# Patient Record
Sex: FEMALE | Race: WHITE | NOT HISPANIC OR LATINO | Employment: OTHER | URBAN - METROPOLITAN AREA
[De-identification: names, ages, dates, MRNs, and addresses within clinical notes are randomized per-mention and may not be internally consistent; named-entity substitution may affect disease eponyms.]

---

## 2018-03-13 ENCOUNTER — OFFICE VISIT CONVERTED (OUTPATIENT)
Dept: FAMILY MEDICINE CLINIC | Facility: CLINIC | Age: 63
End: 2018-03-13
Attending: FAMILY MEDICINE

## 2018-03-19 ENCOUNTER — OFFICE VISIT CONVERTED (OUTPATIENT)
Dept: SURGERY | Facility: CLINIC | Age: 63
End: 2018-03-19
Attending: SURGERY

## 2018-03-27 ENCOUNTER — OFFICE VISIT CONVERTED (OUTPATIENT)
Dept: FAMILY MEDICINE CLINIC | Facility: CLINIC | Age: 63
End: 2018-03-27
Attending: FAMILY MEDICINE

## 2018-09-13 ENCOUNTER — OFFICE VISIT CONVERTED (OUTPATIENT)
Dept: FAMILY MEDICINE CLINIC | Facility: CLINIC | Age: 63
End: 2018-09-13
Attending: FAMILY MEDICINE

## 2018-09-27 ENCOUNTER — OFFICE VISIT CONVERTED (OUTPATIENT)
Dept: FAMILY MEDICINE CLINIC | Facility: CLINIC | Age: 63
End: 2018-09-27
Attending: FAMILY MEDICINE

## 2019-01-31 ENCOUNTER — OFFICE VISIT CONVERTED (OUTPATIENT)
Dept: FAMILY MEDICINE CLINIC | Facility: CLINIC | Age: 64
End: 2019-01-31
Attending: FAMILY MEDICINE

## 2019-01-31 ENCOUNTER — HOSPITAL ENCOUNTER (OUTPATIENT)
Dept: FAMILY MEDICINE CLINIC | Facility: CLINIC | Age: 64
Discharge: HOME | End: 2019-01-31
Attending: FAMILY MEDICINE

## 2019-01-31 LAB
ALBUMIN SERPL-MCNC: 4.5 G/DL (ref 3.5–5)
ALBUMIN/GLOB SERPL: 1.5 {RATIO} (ref 1.4–2.6)
ALP SERPL-CCNC: 91 U/L (ref 43–160)
ALT SERPL-CCNC: 26 U/L (ref 10–40)
ANION GAP SERPL CALC-SCNC: 17 MMOL/L (ref 8–19)
AST SERPL-CCNC: 27 U/L (ref 15–50)
BASOPHILS # BLD AUTO: 0.09 10*3/UL (ref 0–0.2)
BASOPHILS NFR BLD AUTO: 1.02 % (ref 0–3)
BILIRUB SERPL-MCNC: 0.22 MG/DL (ref 0.2–1.3)
BUN SERPL-MCNC: 16 MG/DL (ref 5–25)
BUN/CREAT SERPL: 17 {RATIO} (ref 6–20)
CALCIUM SERPL-MCNC: 9.3 MG/DL (ref 8.7–10.4)
CHLORIDE SERPL-SCNC: 103 MMOL/L (ref 99–111)
CHOLEST SERPL-MCNC: 221 MG/DL (ref 107–200)
CHOLEST/HDLC SERPL: 4.9 {RATIO} (ref 3–6)
CONV CO2: 25 MMOL/L (ref 22–32)
CONV TOTAL PROTEIN: 7.6 G/DL (ref 6.3–8.2)
CREAT UR-MCNC: 0.94 MG/DL (ref 0.5–0.9)
EOSINOPHIL # BLD AUTO: 0.34 10*3/UL (ref 0–0.7)
EOSINOPHIL # BLD AUTO: 3.97 % (ref 0–7)
ERYTHROCYTE [DISTWIDTH] IN BLOOD BY AUTOMATED COUNT: 13.5 % (ref 11.5–14.5)
GFR SERPLBLD BASED ON 1.73 SQ M-ARVRAT: >60 ML/MIN/{1.73_M2}
GLOBULIN UR ELPH-MCNC: 3.1 G/DL (ref 2–3.5)
GLUCOSE SERPL-MCNC: 67 MG/DL (ref 65–99)
HBA1C MFR BLD: 15.5 G/DL (ref 12–16)
HCT VFR BLD AUTO: 45.5 % (ref 37–47)
HDLC SERPL-MCNC: 45 MG/DL (ref 40–60)
LDLC SERPL CALC-MCNC: 116 MG/DL (ref 70–100)
LYMPHOCYTES # BLD AUTO: 2.84 10*3/UL (ref 1–5)
MCH RBC QN AUTO: 30.8 PG (ref 27–31)
MCHC RBC AUTO-ENTMCNC: 34.1 G/DL (ref 33–37)
MCV RBC AUTO: 90.3 FL (ref 81–99)
MONOCYTES # BLD AUTO: 0.73 10*3/UL (ref 0.2–1.2)
MONOCYTES NFR BLD AUTO: 8.48 % (ref 3–10)
NEUTROPHILS # BLD AUTO: 4.62 10*3/UL (ref 2–8)
NEUTROPHILS NFR BLD AUTO: 53.6 % (ref 30–85)
NRBC BLD AUTO-RTO: 0 % (ref 0–0.01)
OSMOLALITY SERPL CALC.SUM OF ELEC: 289 MOSM/KG (ref 273–304)
PLATELET # BLD AUTO: 179 10*3/UL (ref 130–400)
PMV BLD AUTO: 11.2 FL (ref 7.4–10.4)
POTASSIUM SERPL-SCNC: 4.8 MMOL/L (ref 3.5–5.3)
RBC # BLD AUTO: 5.04 10*6/UL (ref 4.2–5.4)
SODIUM SERPL-SCNC: 140 MMOL/L (ref 135–147)
T4 FREE SERPL-MCNC: 1.4 NG/DL (ref 0.9–1.8)
TRIGL SERPL-MCNC: 300 MG/DL (ref 40–150)
TSH SERPL-ACNC: 1.68 M[IU]/L (ref 0.27–4.2)
VARIANT LYMPHS NFR BLD MANUAL: 32.9 % (ref 20–45)
VLDLC SERPL-MCNC: 60 MG/DL (ref 5–37)
WBC # BLD AUTO: 8.62 10*3/UL (ref 4.8–10.8)

## 2019-02-06 ENCOUNTER — HOSPITAL ENCOUNTER (OUTPATIENT)
Dept: OTHER | Facility: HOSPITAL | Age: 64
Discharge: HOME | End: 2019-02-06
Attending: FAMILY MEDICINE

## 2019-08-20 ENCOUNTER — HOSPITAL ENCOUNTER (OUTPATIENT)
Dept: OTHER | Facility: HOSPITAL | Age: 64
Discharge: HOME | End: 2019-08-20
Attending: FAMILY MEDICINE

## 2019-08-20 LAB
CREAT BLD-MCNC: 0.9 MG/DL (ref 0.6–1.4)
GFR SERPLBLD BASED ON 1.73 SQ M-ARVRAT: >60 ML/MIN/{1.73_M2}

## 2019-10-15 ENCOUNTER — OFFICE VISIT CONVERTED (OUTPATIENT)
Dept: FAMILY MEDICINE CLINIC | Facility: CLINIC | Age: 64
End: 2019-10-15
Attending: FAMILY MEDICINE

## 2019-10-15 ENCOUNTER — HOSPITAL ENCOUNTER (OUTPATIENT)
Dept: FAMILY MEDICINE CLINIC | Facility: CLINIC | Age: 64
Discharge: HOME | End: 2019-10-15
Attending: FAMILY MEDICINE

## 2019-10-15 LAB
ALBUMIN SERPL-MCNC: 4.6 G/DL (ref 3.5–5)
ALBUMIN/GLOB SERPL: 1.4 {RATIO} (ref 1.4–2.6)
ALP SERPL-CCNC: 88 U/L (ref 43–160)
ALT SERPL-CCNC: 29 U/L (ref 10–40)
ANION GAP SERPL CALC-SCNC: 17 MMOL/L (ref 8–19)
AST SERPL-CCNC: 31 U/L (ref 15–50)
BASOPHILS # BLD AUTO: 0.09 10*3/UL (ref 0–0.2)
BASOPHILS NFR BLD AUTO: 0.9 % (ref 0–3)
BILIRUB SERPL-MCNC: 0.3 MG/DL (ref 0.2–1.3)
BUN SERPL-MCNC: 15 MG/DL (ref 5–25)
BUN/CREAT SERPL: 15 {RATIO} (ref 6–20)
CALCIUM SERPL-MCNC: 9.6 MG/DL (ref 8.7–10.4)
CHLORIDE SERPL-SCNC: 103 MMOL/L (ref 99–111)
CHOLEST SERPL-MCNC: 233 MG/DL (ref 107–200)
CHOLEST/HDLC SERPL: 5.1 {RATIO} (ref 3–6)
CONV ABS IMM GRAN: 0.04 10*3/UL (ref 0–0.2)
CONV CO2: 24 MMOL/L (ref 22–32)
CONV IMMATURE GRAN: 0.4 % (ref 0–1.8)
CONV TOTAL PROTEIN: 7.9 G/DL (ref 6.3–8.2)
CREAT UR-MCNC: 0.98 MG/DL (ref 0.5–0.9)
DEPRECATED RDW RBC AUTO: 49.4 FL (ref 36.4–46.3)
EOSINOPHIL # BLD AUTO: 0.28 10*3/UL (ref 0–0.7)
EOSINOPHIL # BLD AUTO: 2.9 % (ref 0–7)
ERYTHROCYTE [DISTWIDTH] IN BLOOD BY AUTOMATED COUNT: 14.4 % (ref 11.7–14.4)
GFR SERPLBLD BASED ON 1.73 SQ M-ARVRAT: >60 ML/MIN/{1.73_M2}
GLOBULIN UR ELPH-MCNC: 3.3 G/DL (ref 2–3.5)
GLUCOSE SERPL-MCNC: 79 MG/DL (ref 65–99)
HCT VFR BLD AUTO: 50.8 % (ref 37–47)
HDLC SERPL-MCNC: 46 MG/DL (ref 40–60)
HGB BLD-MCNC: 16.2 G/DL (ref 12–16)
LDLC SERPL CALC-MCNC: 147 MG/DL (ref 70–100)
LYMPHOCYTES # BLD AUTO: 3.29 10*3/UL (ref 1–5)
LYMPHOCYTES NFR BLD AUTO: 34.6 % (ref 20–45)
MCH RBC QN AUTO: 29.8 PG (ref 27–31)
MCHC RBC AUTO-ENTMCNC: 31.9 G/DL (ref 33–37)
MCV RBC AUTO: 93.4 FL (ref 81–99)
MONOCYTES # BLD AUTO: 0.9 10*3/UL (ref 0.2–1.2)
MONOCYTES NFR BLD AUTO: 9.5 % (ref 3–10)
NEUTROPHILS # BLD AUTO: 4.9 10*3/UL (ref 2–8)
NEUTROPHILS NFR BLD AUTO: 51.7 % (ref 30–85)
NRBC CBCN: 0 % (ref 0–0.7)
OSMOLALITY SERPL CALC.SUM OF ELEC: 288 MOSM/KG (ref 273–304)
PLATELET # BLD AUTO: 182 10*3/UL (ref 130–400)
PMV BLD AUTO: 13.7 FL (ref 9.4–12.3)
POTASSIUM SERPL-SCNC: 5.1 MMOL/L (ref 3.5–5.3)
RBC # BLD AUTO: 5.44 10*6/UL (ref 4.2–5.4)
SODIUM SERPL-SCNC: 139 MMOL/L (ref 135–147)
T4 FREE SERPL-MCNC: 1.2 NG/DL (ref 0.9–1.8)
TRIGL SERPL-MCNC: 202 MG/DL (ref 40–150)
TSH SERPL-ACNC: 2.92 M[IU]/L (ref 0.27–4.2)
VLDLC SERPL-MCNC: 40 MG/DL (ref 5–37)
WBC # BLD AUTO: 9.5 10*3/UL (ref 4.8–10.8)

## 2019-11-01 ENCOUNTER — HOSPITAL ENCOUNTER (OUTPATIENT)
Dept: GASTROENTEROLOGY | Facility: HOSPITAL | Age: 64
Setting detail: HOSPITAL OUTPATIENT SURGERY
Discharge: HOME | End: 2019-11-01
Attending: INTERNAL MEDICINE

## 2019-11-07 ENCOUNTER — CONVERSION ENCOUNTER (OUTPATIENT)
Dept: FAMILY MEDICINE CLINIC | Facility: CLINIC | Age: 64
End: 2019-11-07

## 2019-11-07 ENCOUNTER — OFFICE VISIT CONVERTED (OUTPATIENT)
Dept: FAMILY MEDICINE CLINIC | Facility: CLINIC | Age: 64
End: 2019-11-07
Attending: FAMILY MEDICINE

## 2019-11-07 ENCOUNTER — HOSPITAL ENCOUNTER (OUTPATIENT)
Dept: FAMILY MEDICINE CLINIC | Facility: CLINIC | Age: 64
Discharge: HOME | End: 2019-11-07
Attending: FAMILY MEDICINE

## 2019-11-07 LAB
BASOPHILS # BLD AUTO: 0.1 10*3/UL (ref 0–0.2)
BASOPHILS NFR BLD AUTO: 1.1 % (ref 0–3)
CONV ABS IMM GRAN: 0.04 10*3/UL (ref 0–0.2)
CONV IMMATURE GRAN: 0.4 % (ref 0–1.8)
DEPRECATED RDW RBC AUTO: 48.3 FL (ref 36.4–46.3)
EOSINOPHIL # BLD AUTO: 0.23 10*3/UL (ref 0–0.7)
EOSINOPHIL # BLD AUTO: 2.5 % (ref 0–7)
ERYTHROCYTE [DISTWIDTH] IN BLOOD BY AUTOMATED COUNT: 14.4 % (ref 11.7–14.4)
HCT VFR BLD AUTO: 48.1 % (ref 37–47)
HGB BLD-MCNC: 16.1 G/DL (ref 12–16)
LYMPHOCYTES # BLD AUTO: 2.88 10*3/UL (ref 1–5)
LYMPHOCYTES NFR BLD AUTO: 31.3 % (ref 20–45)
MCH RBC QN AUTO: 30.5 PG (ref 27–31)
MCHC RBC AUTO-ENTMCNC: 33.5 G/DL (ref 33–37)
MCV RBC AUTO: 91.1 FL (ref 81–99)
MONOCYTES # BLD AUTO: 0.85 10*3/UL (ref 0.2–1.2)
MONOCYTES NFR BLD AUTO: 9.2 % (ref 3–10)
NEUTROPHILS # BLD AUTO: 5.11 10*3/UL (ref 2–8)
NEUTROPHILS NFR BLD AUTO: 55.5 % (ref 30–85)
NRBC CBCN: 0 % (ref 0–0.7)
PLATELET # BLD AUTO: 239 10*3/UL (ref 130–400)
PMV BLD AUTO: 12.9 FL (ref 9.4–12.3)
RBC # BLD AUTO: 5.28 10*6/UL (ref 4.2–5.4)
WBC # BLD AUTO: 9.21 10*3/UL (ref 4.8–10.8)

## 2020-04-20 ENCOUNTER — TELEPHONE CONVERTED (OUTPATIENT)
Dept: FAMILY MEDICINE CLINIC | Facility: CLINIC | Age: 65
End: 2020-04-20
Attending: FAMILY MEDICINE

## 2020-04-21 ENCOUNTER — HOSPITAL ENCOUNTER (OUTPATIENT)
Dept: FAMILY MEDICINE CLINIC | Facility: CLINIC | Age: 65
Discharge: HOME | End: 2020-04-21
Attending: FAMILY MEDICINE

## 2020-04-21 LAB
ALBUMIN SERPL-MCNC: 4.7 G/DL (ref 3.5–5)
ALBUMIN/GLOB SERPL: 1.6 {RATIO} (ref 1.4–2.6)
ALP SERPL-CCNC: 80 U/L (ref 43–160)
ALT SERPL-CCNC: 26 U/L (ref 10–40)
ANION GAP SERPL CALC-SCNC: 25 MMOL/L (ref 8–19)
AST SERPL-CCNC: 29 U/L (ref 15–50)
BASOPHILS # BLD AUTO: 0.1 10*3/UL (ref 0–0.2)
BASOPHILS NFR BLD AUTO: 1.1 % (ref 0–3)
BILIRUB SERPL-MCNC: 0.25 MG/DL (ref 0.2–1.3)
BUN SERPL-MCNC: 13 MG/DL (ref 5–25)
BUN/CREAT SERPL: 13 {RATIO} (ref 6–20)
CALCIUM SERPL-MCNC: 9.6 MG/DL (ref 8.7–10.4)
CHLORIDE SERPL-SCNC: 103 MMOL/L (ref 99–111)
CHOLEST SERPL-MCNC: 231 MG/DL (ref 107–200)
CHOLEST/HDLC SERPL: 4 {RATIO} (ref 3–6)
CONV ABS IMM GRAN: 0.03 10*3/UL (ref 0–0.2)
CONV CO2: 20 MMOL/L (ref 22–32)
CONV IMMATURE GRAN: 0.3 % (ref 0–1.8)
CONV TOTAL PROTEIN: 7.7 G/DL (ref 6.3–8.2)
CREAT UR-MCNC: 0.99 MG/DL (ref 0.5–0.9)
DEPRECATED RDW RBC AUTO: 49.1 FL (ref 36.4–46.3)
EOSINOPHIL # BLD AUTO: 0.26 10*3/UL (ref 0–0.7)
EOSINOPHIL # BLD AUTO: 2.9 % (ref 0–7)
ERYTHROCYTE [DISTWIDTH] IN BLOOD BY AUTOMATED COUNT: 14.6 % (ref 11.7–14.4)
GFR SERPLBLD BASED ON 1.73 SQ M-ARVRAT: >60 ML/MIN/{1.73_M2}
GLOBULIN UR ELPH-MCNC: 3 G/DL (ref 2–3.5)
GLUCOSE SERPL-MCNC: 86 MG/DL (ref 65–99)
HCT VFR BLD AUTO: 50.5 % (ref 37–47)
HDLC SERPL-MCNC: 58 MG/DL (ref 40–60)
HGB BLD-MCNC: 16.2 G/DL (ref 12–16)
LDLC SERPL CALC-MCNC: 139 MG/DL (ref 70–100)
LYMPHOCYTES # BLD AUTO: 3.29 10*3/UL (ref 1–5)
LYMPHOCYTES NFR BLD AUTO: 36.8 % (ref 20–45)
MCH RBC QN AUTO: 29.2 PG (ref 27–31)
MCHC RBC AUTO-ENTMCNC: 32.1 G/DL (ref 33–37)
MCV RBC AUTO: 91 FL (ref 81–99)
MONOCYTES # BLD AUTO: 0.74 10*3/UL (ref 0.2–1.2)
MONOCYTES NFR BLD AUTO: 8.3 % (ref 3–10)
NEUTROPHILS # BLD AUTO: 4.52 10*3/UL (ref 2–8)
NEUTROPHILS NFR BLD AUTO: 50.6 % (ref 30–85)
NRBC CBCN: 0 % (ref 0–0.7)
OSMOLALITY SERPL CALC.SUM OF ELEC: 297 MOSM/KG (ref 273–304)
PLATELET # BLD AUTO: 209 10*3/UL (ref 130–400)
PMV BLD AUTO: 13.2 FL (ref 9.4–12.3)
POTASSIUM SERPL-SCNC: 4.4 MMOL/L (ref 3.5–5.3)
RBC # BLD AUTO: 5.55 10*6/UL (ref 4.2–5.4)
SODIUM SERPL-SCNC: 144 MMOL/L (ref 135–147)
T4 FREE SERPL-MCNC: 1.5 NG/DL (ref 0.9–1.8)
TRIGL SERPL-MCNC: 170 MG/DL (ref 40–150)
TSH SERPL-ACNC: 2.29 M[IU]/L (ref 0.27–4.2)
VLDLC SERPL-MCNC: 34 MG/DL (ref 5–37)
WBC # BLD AUTO: 8.94 10*3/UL (ref 4.8–10.8)

## 2020-07-08 ENCOUNTER — OFFICE VISIT CONVERTED (OUTPATIENT)
Dept: FAMILY MEDICINE CLINIC | Facility: CLINIC | Age: 65
End: 2020-07-08
Attending: FAMILY MEDICINE

## 2020-07-08 ENCOUNTER — HOSPITAL ENCOUNTER (OUTPATIENT)
Dept: FAMILY MEDICINE CLINIC | Facility: CLINIC | Age: 65
Discharge: HOME | End: 2020-07-08
Attending: FAMILY MEDICINE

## 2020-07-08 ENCOUNTER — CONVERSION ENCOUNTER (OUTPATIENT)
Dept: FAMILY MEDICINE CLINIC | Facility: CLINIC | Age: 65
End: 2020-07-08

## 2020-07-08 LAB
ALBUMIN SERPL-MCNC: 4.6 G/DL (ref 3.5–5)
ALBUMIN/GLOB SERPL: 1.6 {RATIO} (ref 1.4–2.6)
ALP SERPL-CCNC: 82 U/L (ref 43–160)
ALT SERPL-CCNC: 21 U/L (ref 10–40)
ANION GAP SERPL CALC-SCNC: 15 MMOL/L (ref 8–19)
AST SERPL-CCNC: 24 U/L (ref 15–50)
BASOPHILS # BLD AUTO: 0.08 10*3/UL (ref 0–0.2)
BASOPHILS NFR BLD AUTO: 0.9 % (ref 0–3)
BILIRUB SERPL-MCNC: 0.21 MG/DL (ref 0.2–1.3)
BUN SERPL-MCNC: 14 MG/DL (ref 5–25)
BUN/CREAT SERPL: 12 {RATIO} (ref 6–20)
CALCIUM SERPL-MCNC: 9.9 MG/DL (ref 8.7–10.4)
CHLORIDE SERPL-SCNC: 106 MMOL/L (ref 99–111)
CK SERPL-CCNC: 420 U/L (ref 35–230)
CONV ABS IMM GRAN: 0.03 10*3/UL (ref 0–0.2)
CONV CO2: 28 MMOL/L (ref 22–32)
CONV IMMATURE GRAN: 0.3 % (ref 0–1.8)
CONV TOTAL PROTEIN: 7.4 G/DL (ref 6.3–8.2)
CREAT UR-MCNC: 1.21 MG/DL (ref 0.5–0.9)
DEPRECATED RDW RBC AUTO: 48.1 FL (ref 36.4–46.3)
EOSINOPHIL # BLD AUTO: 0.2 10*3/UL (ref 0–0.7)
EOSINOPHIL # BLD AUTO: 2.2 % (ref 0–7)
ERYTHROCYTE [DISTWIDTH] IN BLOOD BY AUTOMATED COUNT: 14.2 % (ref 11.7–14.4)
GFR SERPLBLD BASED ON 1.73 SQ M-ARVRAT: 47 ML/MIN/{1.73_M2}
GLOBULIN UR ELPH-MCNC: 2.8 G/DL (ref 2–3.5)
GLUCOSE SERPL-MCNC: 87 MG/DL (ref 65–99)
HCT VFR BLD AUTO: 47.3 % (ref 37–47)
HGB BLD-MCNC: 15.2 G/DL (ref 12–16)
LYMPHOCYTES # BLD AUTO: 2.76 10*3/UL (ref 1–5)
LYMPHOCYTES NFR BLD AUTO: 30.9 % (ref 20–45)
MAGNESIUM SERPL-MCNC: 2.15 MG/DL (ref 1.6–2.3)
MCH RBC QN AUTO: 29.4 PG (ref 27–31)
MCHC RBC AUTO-ENTMCNC: 32.1 G/DL (ref 33–37)
MCV RBC AUTO: 91.5 FL (ref 81–99)
MONOCYTES # BLD AUTO: 0.78 10*3/UL (ref 0.2–1.2)
MONOCYTES NFR BLD AUTO: 8.7 % (ref 3–10)
NEUTROPHILS # BLD AUTO: 5.08 10*3/UL (ref 2–8)
NEUTROPHILS NFR BLD AUTO: 57 % (ref 30–85)
NRBC CBCN: 0 % (ref 0–0.7)
OSMOLALITY SERPL CALC.SUM OF ELEC: 298 MOSM/KG (ref 273–304)
PLATELET # BLD AUTO: 183 10*3/UL (ref 130–400)
PMV BLD AUTO: 13.1 FL (ref 9.4–12.3)
POTASSIUM SERPL-SCNC: 4.5 MMOL/L (ref 3.5–5.3)
RBC # BLD AUTO: 5.17 10*6/UL (ref 4.2–5.4)
SODIUM SERPL-SCNC: 144 MMOL/L (ref 135–147)
WBC # BLD AUTO: 8.93 10*3/UL (ref 4.8–10.8)

## 2020-07-21 ENCOUNTER — HOSPITAL ENCOUNTER (OUTPATIENT)
Dept: FAMILY MEDICINE CLINIC | Facility: CLINIC | Age: 65
Discharge: HOME | End: 2020-07-21
Attending: FAMILY MEDICINE

## 2020-07-21 ENCOUNTER — OFFICE VISIT CONVERTED (OUTPATIENT)
Dept: FAMILY MEDICINE CLINIC | Facility: CLINIC | Age: 65
End: 2020-07-21
Attending: FAMILY MEDICINE

## 2020-07-21 LAB
ALBUMIN SERPL-MCNC: 4.4 G/DL (ref 3.5–5)
ALBUMIN/GLOB SERPL: 1.3 {RATIO} (ref 1.4–2.6)
ALP SERPL-CCNC: 73 U/L (ref 43–160)
ALT SERPL-CCNC: 18 U/L (ref 10–40)
ANION GAP SERPL CALC-SCNC: 21 MMOL/L (ref 8–19)
AST SERPL-CCNC: 18 U/L (ref 15–50)
BILIRUB SERPL-MCNC: 0.57 MG/DL (ref 0.2–1.3)
BUN SERPL-MCNC: 12 MG/DL (ref 5–25)
BUN/CREAT SERPL: 12 {RATIO} (ref 6–20)
CALCIUM SERPL-MCNC: 9.3 MG/DL (ref 8.7–10.4)
CHLORIDE SERPL-SCNC: 98 MMOL/L (ref 99–111)
CK SERPL-CCNC: 136 U/L (ref 35–230)
CONV CO2: 24 MMOL/L (ref 22–32)
CONV TOTAL PROTEIN: 7.7 G/DL (ref 6.3–8.2)
CREAT UR-MCNC: 0.98 MG/DL (ref 0.5–0.9)
GFR SERPLBLD BASED ON 1.73 SQ M-ARVRAT: >60 ML/MIN/{1.73_M2}
GLOBULIN UR ELPH-MCNC: 3.3 G/DL (ref 2–3.5)
GLUCOSE SERPL-MCNC: 91 MG/DL (ref 65–99)
OSMOLALITY SERPL CALC.SUM OF ELEC: 287 MOSM/KG (ref 273–304)
POTASSIUM SERPL-SCNC: 4.2 MMOL/L (ref 3.5–5.3)
SODIUM SERPL-SCNC: 139 MMOL/L (ref 135–147)

## 2020-12-15 ENCOUNTER — OFFICE VISIT CONVERTED (OUTPATIENT)
Dept: FAMILY MEDICINE CLINIC | Facility: CLINIC | Age: 65
End: 2020-12-15
Attending: FAMILY MEDICINE

## 2020-12-15 ENCOUNTER — HOSPITAL ENCOUNTER (OUTPATIENT)
Dept: FAMILY MEDICINE CLINIC | Facility: CLINIC | Age: 65
Discharge: HOME | End: 2020-12-15
Attending: FAMILY MEDICINE

## 2021-02-05 ENCOUNTER — HOSPITAL ENCOUNTER (OUTPATIENT)
Dept: FAMILY MEDICINE CLINIC | Facility: CLINIC | Age: 66
Discharge: HOME | End: 2021-02-05

## 2021-02-05 ENCOUNTER — HOSPITAL ENCOUNTER (OUTPATIENT)
Dept: OTHER | Facility: HOSPITAL | Age: 66
Discharge: HOME | End: 2021-02-05

## 2021-02-05 LAB
BASOPHILS # BLD AUTO: 0.08 10*3/UL (ref 0–0.2)
BASOPHILS NFR BLD AUTO: 0.9 % (ref 0–3)
CONV ABS IMM GRAN: 0.04 10*3/UL (ref 0–0.2)
CONV IMMATURE GRAN: 0.4 % (ref 0–1.8)
DEPRECATED RDW RBC AUTO: 46.7 FL (ref 36.4–46.3)
EOSINOPHIL # BLD AUTO: 0.23 10*3/UL (ref 0–0.7)
EOSINOPHIL # BLD AUTO: 2.5 % (ref 0–7)
ERYTHROCYTE [DISTWIDTH] IN BLOOD BY AUTOMATED COUNT: 14 % (ref 11.7–14.4)
HCT VFR BLD AUTO: 51.8 % (ref 37–47)
HGB BLD-MCNC: 16.7 G/DL (ref 12–16)
LYMPHOCYTES # BLD AUTO: 2.83 10*3/UL (ref 1–5)
LYMPHOCYTES NFR BLD AUTO: 30.3 % (ref 20–45)
MCH RBC QN AUTO: 29.2 PG (ref 27–31)
MCHC RBC AUTO-ENTMCNC: 32.2 G/DL (ref 33–37)
MCV RBC AUTO: 90.6 FL (ref 81–99)
MONOCYTES # BLD AUTO: 0.67 10*3/UL (ref 0.2–1.2)
MONOCYTES NFR BLD AUTO: 7.2 % (ref 3–10)
NEUTROPHILS # BLD AUTO: 5.5 10*3/UL (ref 2–8)
NEUTROPHILS NFR BLD AUTO: 58.7 % (ref 30–85)
NRBC CBCN: 0 % (ref 0–0.7)
PLATELET # BLD AUTO: 199 10*3/UL (ref 130–400)
PMV BLD AUTO: 13.8 FL (ref 9.4–12.3)
RBC # BLD AUTO: 5.72 10*6/UL (ref 4.2–5.4)
WBC # BLD AUTO: 9.35 10*3/UL (ref 4.8–10.8)

## 2021-02-06 LAB — SARS-COV-2 RNA SPEC QL NAA+PROBE: NOT DETECTED

## 2021-03-12 ENCOUNTER — HOSPITAL ENCOUNTER (OUTPATIENT)
Dept: FAMILY MEDICINE CLINIC | Facility: CLINIC | Age: 66
Discharge: HOME | End: 2021-03-12
Attending: FAMILY MEDICINE

## 2021-03-13 LAB — SARS-COV-2 RNA SPEC QL NAA+PROBE: NOT DETECTED

## 2021-03-25 ENCOUNTER — HOSPITAL ENCOUNTER (OUTPATIENT)
Dept: FAMILY MEDICINE CLINIC | Facility: CLINIC | Age: 66
Discharge: HOME | End: 2021-03-25
Attending: FAMILY MEDICINE

## 2021-03-25 ENCOUNTER — OFFICE VISIT CONVERTED (OUTPATIENT)
Dept: FAMILY MEDICINE CLINIC | Facility: CLINIC | Age: 66
End: 2021-03-25
Attending: FAMILY MEDICINE

## 2021-03-25 LAB
ALBUMIN SERPL-MCNC: 4.7 G/DL (ref 3.5–5)
ALBUMIN/GLOB SERPL: 1.6 {RATIO} (ref 1.4–2.6)
ALP SERPL-CCNC: 86 U/L (ref 43–160)
ALT SERPL-CCNC: 16 U/L (ref 10–40)
ANION GAP SERPL CALC-SCNC: 16 MMOL/L (ref 8–19)
AST SERPL-CCNC: 19 U/L (ref 15–50)
BASOPHILS # BLD AUTO: 0.1 10*3/UL (ref 0–0.2)
BASOPHILS NFR BLD AUTO: 0.9 % (ref 0–3)
BILIRUB SERPL-MCNC: 0.33 MG/DL (ref 0.2–1.3)
BUN SERPL-MCNC: 15 MG/DL (ref 5–25)
BUN/CREAT SERPL: 15 {RATIO} (ref 6–20)
CALCIUM SERPL-MCNC: 9.7 MG/DL (ref 8.7–10.4)
CHLORIDE SERPL-SCNC: 102 MMOL/L (ref 99–111)
CHOLEST SERPL-MCNC: 226 MG/DL (ref 107–200)
CHOLEST/HDLC SERPL: 3.3 {RATIO} (ref 3–6)
CONV ABS IMM GRAN: 0.07 10*3/UL (ref 0–0.2)
CONV CO2: 29 MMOL/L (ref 22–32)
CONV IMMATURE GRAN: 0.6 % (ref 0–1.8)
CONV TOTAL PROTEIN: 7.7 G/DL (ref 6.3–8.2)
CREAT UR-MCNC: 1 MG/DL (ref 0.5–0.9)
DEPRECATED RDW RBC AUTO: 47.4 FL (ref 36.4–46.3)
EOSINOPHIL # BLD AUTO: 0.18 10*3/UL (ref 0–0.7)
EOSINOPHIL # BLD AUTO: 1.7 % (ref 0–7)
ERYTHROCYTE [DISTWIDTH] IN BLOOD BY AUTOMATED COUNT: 14.5 % (ref 11.7–14.4)
GFR SERPLBLD BASED ON 1.73 SQ M-ARVRAT: 59 ML/MIN/{1.73_M2}
GLOBULIN UR ELPH-MCNC: 3 G/DL (ref 2–3.5)
GLUCOSE SERPL-MCNC: 89 MG/DL (ref 65–99)
HCT VFR BLD AUTO: 49 % (ref 37–47)
HDLC SERPL-MCNC: 68 MG/DL (ref 40–60)
HGB BLD-MCNC: 15.8 G/DL (ref 12–16)
LDLC SERPL CALC-MCNC: 130 MG/DL (ref 70–100)
LYMPHOCYTES # BLD AUTO: 2.88 10*3/UL (ref 1–5)
LYMPHOCYTES NFR BLD AUTO: 26.5 % (ref 20–45)
MCH RBC QN AUTO: 29 PG (ref 27–31)
MCHC RBC AUTO-ENTMCNC: 32.2 G/DL (ref 33–37)
MCV RBC AUTO: 90.1 FL (ref 81–99)
MONOCYTES # BLD AUTO: 0.81 10*3/UL (ref 0.2–1.2)
MONOCYTES NFR BLD AUTO: 7.4 % (ref 3–10)
NEUTROPHILS # BLD AUTO: 6.84 10*3/UL (ref 2–8)
NEUTROPHILS NFR BLD AUTO: 62.9 % (ref 30–85)
NRBC CBCN: 0 % (ref 0–0.7)
OSMOLALITY SERPL CALC.SUM OF ELEC: 294 MOSM/KG (ref 273–304)
PLATELET # BLD AUTO: 189 10*3/UL (ref 130–400)
PMV BLD AUTO: 14.3 FL (ref 9.4–12.3)
POTASSIUM SERPL-SCNC: 4.8 MMOL/L (ref 3.5–5.3)
RBC # BLD AUTO: 5.44 10*6/UL (ref 4.2–5.4)
SODIUM SERPL-SCNC: 142 MMOL/L (ref 135–147)
T4 FREE SERPL-MCNC: 1.7 NG/DL (ref 0.9–1.8)
TRIGL SERPL-MCNC: 140 MG/DL (ref 40–150)
TSH SERPL-ACNC: 0.52 M[IU]/L (ref 0.27–4.2)
VLDLC SERPL-MCNC: 28 MG/DL (ref 5–37)
WBC # BLD AUTO: 10.88 10*3/UL (ref 4.8–10.8)

## 2021-04-26 ENCOUNTER — OFFICE VISIT CONVERTED (OUTPATIENT)
Dept: FAMILY MEDICINE CLINIC | Facility: CLINIC | Age: 66
End: 2021-04-26
Attending: FAMILY MEDICINE

## 2021-04-26 ENCOUNTER — HOSPITAL ENCOUNTER (OUTPATIENT)
Dept: FAMILY MEDICINE CLINIC | Facility: CLINIC | Age: 66
Discharge: HOME | End: 2021-04-26
Attending: FAMILY MEDICINE

## 2021-04-26 LAB
ANION GAP SERPL CALC-SCNC: 15 MMOL/L (ref 8–19)
BASOPHILS # BLD AUTO: 0.1 10*3/UL (ref 0–0.2)
BASOPHILS NFR BLD AUTO: 1.2 % (ref 0–3)
BUN SERPL-MCNC: 10 MG/DL (ref 5–25)
BUN/CREAT SERPL: 10 {RATIO} (ref 6–20)
CALCIUM SERPL-MCNC: 9 MG/DL (ref 8.7–10.4)
CHLORIDE SERPL-SCNC: 102 MMOL/L (ref 99–111)
CONV ABS IMM GRAN: 0.02 10*3/UL (ref 0–0.2)
CONV CO2: 28 MMOL/L (ref 22–32)
CONV IMMATURE GRAN: 0.2 % (ref 0–1.8)
CREAT UR-MCNC: 1 MG/DL (ref 0.5–0.9)
DEPRECATED RDW RBC AUTO: 47.7 FL (ref 36.4–46.3)
EOSINOPHIL # BLD AUTO: 0.16 10*3/UL (ref 0–0.7)
EOSINOPHIL # BLD AUTO: 1.9 % (ref 0–7)
ERYTHROCYTE [DISTWIDTH] IN BLOOD BY AUTOMATED COUNT: 14.4 % (ref 11.7–14.4)
GFR SERPLBLD BASED ON 1.73 SQ M-ARVRAT: 59 ML/MIN/{1.73_M2}
GLUCOSE SERPL-MCNC: 85 MG/DL (ref 65–99)
HCT VFR BLD AUTO: 46.9 % (ref 37–47)
HGB BLD-MCNC: 15.3 G/DL (ref 12–16)
LYMPHOCYTES # BLD AUTO: 2.42 10*3/UL (ref 1–5)
LYMPHOCYTES NFR BLD AUTO: 28.8 % (ref 20–45)
MCH RBC QN AUTO: 29.5 PG (ref 27–31)
MCHC RBC AUTO-ENTMCNC: 32.6 G/DL (ref 33–37)
MCV RBC AUTO: 90.4 FL (ref 81–99)
MONOCYTES # BLD AUTO: 0.91 10*3/UL (ref 0.2–1.2)
MONOCYTES NFR BLD AUTO: 10.8 % (ref 3–10)
NEUTROPHILS # BLD AUTO: 4.78 10*3/UL (ref 2–8)
NEUTROPHILS NFR BLD AUTO: 57.1 % (ref 30–85)
NRBC CBCN: 0 % (ref 0–0.7)
OSMOLALITY SERPL CALC.SUM OF ELEC: 288 MOSM/KG (ref 273–304)
PLATELET # BLD AUTO: 150 10*3/UL (ref 130–400)
PMV BLD AUTO: 14.5 FL (ref 9.4–12.3)
POTASSIUM SERPL-SCNC: 4.7 MMOL/L (ref 3.5–5.3)
RBC # BLD AUTO: 5.19 10*6/UL (ref 4.2–5.4)
SODIUM SERPL-SCNC: 140 MMOL/L (ref 135–147)
WBC # BLD AUTO: 8.39 10*3/UL (ref 4.8–10.8)

## 2021-05-09 VITALS
HEART RATE: 71 BPM | OXYGEN SATURATION: 97 % | SYSTOLIC BLOOD PRESSURE: 122 MMHG | DIASTOLIC BLOOD PRESSURE: 80 MMHG | WEIGHT: 147 LBS | TEMPERATURE: 97.1 F | SYSTOLIC BLOOD PRESSURE: 162 MMHG | DIASTOLIC BLOOD PRESSURE: 60 MMHG

## 2021-05-09 VITALS
BODY MASS INDEX: 22.07 KG/M2 | TEMPERATURE: 97.2 F | WEIGHT: 129.25 LBS | HEART RATE: 113 BPM | HEIGHT: 64 IN | SYSTOLIC BLOOD PRESSURE: 108 MMHG | OXYGEN SATURATION: 96 % | DIASTOLIC BLOOD PRESSURE: 68 MMHG

## 2021-05-09 VITALS
OXYGEN SATURATION: 97 % | SYSTOLIC BLOOD PRESSURE: 128 MMHG | HEART RATE: 96 BPM | WEIGHT: 133 LBS | DIASTOLIC BLOOD PRESSURE: 78 MMHG | BODY MASS INDEX: 23.57 KG/M2 | HEIGHT: 63 IN | TEMPERATURE: 98 F

## 2021-05-09 VITALS
HEART RATE: 54 BPM | SYSTOLIC BLOOD PRESSURE: 110 MMHG | OXYGEN SATURATION: 100 % | DIASTOLIC BLOOD PRESSURE: 82 MMHG | BODY MASS INDEX: 24.6 KG/M2 | TEMPERATURE: 97.2 F | HEIGHT: 64 IN | WEIGHT: 144.12 LBS

## 2021-05-09 VITALS
OXYGEN SATURATION: 98 % | SYSTOLIC BLOOD PRESSURE: 138 MMHG | HEIGHT: 64 IN | WEIGHT: 134.37 LBS | DIASTOLIC BLOOD PRESSURE: 78 MMHG | HEART RATE: 68 BPM | BODY MASS INDEX: 22.94 KG/M2 | TEMPERATURE: 97.4 F

## 2021-05-09 VITALS
TEMPERATURE: 97.6 F | OXYGEN SATURATION: 97 % | HEART RATE: 68 BPM | SYSTOLIC BLOOD PRESSURE: 120 MMHG | DIASTOLIC BLOOD PRESSURE: 78 MMHG | WEIGHT: 137 LBS

## 2021-05-09 VITALS
TEMPERATURE: 98 F | HEART RATE: 71 BPM | OXYGEN SATURATION: 96 % | SYSTOLIC BLOOD PRESSURE: 110 MMHG | BODY MASS INDEX: 25.16 KG/M2 | HEIGHT: 63 IN | WEIGHT: 142 LBS | DIASTOLIC BLOOD PRESSURE: 70 MMHG

## 2021-05-09 VITALS
SYSTOLIC BLOOD PRESSURE: 106 MMHG | OXYGEN SATURATION: 96 % | BODY MASS INDEX: 22.28 KG/M2 | TEMPERATURE: 97.4 F | HEIGHT: 64 IN | HEART RATE: 78 BPM | DIASTOLIC BLOOD PRESSURE: 60 MMHG | WEIGHT: 130.5 LBS

## 2021-05-09 VITALS
WEIGHT: 140 LBS | TEMPERATURE: 97 F | DIASTOLIC BLOOD PRESSURE: 78 MMHG | OXYGEN SATURATION: 99 % | HEIGHT: 64 IN | SYSTOLIC BLOOD PRESSURE: 120 MMHG | BODY MASS INDEX: 23.9 KG/M2 | HEART RATE: 81 BPM

## 2021-05-09 VITALS
DIASTOLIC BLOOD PRESSURE: 72 MMHG | WEIGHT: 135.19 LBS | TEMPERATURE: 97.5 F | SYSTOLIC BLOOD PRESSURE: 124 MMHG | BODY MASS INDEX: 23.95 KG/M2 | OXYGEN SATURATION: 99 % | HEIGHT: 63 IN | HEART RATE: 70 BPM

## 2021-05-09 VITALS
HEART RATE: 95 BPM | SYSTOLIC BLOOD PRESSURE: 112 MMHG | OXYGEN SATURATION: 96 % | DIASTOLIC BLOOD PRESSURE: 70 MMHG | WEIGHT: 138 LBS | TEMPERATURE: 98 F

## 2021-05-09 VITALS
HEIGHT: 63 IN | TEMPERATURE: 98 F | DIASTOLIC BLOOD PRESSURE: 78 MMHG | SYSTOLIC BLOOD PRESSURE: 150 MMHG | BODY MASS INDEX: 24.1 KG/M2 | WEIGHT: 136 LBS | OXYGEN SATURATION: 95 % | HEART RATE: 84 BPM

## 2021-05-16 VITALS — WEIGHT: 132 LBS | RESPIRATION RATE: 16 BRPM | BODY MASS INDEX: 22.53 KG/M2 | HEIGHT: 64 IN

## 2021-07-06 RX ORDER — ALBUTEROL SULFATE 2.5 MG/3ML
2.5 SOLUTION RESPIRATORY (INHALATION) EVERY 4 HOURS PRN
Qty: 8.5 ML | Refills: 12 | Status: SHIPPED | OUTPATIENT
Start: 2021-07-06 | End: 2021-07-09

## 2021-07-06 RX ORDER — ALBUTEROL SULFATE 90 UG/1
AEROSOL, METERED RESPIRATORY (INHALATION)
Qty: 18 G | Refills: 3 | Status: CANCELLED | OUTPATIENT
Start: 2021-07-06

## 2021-07-09 RX ORDER — ALBUTEROL SULFATE 90 UG/1
AEROSOL, METERED RESPIRATORY (INHALATION)
Qty: 18 G | Refills: 3 | Status: CANCELLED | OUTPATIENT
Start: 2021-07-09

## 2021-07-09 RX ORDER — ALBUTEROL SULFATE 90 UG/1
2 AEROSOL, METERED RESPIRATORY (INHALATION) EVERY 4 HOURS PRN
COMMUNITY
End: 2021-07-09

## 2021-08-02 RX ORDER — LEVOTHYROXINE SODIUM 0.07 MG/1
TABLET ORAL
Qty: 90 TABLET | Refills: 0 | Status: SHIPPED | OUTPATIENT
Start: 2021-08-02 | End: 2021-10-28 | Stop reason: SDUPTHER

## 2021-08-02 RX ORDER — LEVOTHYROXINE SODIUM 0.07 MG/1
75 TABLET ORAL DAILY
COMMUNITY
Start: 2021-05-10 | End: 2021-10-28 | Stop reason: SDUPTHER

## 2021-10-28 ENCOUNTER — OFFICE VISIT (OUTPATIENT)
Dept: FAMILY MEDICINE CLINIC | Facility: CLINIC | Age: 66
End: 2021-10-28

## 2021-10-28 VITALS
BODY MASS INDEX: 23.21 KG/M2 | WEIGHT: 131 LBS | TEMPERATURE: 97.7 F | DIASTOLIC BLOOD PRESSURE: 88 MMHG | HEIGHT: 63 IN | HEART RATE: 76 BPM | SYSTOLIC BLOOD PRESSURE: 122 MMHG

## 2021-10-28 DIAGNOSIS — Z23 NEED FOR INFLUENZA VACCINATION: ICD-10-CM

## 2021-10-28 DIAGNOSIS — Z85.41 HISTORY OF CERVICAL CANCER: Primary | ICD-10-CM

## 2021-10-28 DIAGNOSIS — I83.813 VARICOSE VEINS OF BILATERAL LOWER EXTREMITIES WITH PAIN: ICD-10-CM

## 2021-10-28 DIAGNOSIS — J41.0 SIMPLE CHRONIC BRONCHITIS (HCC): ICD-10-CM

## 2021-10-28 DIAGNOSIS — E78.2 MIXED HYPERLIPIDEMIA: ICD-10-CM

## 2021-10-28 DIAGNOSIS — E03.9 HYPOTHYROIDISM, UNSPECIFIED TYPE: ICD-10-CM

## 2021-10-28 DIAGNOSIS — J30.89 NON-SEASONAL ALLERGIC RHINITIS, UNSPECIFIED TRIGGER: ICD-10-CM

## 2021-10-28 DIAGNOSIS — Z23 NEED FOR PNEUMOCOCCAL VACCINATION: ICD-10-CM

## 2021-10-28 DIAGNOSIS — Z00.00 NORMAL BREAST EXAM: ICD-10-CM

## 2021-10-28 DIAGNOSIS — I87.2 VENOUS INSUFFICIENCY (CHRONIC) (PERIPHERAL): ICD-10-CM

## 2021-10-28 PROBLEM — J30.2 SEASONAL ALLERGIC RHINITIS: Status: ACTIVE | Noted: 2021-10-28

## 2021-10-28 PROBLEM — M19.90 ARTHRITIS: Status: ACTIVE | Noted: 2021-10-28

## 2021-10-28 PROBLEM — Z80.0 FAMILY HISTORY OF MALIGNANT NEOPLASM OF DIGESTIVE ORGANS: Status: ACTIVE | Noted: 2017-02-14

## 2021-10-28 PROBLEM — F41.9 ANXIETY: Status: ACTIVE | Noted: 2021-10-28

## 2021-10-28 PROBLEM — I00 RHEUMATIC FEVER: Status: ACTIVE | Noted: 2021-10-28

## 2021-10-28 PROBLEM — D64.9 ANEMIA: Status: ACTIVE | Noted: 2021-10-28

## 2021-10-28 PROBLEM — K21.9 GERD (GASTROESOPHAGEAL REFLUX DISEASE): Status: ACTIVE | Noted: 2021-10-28

## 2021-10-28 PROBLEM — J45.909 ASTHMA: Status: ACTIVE | Noted: 2021-10-28

## 2021-10-28 PROBLEM — C80.1 CANCER: Status: ACTIVE | Noted: 2021-10-28

## 2021-10-28 PROBLEM — L98.9 SKIN LESION: Status: ACTIVE | Noted: 2021-10-28

## 2021-10-28 PROBLEM — G43.909 MIGRAINES: Status: ACTIVE | Noted: 2021-10-28

## 2021-10-28 LAB
ALBUMIN SERPL-MCNC: 5 G/DL (ref 3.5–5.2)
ALBUMIN/GLOB SERPL: 1.7 G/DL
ALP SERPL-CCNC: 96 U/L (ref 39–117)
ALT SERPL W P-5'-P-CCNC: 20 U/L (ref 1–33)
ANION GAP SERPL CALCULATED.3IONS-SCNC: 6.7 MMOL/L (ref 5–15)
AST SERPL-CCNC: 28 U/L (ref 1–32)
BASOPHILS # BLD AUTO: 0.13 10*3/MM3 (ref 0–0.2)
BASOPHILS NFR BLD AUTO: 1 % (ref 0–1.5)
BILIRUB SERPL-MCNC: 0.3 MG/DL (ref 0–1.2)
BUN SERPL-MCNC: 13 MG/DL (ref 8–23)
BUN/CREAT SERPL: 13.7 (ref 7–25)
CALCIUM SPEC-SCNC: 9.6 MG/DL (ref 8.6–10.5)
CHLORIDE SERPL-SCNC: 103 MMOL/L (ref 98–107)
CHOLEST SERPL-MCNC: 244 MG/DL (ref 0–200)
CO2 SERPL-SCNC: 28.3 MMOL/L (ref 22–29)
CREAT SERPL-MCNC: 0.95 MG/DL (ref 0.57–1)
DEPRECATED RDW RBC AUTO: 44.8 FL (ref 37–54)
EOSINOPHIL # BLD AUTO: 0.26 10*3/MM3 (ref 0–0.4)
EOSINOPHIL NFR BLD AUTO: 2.1 % (ref 0.3–6.2)
ERYTHROCYTE [DISTWIDTH] IN BLOOD BY AUTOMATED COUNT: 14.1 % (ref 12.3–15.4)
GFR SERPL CREATININE-BSD FRML MDRD: 59 ML/MIN/1.73
GLOBULIN UR ELPH-MCNC: 2.9 GM/DL
GLUCOSE SERPL-MCNC: 78 MG/DL (ref 65–99)
HCT VFR BLD AUTO: 48.3 % (ref 34–46.6)
HDLC SERPL-MCNC: 66 MG/DL (ref 40–60)
HGB BLD-MCNC: 16.5 G/DL (ref 12–15.9)
IMM GRANULOCYTES # BLD AUTO: 0.04 10*3/MM3 (ref 0–0.05)
IMM GRANULOCYTES NFR BLD AUTO: 0.3 % (ref 0–0.5)
LDLC SERPL CALC-MCNC: 152 MG/DL (ref 0–100)
LDLC/HDLC SERPL: 2.26 {RATIO}
LYMPHOCYTES # BLD AUTO: 3.32 10*3/MM3 (ref 0.7–3.1)
LYMPHOCYTES NFR BLD AUTO: 26.5 % (ref 19.6–45.3)
MCH RBC QN AUTO: 30.2 PG (ref 26.6–33)
MCHC RBC AUTO-ENTMCNC: 34.2 G/DL (ref 31.5–35.7)
MCV RBC AUTO: 88.5 FL (ref 79–97)
MONOCYTES # BLD AUTO: 0.7 10*3/MM3 (ref 0.1–0.9)
MONOCYTES NFR BLD AUTO: 5.6 % (ref 5–12)
NEUTROPHILS NFR BLD AUTO: 64.5 % (ref 42.7–76)
NEUTROPHILS NFR BLD AUTO: 8.07 10*3/MM3 (ref 1.7–7)
NRBC BLD AUTO-RTO: 0 /100 WBC (ref 0–0.2)
PLATELET # BLD AUTO: 218 10*3/MM3 (ref 140–450)
PMV BLD AUTO: 13 FL (ref 6–12)
POTASSIUM SERPL-SCNC: 4.6 MMOL/L (ref 3.5–5.2)
PROT SERPL-MCNC: 7.9 G/DL (ref 6–8.5)
RBC # BLD AUTO: 5.46 10*6/MM3 (ref 3.77–5.28)
SODIUM SERPL-SCNC: 138 MMOL/L (ref 136–145)
T4 FREE SERPL-MCNC: 1.5 NG/DL (ref 0.93–1.7)
TRIGL SERPL-MCNC: 144 MG/DL (ref 0–150)
TSH SERPL DL<=0.05 MIU/L-ACNC: 2.89 UIU/ML (ref 0.27–4.2)
VLDLC SERPL-MCNC: 26 MG/DL (ref 5–40)
WBC # BLD AUTO: 12.52 10*3/MM3 (ref 3.4–10.8)

## 2021-10-28 PROCEDURE — 85025 COMPLETE CBC W/AUTO DIFF WBC: CPT | Performed by: FAMILY MEDICINE

## 2021-10-28 PROCEDURE — 90686 IIV4 VACC NO PRSV 0.5 ML IM: CPT | Performed by: FAMILY MEDICINE

## 2021-10-28 PROCEDURE — 80053 COMPREHEN METABOLIC PANEL: CPT | Performed by: FAMILY MEDICINE

## 2021-10-28 PROCEDURE — 90732 PPSV23 VACC 2 YRS+ SUBQ/IM: CPT | Performed by: FAMILY MEDICINE

## 2021-10-28 PROCEDURE — 99214 OFFICE O/P EST MOD 30 MIN: CPT | Performed by: FAMILY MEDICINE

## 2021-10-28 PROCEDURE — 84443 ASSAY THYROID STIM HORMONE: CPT | Performed by: FAMILY MEDICINE

## 2021-10-28 PROCEDURE — 90472 IMMUNIZATION ADMIN EACH ADD: CPT | Performed by: FAMILY MEDICINE

## 2021-10-28 PROCEDURE — 84439 ASSAY OF FREE THYROXINE: CPT | Performed by: FAMILY MEDICINE

## 2021-10-28 PROCEDURE — 80061 LIPID PANEL: CPT | Performed by: FAMILY MEDICINE

## 2021-10-28 PROCEDURE — 90471 IMMUNIZATION ADMIN: CPT | Performed by: FAMILY MEDICINE

## 2021-10-28 RX ORDER — FLUTICASONE PROPIONATE 50 MCG
SPRAY, SUSPENSION (ML) NASAL
COMMUNITY
End: 2021-10-28 | Stop reason: SDUPTHER

## 2021-10-28 RX ORDER — LEVOTHYROXINE SODIUM 0.07 MG/1
75 TABLET ORAL DAILY
Qty: 90 TABLET | Refills: 1 | Status: SHIPPED | OUTPATIENT
Start: 2021-10-28 | End: 2022-04-22

## 2021-10-28 RX ORDER — MELATONIN
1000 DAILY
COMMUNITY
End: 2022-12-12

## 2021-10-28 RX ORDER — BUDESONIDE AND FORMOTEROL FUMARATE DIHYDRATE 160; 4.5 UG/1; UG/1
AEROSOL RESPIRATORY (INHALATION)
COMMUNITY
Start: 2021-10-02 | End: 2021-10-28 | Stop reason: SDUPTHER

## 2021-10-28 RX ORDER — ASPIRIN 81 MG/1
81 TABLET, CHEWABLE ORAL DAILY
COMMUNITY
End: 2023-03-30

## 2021-10-28 RX ORDER — BUDESONIDE AND FORMOTEROL FUMARATE DIHYDRATE 160; 4.5 UG/1; UG/1
2 AEROSOL RESPIRATORY (INHALATION)
Qty: 30.6 G | Refills: 1 | Status: SHIPPED | OUTPATIENT
Start: 2021-10-28 | End: 2022-04-23

## 2021-10-28 RX ORDER — FLUTICASONE PROPIONATE 50 MCG
1 SPRAY, SUSPENSION (ML) NASAL DAILY
Qty: 48 G | Refills: 1 | Status: SHIPPED | OUTPATIENT
Start: 2021-10-28 | End: 2022-04-23

## 2021-10-28 NOTE — PROGRESS NOTES
Venipuncture Blood Specimen Collection  Venipuncture performed in left arm by Raquel Petersen with good hemostasis. Patient tolerated the procedure well without complications.   10/28/21   Raquel Petersen

## 2021-10-28 NOTE — PROGRESS NOTES
Chief Complaint  Hypothyroidism (refills ), order for a mammogram, and referral to vascular    Subjective          Nora Bone presents to [facility]  Pt needs referral to GYN- pt has h/o cervical cancer  Pt needs mammogram- all have been normal    Hypothyroidism  This is a chronic problem. The current episode started more than 1 year ago. The problem occurs intermittently. The problem has been gradually improving. Pertinent negatives include no abdominal pain, anorexia, arthralgias, change in bowel habit, chest pain, chills, congestion, coughing, diaphoresis, fatigue, fever, headaches, joint swelling, myalgias, nausea, neck pain, numbness, rash, sore throat, swollen glands, vertigo, visual change, vomiting or weakness. Nothing aggravates the symptoms. Treatments tried: synthroid. The treatment provided significant relief.   Hyperlipidemia  This is a chronic problem. The current episode started more than 1 year ago. The problem is uncontrolled. Recent lipid tests were reviewed and are variable. Exacerbating diseases include hypothyroidism. There are no known factors aggravating her hyperlipidemia. Pertinent negatives include no chest pain, focal sensory loss, focal weakness, leg pain, myalgias or shortness of breath. Current antihyperlipidemic treatment includes diet change. The current treatment provides mild improvement of lipids. There are no compliance problems.  Risk factors for coronary artery disease include dyslipidemia and post-menopausal.       Objective   Allergies   Allergen Reactions   • Hydrocodone-Acetaminophen Nausea And Vomiting     Also reports shaking        Immunization History   Administered Date(s) Administered   • COVID-19 (MODERNA) 08/20/2021, 09/17/2021   • Flu Vaccine Quad PF >36MO 02/07/2017, 09/27/2018, 10/15/2019, 01/12/2021   • Hepatitis A 03/13/2018, 09/13/2018   • Tdap 09/27/2018       Vital Signs:   Vitals:    10/28/21 1305   BP: 122/88   Pulse: 76  "  Temp: 97.7 °F (36.5 °C)   Weight: 59.4 kg (131 lb)   Height: 160 cm (63\")       Physical Exam  Vitals reviewed. Exam conducted with a chaperone present.   Constitutional:       Appearance: Normal appearance. She is well-developed.   HENT:      Head: Normocephalic and atraumatic.      Right Ear: External ear normal.      Left Ear: External ear normal.      Mouth/Throat:      Mouth: Mucous membranes are moist.      Pharynx: Oropharynx is clear. No oropharyngeal exudate or posterior oropharyngeal erythema.   Eyes:      Conjunctiva/sclera: Conjunctivae normal.      Pupils: Pupils are equal, round, and reactive to light.   Cardiovascular:      Rate and Rhythm: Normal rate and regular rhythm.      Pulses: Normal pulses.      Heart sounds: Normal heart sounds. No murmur heard.  No friction rub. No gallop.    Pulmonary:      Effort: Pulmonary effort is normal.      Breath sounds: Normal breath sounds. No wheezing or rhonchi.   Chest:   Breasts:      Jose Score is 5. Breasts are symmetrical.      Right: Normal. No swelling, bleeding, inverted nipple, mass, nipple discharge, skin change, tenderness, axillary adenopathy or supraclavicular adenopathy.      Left: Normal. No swelling, bleeding, inverted nipple, mass, nipple discharge, skin change, tenderness, axillary adenopathy or supraclavicular adenopathy.       Abdominal:      General: Bowel sounds are normal. There is no distension.      Palpations: Abdomen is soft. There is no mass.      Tenderness: There is no abdominal tenderness. There is no guarding or rebound.      Hernia: No hernia is present.   Musculoskeletal:         General: Normal range of motion.      Cervical back: Normal range of motion and neck supple.   Lymphadenopathy:      Upper Body:      Right upper body: No supraclavicular, axillary or pectoral adenopathy.      Left upper body: No supraclavicular, axillary or pectoral adenopathy.   Skin:     General: Skin is warm and dry.      Capillary Refill: " Capillary refill takes less than 2 seconds.   Neurological:      General: No focal deficit present.      Mental Status: She is alert and oriented to person, place, and time.      Cranial Nerves: No cranial nerve deficit.   Psychiatric:         Mood and Affect: Mood and affect normal.         Behavior: Behavior normal.         Thought Content: Thought content normal.         Judgment: Judgment normal.        Result Review :   The following data was reviewed by: Jarrod Simms MD on 10/28/2021:  CMP    CMP 3/25/21 4/26/21   Glucose 89 85   BUN 15 10   Creatinine 1.00 (A) 1.00 (A)   Sodium 142 140   Potassium 4.8 4.7   Chloride 102 102   Calcium 9.7 9.0   Albumin 4.7    Total Bilirubin 0.33    Alkaline Phosphatase 86    AST (SGOT) 19    ALT (SGPT) 16    (A) Abnormal value            CBC    CBC 2/5/21 3/25/21 4/26/21   WBC 9.35 10.88 (A) 8.39   RBC 5.72 (A) 5.44 (A) 5.19   Hemoglobin 16.7 (A) 15.8 15.3   Hematocrit 51.8 (A) 49.0 (A) 46.9   MCV 90.6 90.1 90.4   MCH 29.2 29.0 29.5   MCHC 32.2 (A) 32.2 (A) 32.6 (A)   RDW 14.0 14.5 (A) 14.4   Platelets 199 189 150   (A) Abnormal value       Comments are available for some flowsheets but are not being displayed.           Lipid Panel    Lipid Panel 3/25/21   Total Cholesterol 226 (A)   Triglycerides 140   HDL Cholesterol 68 (A)   VLDL Cholesterol 28   LDL Cholesterol  130 (A)   (A) Abnormal value       Comments are available for some flowsheets but are not being displayed.           TSH    TSH 3/25/21   TSH 0.520                     Assessment and Plan    Diagnoses and all orders for this visit:    1. History of cervical cancer (Primary)  -     Ambulatory Referral to Gynecology    2. Varicose veins of bilateral lower extremities with pain  -     Ambulatory Referral to Vascular Surgery    3. Venous insufficiency (chronic) (peripheral)  -     Ambulatory Referral to Vascular Surgery    4. Hypothyroidism, unspecified type  -     levothyroxine (SYNTHROID, LEVOTHROID) 75 MCG  tablet; Take 1 tablet by mouth Daily.  Dispense: 90 tablet; Refill: 1  -     TSH+Free T4    5. Simple chronic bronchitis (HCC)  -     Symbicort 160-4.5 MCG/ACT inhaler; Inhale 2 puffs 2 (Two) Times a Day.  Dispense: 30.6 g; Refill: 1  -     CBC Auto Differential    6. Non-seasonal allergic rhinitis, unspecified trigger  -     fluticasone (FLONASE) 50 MCG/ACT nasal spray; 1 spray into the nostril(s) as directed by provider Daily.  Dispense: 48 g; Refill: 1    7. Need for influenza vaccination  -     FluLaval/Fluarix/Fluzone >6 Months ([id number])    8. Need for pneumococcal vaccination  -     pneumococcal polysaccharide 23-valent (PNEUMOVAX-23) vaccine 0.5 mL    9. Mixed hyperlipidemia  -     CBC Auto Differential  -     Comprehensive Metabolic Panel  -     Lipid Panel    10. Normal breast exam  -     Mammo Screening Bilateral With CAD; Future            Follow Up   Return in about 6 months (around 4/28/2022) for Recheck.  Patient was given instructions and counseling regarding her condition or for health maintenance advice. Please see specific information pulled into the AVS if appropriate.

## 2021-10-29 NOTE — PROGRESS NOTES
Labs show no significant abnormalities except for elevated WBC's and cholesterol.  Follow-up in 1-2 weeks to discuss this if you do not already see someone regarding your WBC's on your CBC.

## 2021-11-08 ENCOUNTER — HOSPITAL ENCOUNTER (OUTPATIENT)
Dept: MAMMOGRAPHY | Facility: HOSPITAL | Age: 66
Discharge: HOME | End: 2021-11-08
Admitting: FAMILY MEDICINE

## 2021-11-08 DIAGNOSIS — Z00.00 NORMAL BREAST EXAM: ICD-10-CM

## 2021-11-08 PROCEDURE — 77067 SCR MAMMO BI INCL CAD: CPT

## 2021-11-12 ENCOUNTER — OFFICE VISIT (OUTPATIENT)
Dept: OBSTETRICS AND GYNECOLOGY | Facility: CLINIC | Age: 66
End: 2021-11-12

## 2021-11-12 VITALS
WEIGHT: 130 LBS | SYSTOLIC BLOOD PRESSURE: 173 MMHG | HEIGHT: 64 IN | BODY MASS INDEX: 22.2 KG/M2 | HEART RATE: 103 BPM | DIASTOLIC BLOOD PRESSURE: 92 MMHG

## 2021-11-12 DIAGNOSIS — Z01.419 ENCOUNTER FOR GYNECOLOGICAL EXAMINATION WITHOUT ABNORMAL FINDING: Primary | ICD-10-CM

## 2021-11-12 PROCEDURE — 99386 PREV VISIT NEW AGE 40-64: CPT | Performed by: OBSTETRICS & GYNECOLOGY

## 2021-11-12 PROCEDURE — 2014F MENTAL STATUS ASSESS: CPT | Performed by: OBSTETRICS & GYNECOLOGY

## 2021-11-12 PROCEDURE — 3008F BODY MASS INDEX DOCD: CPT | Performed by: OBSTETRICS & GYNECOLOGY

## 2021-11-12 NOTE — PROGRESS NOTES
"Well Woman Visit    CC: Annual well woman exam       HPI:   66 y.o. Contraception or HRT: None  Menses:  none  Pain:  None  Incontinence concerns: Yes, doing kegels and sx improving  Hx of abnormal pap:  Yes, cervical cancer/hysterectomy in her 20s, normal paps since then  Pt has no complaints today.      History: PMHx, Meds, Allergies, PSHx, Social Hx, and POBHx all reviewed and updated.      PHYSICAL EXAM:  /92   Pulse 103   Ht 161.3 cm (63.5\")   Wt 59 kg (130 lb)   Breastfeeding No   BMI 22.67 kg/m²   General- NAD, alert and oriented, appropriate  Psych- Normal mood, good memory  Neck- No masses, no thyroid enlargement  CV- Regular rhythm, no murnurs  Resp- CTA to bases, no wheezes  Abdomen- Soft, non distended, non tender, no masses    Breast left-  Bilaterally symmetrical, no masses, non tender, no nipple discharge  Breast right- Bilaterally symmetrical, no masses, non tender, no nipple discharge    External genitalia- Normal female, no lesions  Urethra/meatus- Normal, no masses, non tender, no prolapse  Bladder- Normal, no masses, non tender, no prolapse  Vagina- Normal, no atrophy, no lesions, no discharge, no prolapse  Cvx- Absent  Uterus- Absent  Adnexa- No mass, non tender, Difficult to palpate  Anus/Rectum/Perineum- Small hemorrhoids, non thrombosed, Hemoccult neg    Lymphatic- No palpable neck, axillary, or groin nodes  Ext- No edema, no cyanosis    Skin- No lesions, no rashes, no acanthosis nigricans        ASSESSMENT and PLAN:  WWE    Diagnoses and all orders for this visit:    1. Encounter for gynecological examination without abnormal finding (Primary)        Domestic violence/abuse screen: negative    Depression screen: no SI    Preventative:   BREAST HEALTH- Monthly self breast exam importance and how to reviewed. MMG and/or MRI (prn) reviewed per society guidelines and her individual history. Mammo/MRI screen: Already up to date.  CERVICAL CANCER Screening- Reviewed current ASCCP " guidelines for screening w and wo cotest HPV, age specific.  Screen: Not medically needed.  COLON CANCER Screening- Reviewed current medical society guidelines and options.  Colonoscopy screen:  Already up to date.  BONE HEALTH- Reviewed current medical society guidelines and options for testing, calcium and vit D intake.  Weight bearing exercise.  DEXA: Already up to date.  VACCINATIONS Recommended: Flu annually, Zoster (49yo and older), Pneumococcal (66yo and older).  Importance discussed, risk being unvaccinated reviewed.  Questions answered  Smoking status- NON SMOKER.  Importance of avoiding second hand smoke.  Follow up PCP/Specialist PMHx and Labs  Myriad: Does not qualify.      She understands the importance of having any ordered tests to be performed in a timely fashion.  She is encouraged to review her results online and/or contact or office if she has questions.     Follow Up:  Return if symptoms worsen or fail to improve.      Judy Burks, APRN  11/12/2021

## 2022-04-22 DIAGNOSIS — E03.9 HYPOTHYROIDISM, UNSPECIFIED TYPE: ICD-10-CM

## 2022-04-22 RX ORDER — LEVOTHYROXINE SODIUM 0.07 MG/1
75 TABLET ORAL DAILY
Qty: 90 TABLET | Refills: 0 | Status: SHIPPED | OUTPATIENT
Start: 2022-04-22 | End: 2022-07-08

## 2022-04-23 DIAGNOSIS — J41.0 SIMPLE CHRONIC BRONCHITIS: ICD-10-CM

## 2022-04-23 DIAGNOSIS — J30.89 NON-SEASONAL ALLERGIC RHINITIS, UNSPECIFIED TRIGGER: ICD-10-CM

## 2022-04-23 RX ORDER — BUDESONIDE AND FORMOTEROL FUMARATE DIHYDRATE 160; 4.5 UG/1; UG/1
AEROSOL RESPIRATORY (INHALATION)
Qty: 30.6 G | Refills: 1 | Status: SHIPPED | OUTPATIENT
Start: 2022-04-23 | End: 2022-08-15 | Stop reason: CLARIF

## 2022-04-23 RX ORDER — FLUTICASONE PROPIONATE 50 MCG
SPRAY, SUSPENSION (ML) NASAL
Qty: 48 G | Refills: 1 | Status: SHIPPED | OUTPATIENT
Start: 2022-04-23 | End: 2022-09-09 | Stop reason: SDUPTHER

## 2022-07-08 DIAGNOSIS — E03.9 HYPOTHYROIDISM, UNSPECIFIED TYPE: ICD-10-CM

## 2022-07-08 RX ORDER — LEVOTHYROXINE SODIUM 0.07 MG/1
75 TABLET ORAL DAILY
Qty: 90 TABLET | Refills: 0 | Status: SHIPPED | OUTPATIENT
Start: 2022-07-08 | End: 2022-09-01 | Stop reason: SDUPTHER

## 2022-08-15 DIAGNOSIS — J41.0 SIMPLE CHRONIC BRONCHITIS: Primary | ICD-10-CM

## 2022-08-16 ENCOUNTER — TELEPHONE (OUTPATIENT)
Dept: FAMILY MEDICINE CLINIC | Facility: CLINIC | Age: 67
End: 2022-08-16

## 2022-08-16 NOTE — TELEPHONE ENCOUNTER
Let pt know that we tried to refill symbicort, but her insurance is not allowing this med any longer.  They allow breo, which is in the same class of medications.  We had received a note from her pharmacy alerting us to this.  She should start breo when she is out of symbicort.  Thanks.

## 2022-08-16 NOTE — TELEPHONE ENCOUNTER
Patient said she got Breo and that she takes Symbicort 160/4.5 mcg can you the symbicort is not on current medication list but she states that you wrote it. She is also making appt at this time.

## 2022-08-18 ENCOUNTER — OFFICE VISIT (OUTPATIENT)
Dept: FAMILY MEDICINE CLINIC | Facility: CLINIC | Age: 67
End: 2022-08-18

## 2022-08-18 VITALS
DIASTOLIC BLOOD PRESSURE: 80 MMHG | OXYGEN SATURATION: 98 % | BODY MASS INDEX: 22.39 KG/M2 | TEMPERATURE: 98.2 F | SYSTOLIC BLOOD PRESSURE: 118 MMHG | HEART RATE: 75 BPM | WEIGHT: 128.4 LBS

## 2022-08-18 DIAGNOSIS — R53.83 FATIGUE, UNSPECIFIED TYPE: ICD-10-CM

## 2022-08-18 DIAGNOSIS — R29.898 WEAKNESS OF BOTH LOWER EXTREMITIES: ICD-10-CM

## 2022-08-18 DIAGNOSIS — J41.0 SIMPLE CHRONIC BRONCHITIS: Primary | ICD-10-CM

## 2022-08-18 DIAGNOSIS — M62.838 MUSCLE SPASMS OF BOTH LOWER EXTREMITIES: ICD-10-CM

## 2022-08-18 DIAGNOSIS — E78.2 MIXED HYPERLIPIDEMIA: ICD-10-CM

## 2022-08-18 DIAGNOSIS — M79.604 PAIN IN BOTH LOWER EXTREMITIES: ICD-10-CM

## 2022-08-18 DIAGNOSIS — M79.605 PAIN IN BOTH LOWER EXTREMITIES: ICD-10-CM

## 2022-08-18 DIAGNOSIS — R09.89 DECREASED PEDAL PULSES: ICD-10-CM

## 2022-08-18 LAB
ALBUMIN SERPL-MCNC: 4.3 G/DL (ref 3.5–5.2)
ALBUMIN/GLOB SERPL: 1.5 G/DL
ALP SERPL-CCNC: 93 U/L (ref 39–117)
ALT SERPL W P-5'-P-CCNC: 20 U/L (ref 1–33)
ANION GAP SERPL CALCULATED.3IONS-SCNC: 10.4 MMOL/L (ref 5–15)
AST SERPL-CCNC: 28 U/L (ref 1–32)
BASOPHILS # BLD AUTO: 0.12 10*3/MM3 (ref 0–0.2)
BASOPHILS NFR BLD AUTO: 1.2 % (ref 0–1.5)
BILIRUB SERPL-MCNC: 0.2 MG/DL (ref 0–1.2)
BUN SERPL-MCNC: 12 MG/DL (ref 8–23)
BUN/CREAT SERPL: 12.1 (ref 7–25)
CALCIUM SPEC-SCNC: 9.4 MG/DL (ref 8.6–10.5)
CHLORIDE SERPL-SCNC: 102 MMOL/L (ref 98–107)
CHOLEST SERPL-MCNC: 212 MG/DL (ref 0–200)
CK SERPL-CCNC: 421 U/L (ref 20–180)
CO2 SERPL-SCNC: 27.6 MMOL/L (ref 22–29)
CREAT SERPL-MCNC: 0.99 MG/DL (ref 0.57–1)
DEPRECATED RDW RBC AUTO: 41.5 FL (ref 37–54)
EGFRCR SERPLBLD CKD-EPI 2021: 63.4 ML/MIN/1.73
EOSINOPHIL # BLD AUTO: 0.25 10*3/MM3 (ref 0–0.4)
EOSINOPHIL NFR BLD AUTO: 2.6 % (ref 0.3–6.2)
ERYTHROCYTE [DISTWIDTH] IN BLOOD BY AUTOMATED COUNT: 13 % (ref 12.3–15.4)
FOLATE SERPL-MCNC: 7.87 NG/ML (ref 4.78–24.2)
GLOBULIN UR ELPH-MCNC: 2.8 GM/DL
GLUCOSE SERPL-MCNC: 91 MG/DL (ref 65–99)
HCT VFR BLD AUTO: 44.5 % (ref 34–46.6)
HDLC SERPL-MCNC: 60 MG/DL (ref 40–60)
HGB BLD-MCNC: 14.7 G/DL (ref 12–15.9)
IMM GRANULOCYTES # BLD AUTO: 0.02 10*3/MM3 (ref 0–0.05)
IMM GRANULOCYTES NFR BLD AUTO: 0.2 % (ref 0–0.5)
LDLC SERPL CALC-MCNC: 129 MG/DL (ref 0–100)
LDLC/HDLC SERPL: 2.11 {RATIO}
LYMPHOCYTES # BLD AUTO: 3.23 10*3/MM3 (ref 0.7–3.1)
LYMPHOCYTES NFR BLD AUTO: 33.3 % (ref 19.6–45.3)
MAGNESIUM SERPL-MCNC: 2.3 MG/DL (ref 1.6–2.4)
MCH RBC QN AUTO: 28.9 PG (ref 26.6–33)
MCHC RBC AUTO-ENTMCNC: 33 G/DL (ref 31.5–35.7)
MCV RBC AUTO: 87.4 FL (ref 79–97)
MONOCYTES # BLD AUTO: 0.73 10*3/MM3 (ref 0.1–0.9)
MONOCYTES NFR BLD AUTO: 7.5 % (ref 5–12)
NEUTROPHILS NFR BLD AUTO: 5.35 10*3/MM3 (ref 1.7–7)
NEUTROPHILS NFR BLD AUTO: 55.2 % (ref 42.7–76)
NRBC BLD AUTO-RTO: 0 /100 WBC (ref 0–0.2)
PLATELET # BLD AUTO: 214 10*3/MM3 (ref 140–450)
PMV BLD AUTO: 13 FL (ref 6–12)
POTASSIUM SERPL-SCNC: 4.4 MMOL/L (ref 3.5–5.2)
PROT SERPL-MCNC: 7.1 G/DL (ref 6–8.5)
RBC # BLD AUTO: 5.09 10*6/MM3 (ref 3.77–5.28)
SODIUM SERPL-SCNC: 140 MMOL/L (ref 136–145)
T4 FREE SERPL-MCNC: 1.04 NG/DL (ref 0.93–1.7)
TRIGL SERPL-MCNC: 128 MG/DL (ref 0–150)
TSH SERPL DL<=0.05 MIU/L-ACNC: 9.73 UIU/ML (ref 0.27–4.2)
VIT B12 BLD-MCNC: 297 PG/ML (ref 211–946)
VLDLC SERPL-MCNC: 23 MG/DL (ref 5–40)
WBC NRBC COR # BLD: 9.7 10*3/MM3 (ref 3.4–10.8)

## 2022-08-18 PROCEDURE — 82550 ASSAY OF CK (CPK): CPT | Performed by: FAMILY MEDICINE

## 2022-08-18 PROCEDURE — 84439 ASSAY OF FREE THYROXINE: CPT | Performed by: FAMILY MEDICINE

## 2022-08-18 PROCEDURE — 99214 OFFICE O/P EST MOD 30 MIN: CPT | Performed by: FAMILY MEDICINE

## 2022-08-18 PROCEDURE — 80061 LIPID PANEL: CPT | Performed by: FAMILY MEDICINE

## 2022-08-18 PROCEDURE — 82607 VITAMIN B-12: CPT | Performed by: FAMILY MEDICINE

## 2022-08-18 PROCEDURE — 36415 COLL VENOUS BLD VENIPUNCTURE: CPT | Performed by: FAMILY MEDICINE

## 2022-08-18 PROCEDURE — 83735 ASSAY OF MAGNESIUM: CPT | Performed by: FAMILY MEDICINE

## 2022-08-18 PROCEDURE — 85025 COMPLETE CBC W/AUTO DIFF WBC: CPT | Performed by: FAMILY MEDICINE

## 2022-08-18 PROCEDURE — 84443 ASSAY THYROID STIM HORMONE: CPT | Performed by: FAMILY MEDICINE

## 2022-08-18 PROCEDURE — 82746 ASSAY OF FOLIC ACID SERUM: CPT | Performed by: FAMILY MEDICINE

## 2022-08-18 PROCEDURE — 80053 COMPREHEN METABOLIC PANEL: CPT | Performed by: FAMILY MEDICINE

## 2022-08-18 RX ORDER — BUDESONIDE AND FORMOTEROL FUMARATE DIHYDRATE 160; 4.5 UG/1; UG/1
2 AEROSOL RESPIRATORY (INHALATION)
COMMUNITY
End: 2022-08-18 | Stop reason: CLARIF

## 2022-08-18 NOTE — PROGRESS NOTES
Chief Complaint  Asthma (reflls)    Subjective          Nora Bone presents to [facility]  Pt has been having trouble with strength in both legs since having vascular surgery last year  Pt has had intermittent fatigue  Pt has bilateral leg pain L>R worsening over last year- pt has quit smoking    Hyperlipidemia  This is a chronic problem. The current episode started more than 1 year ago. The problem is uncontrolled. Recent lipid tests were reviewed and are variable. There are no known factors aggravating her hyperlipidemia. Pertinent negatives include no chest pain, focal sensory loss, focal weakness, leg pain, myalgias or shortness of breath. Current antihyperlipidemic treatment includes diet change. The current treatment provides mild improvement of lipids. There are no compliance problems.  Risk factors for coronary artery disease include dyslipidemia, post-menopausal and family history.       Objective   Allergies   Allergen Reactions   • Hydrocodone-Acetaminophen Nausea And Vomiting     Also reports shaking        Immunization History   Administered Date(s) Administered   • Flu Vaccine Quad PF >36MO 02/07/2017, 09/27/2018, 10/15/2019, 01/12/2021   • FluLaval/Fluzone >6mos 10/28/2021   • Hepatitis A 03/13/2018, 09/13/2018   • Pneumococcal Polysaccharide (PPSV23) 10/28/2021   • Tdap 09/27/2018     Past Medical History:   Diagnosis Date   • Anemia    • Anxiety    • Asthma    • Back pain    • Cancer (HCC)    • Cervical cancer (HCC)    • COPD (chronic obstructive pulmonary disease) (HCC)    • Deep vein thrombosis (HCC)    • Disease of thyroid gland    • Diverticulosis    • Hearing loss    • Peripheral vascular disease (HCC)       Past Surgical History:   Procedure Laterality Date   • COLONOSCOPY W/ BIOPSIES     • DILATATION AND CURETTAGE     • VAGINAL HYSTERECTOMY     • VEIN SURGERY Right       Social History     Socioeconomic History   • Marital status:    Tobacco Use   •  Smoking status: Former Smoker     Packs/day: 1.00     Years: 20.00     Pack years: 20.00     Types: Cigarettes   • Smokeless tobacco: Never Used   Vaping Use   • Vaping Use: Former   • Substances: Nicotine, THC, Flavoring   • Devices: Disposable, Pre-filled or refillable cartridge, Refillable tank, Pre-filled pod   Substance and Sexual Activity   • Alcohol use: Never   • Drug use: Not Currently     Types: Marijuana   • Sexual activity: Yes     Birth control/protection: Surgical        Current Outpatient Medications:   •  aspirin 81 MG chewable tablet, Chew 81 mg Daily., Disp: , Rfl:   •  cholecalciferol (VITAMIN D3) 25 MCG (1000 UT) tablet, Take 1,000 Units by mouth Daily., Disp: , Rfl:   •  fluticasone (FLONASE) 50 MCG/ACT nasal spray, USE 1 SPRAY INTO THE NOSTRIL AS DIRECTED DAILY, Disp: 48 g, Rfl: 1  •  levothyroxine (SYNTHROID, LEVOTHROID) 75 MCG tablet, TAKE 1 TABLET BY MOUTH DAILY., Disp: 90 tablet, Rfl: 0  •  Fluticasone Furoate-Vilanterol (Breo Ellipta) 200-25 MCG/INH inhaler, Inhale 1 puff Daily., Disp: 60 each, Rfl: 3   Family History   Problem Relation Age of Onset   • Ovarian cancer Mother    • Heart disease Mother    • Thyroid disease Mother    • Breast cancer Sister    • Breast cancer Paternal Aunt    • Epilepsy Father           Vital Signs:   Vitals:    08/18/22 1439   BP: 118/80   Pulse: 75   Temp: 98.2 °F (36.8 °C)   SpO2: 98%   Weight: 58.2 kg (128 lb 6.4 oz)       Review of Systems   Respiratory: Negative for shortness of breath.    Cardiovascular: Negative for chest pain.   Musculoskeletal: Negative for myalgias.   Neurological: Negative for focal weakness.      Physical Exam  Vitals reviewed.   Constitutional:       Appearance: Normal appearance. She is well-developed.   HENT:      Head: Normocephalic and atraumatic.      Right Ear: External ear normal.      Left Ear: External ear normal.      Mouth/Throat:      Pharynx: No oropharyngeal exudate.   Eyes:      Conjunctiva/sclera: Conjunctivae  normal.      Pupils: Pupils are equal, round, and reactive to light.   Cardiovascular:      Rate and Rhythm: Normal rate and regular rhythm.      Pulses: Normal pulses.      Heart sounds: Normal heart sounds. No murmur heard.    No friction rub. No gallop.      Comments: Decreased pedal pulses bilaterally.  Pulmonary:      Effort: Pulmonary effort is normal.      Breath sounds: Normal breath sounds. No wheezing or rhonchi.   Abdominal:      General: Abdomen is flat. Bowel sounds are normal. There is no distension.      Palpations: Abdomen is soft. There is no mass.      Tenderness: There is no abdominal tenderness. There is no guarding or rebound.      Hernia: No hernia is present.   Musculoskeletal:         General: Normal range of motion.   Skin:     General: Skin is warm and dry.      Capillary Refill: Capillary refill takes less than 2 seconds.   Neurological:      General: No focal deficit present.      Mental Status: She is alert and oriented to person, place, and time.      Cranial Nerves: No cranial nerve deficit.   Psychiatric:         Mood and Affect: Mood and affect normal.         Behavior: Behavior normal.         Thought Content: Thought content normal.         Judgment: Judgment normal.        Result Review :   The following data was reviewed by: Jarrod Simms MD on 08/18/2022:  CMP    CMP 10/28/21   Glucose 78   BUN 13   Creatinine 0.95   eGFR Non African Am 59 (A)   Sodium 138   Potassium 4.6   Chloride 103   Calcium 9.6   Albumin 5.00   Total Bilirubin 0.3   Alkaline Phosphatase 96   AST (SGOT) 28   ALT (SGPT) 20   (A) Abnormal value            CBC    CBC 10/28/21   WBC 12.52 (A)   RBC 5.46 (A)   Hemoglobin 16.5 (A)   Hematocrit 48.3 (A)   MCV 88.5   MCH 30.2   MCHC 34.2   RDW 14.1   Platelets 218   (A) Abnormal value            Lipid Panel    Lipid Panel 10/28/21   Total Cholesterol 244 (A)   Triglycerides 144   HDL Cholesterol 66 (A)   VLDL Cholesterol 26   LDL Cholesterol  152 (A)   LDL/HDL  Ratio 2.26   (A) Abnormal value            TSH    TSH 10/28/21   TSH 2.890                     Assessment and Plan    Diagnoses and all orders for this visit:    1. Simple chronic bronchitis (HCC) (Primary)  -     Fluticasone Furoate-Vilanterol (Breo Ellipta) 200-25 MCG/INH inhaler; Inhale 1 puff Daily.  Dispense: 60 each; Refill: 3  -     CBC Auto Differential    2. Weakness of both lower extremities  -     Ambulatory Referral to Physical Therapy  -     CBC Auto Differential    3. Mixed hyperlipidemia  -     Comprehensive Metabolic Panel  -     CK  -     Lipid Panel    4. Fatigue, unspecified type  -     TSH+Free T4  -     Vitamin B12 & Folate    5. Muscle spasms of both lower extremities  -     Magnesium    6. Pain in both lower extremities  -     Duplex Arterial Temporal CAR; Future  -     Ambulatory Referral to Vascular Surgery    7. Decreased pedal pulses  -     Duplex Arterial Temporal CAR; Future  -     Ambulatory Referral to Vascular Surgery            Follow Up   Return if symptoms worsen or fail to improve.  Patient was given instructions and counseling regarding her condition or for health maintenance advice. Please see specific information pulled into the AVS if appropriate.

## 2022-08-18 NOTE — PROGRESS NOTES
Venipuncture Blood Specimen Collection  Venipuncture performed in left arm by Raquel Petersen with good hemostasis. Patient tolerated the procedure well without complications.   08/18/22   Raquel Petersen

## 2022-08-19 NOTE — PROGRESS NOTES
You have some abnormal labs that need to be discussed.  Follow-up in 1-2 weeks to discuss and recheck labs.

## 2022-08-26 ENCOUNTER — OFFICE VISIT (OUTPATIENT)
Dept: VASCULAR SURGERY | Facility: HOSPITAL | Age: 67
End: 2022-08-26

## 2022-08-26 ENCOUNTER — HOSPITAL ENCOUNTER (OUTPATIENT)
Dept: CARDIOLOGY | Facility: HOSPITAL | Age: 67
Discharge: HOME | End: 2022-08-26

## 2022-08-26 VITALS
RESPIRATION RATE: 16 BRPM | SYSTOLIC BLOOD PRESSURE: 122 MMHG | HEART RATE: 68 BPM | DIASTOLIC BLOOD PRESSURE: 80 MMHG | TEMPERATURE: 97.9 F | OXYGEN SATURATION: 98 %

## 2022-08-26 DIAGNOSIS — M79.604 PAIN IN BOTH LOWER EXTREMITIES: ICD-10-CM

## 2022-08-26 DIAGNOSIS — I83.893 VARICOSE VEINS OF BILATERAL LOWER EXTREMITIES WITH OTHER COMPLICATIONS: Primary | ICD-10-CM

## 2022-08-26 DIAGNOSIS — R09.89 DECREASED PEDAL PULSES: ICD-10-CM

## 2022-08-26 DIAGNOSIS — M79.605 PAIN IN BOTH LOWER EXTREMITIES: ICD-10-CM

## 2022-08-26 LAB
BH CV LOWER ARTERIAL LEFT ABI RATIO: 0.91
BH CV LOWER ARTERIAL LEFT DORSALIS PEDIS SYS MAX: 129
BH CV LOWER ARTERIAL LEFT GREAT TOE SYS MAX: 100
BH CV LOWER ARTERIAL LEFT LOW THIGH SYS MAX: 136
BH CV LOWER ARTERIAL LEFT POPLITEAL SYS MAX: 118
BH CV LOWER ARTERIAL LEFT POST TIBIAL SYS MAX: 136
BH CV LOWER ARTERIAL LEFT TBI RATIO: 0.67
BH CV LOWER ARTERIAL RIGHT ABI RATIO: 1.03
BH CV LOWER ARTERIAL RIGHT DORSALIS PEDIS SYS MAX: 154
BH CV LOWER ARTERIAL RIGHT GREAT TOE SYS MAX: 126
BH CV LOWER ARTERIAL RIGHT LOW THIGH SYS MAX: 152
BH CV LOWER ARTERIAL RIGHT POPLITEAL SYS MAX: 155
BH CV LOWER ARTERIAL RIGHT POST TIBIAL SYS MAX: 151
BH CV LOWER ARTERIAL RIGHT TBI RATIO: 0.84
MAXIMAL PREDICTED HEART RATE: 155 BPM
STRESS TARGET HR: 132 BPM
UPPER ARTERIAL LEFT ARM BRACHIAL SYS MAX: 149 MMHG
UPPER ARTERIAL RIGHT ARM BRACHIAL SYS MAX: 150 MMHG

## 2022-08-26 PROCEDURE — 99203 OFFICE O/P NEW LOW 30 MIN: CPT | Performed by: NURSE PRACTITIONER

## 2022-08-26 PROCEDURE — 93923 UPR/LXTR ART STDY 3+ LVLS: CPT

## 2022-08-26 PROCEDURE — 93923 UPR/LXTR ART STDY 3+ LVLS: CPT | Performed by: SURGERY

## 2022-08-26 NOTE — PROGRESS NOTES
[facility] Vascular Surgery New Patient Office Note     Date of Encounter: 08/26/2022     MRN Number: [id number]  Name: Nora Bone  Phone Number: [phone]     Referred By: Jarrod Simms MD  PCP: Jarrod Simms MD    Chief Complaint:    Chief Complaint   Patient presents with   • Leg Pain     BILAT LEG PAIN /      PREV RIGHT LEG VARICOSE VEIN SURGERY April 2021 BY DR MEIER/ PT STATES SHE IS HERE FOR LEFT LEG VEIN THROBBING AND RIGHT LEG PAIN IN THE UPPER THIGH WHERE PREVIOUS SURGERY WAS PERFORMED       Subjective      History of Present Illness:    Nora Bone is a 67 y.o. female presents with bilateral leg pain.  She states that the vein in her left lower extremity posteriorly is throbbing and keeping her awake at night.  She also states that her muscles are cramping and twisting at night.  He denies any claudication or ischemic rest pain.  She had a great saphenous vein ablation and lower extremity stab phlebectomy performed by Dr. Meier in [place] in 2021.  She states that she is unable to get back and forth to [place] to continue follow-ups with Dr. Meier.  Her right leg does not bother her as much as the left.  She does take a daily 81 mg aspirin and is a former smoker who smoked approximately 1 pack/day for 20 years.  No other complaints at this time    Review of Systems:  ROS  Review of Systems   Constitutional: Negative.   HENT: Negative.    Cardiovascular: Negative.    Respiratory: Negative.    Skin:  Varicose and spider veins, pain in the left lower extremity..    Musculoskeletal: Negative.    Gastrointestinal: Negative.    Neurological: Negative.    Psychiatric/Behavioral: Negative.      I have reviewed the following portions of the patient's history: allergies, current medications, past family history, past medical history, past social history, past surgical history and problem list and confirm it's accurate.    Allergies:  Allergies   Allergen Reactions   •  Hydrocodone-Acetaminophen Nausea And Vomiting     Also reports shaking          Medications:      Current Outpatient Medications:   •  aspirin 81 MG chewable tablet, Chew 81 mg Daily., Disp: , Rfl:   •  cholecalciferol (VITAMIN D3) 25 MCG (1000 UT) tablet, Take 1,000 Units by mouth Daily., Disp: , Rfl:   •  fluticasone (FLONASE) 50 MCG/ACT nasal spray, USE 1 SPRAY INTO THE NOSTRIL AS DIRECTED DAILY, Disp: 48 g, Rfl: 1  •  Fluticasone Furoate-Vilanterol (Breo Ellipta) 200-25 MCG/INH inhaler, Inhale 1 puff Daily., Disp: 60 each, Rfl: 3  •  levothyroxine (SYNTHROID, LEVOTHROID) 75 MCG tablet, TAKE 1 TABLET BY MOUTH DAILY., Disp: 90 tablet, Rfl: 0    History:   Past Medical History:   Diagnosis Date   • Anemia    • Anxiety    • Asthma    • Back pain    • Cancer (HCC)    • Cervical cancer (HCC)    • COPD (chronic obstructive pulmonary disease) (HCC)    • Deep vein thrombosis (HCC)    • Disease of thyroid gland    • Diverticulosis    • Hearing loss    • Peripheral vascular disease (HCC)        Past Surgical History:   Procedure Laterality Date   • COLONOSCOPY W/ BIOPSIES     • DILATATION AND CURETTAGE     • VAGINAL HYSTERECTOMY     • VEIN SURGERY Right        Social History     Socioeconomic History   • Marital status: Single   Tobacco Use   • Smoking status: Former Smoker     Packs/day: 1.00     Years: 20.00     Pack years: 20.00     Types: Cigarettes   • Smokeless tobacco: Never Used   Vaping Use   • Vaping Use: Former   Substance and Sexual Activity   • Alcohol use: Never   • Drug use: Yes     Types: Marijuana   • Sexual activity: Yes     Birth control/protection: Surgical        Family History   Problem Relation Age of Onset   • Ovarian cancer Mother    • Heart disease Mother    • Thyroid disease Mother    • Breast cancer Sister    • Breast cancer Paternal Aunt    • Epilepsy Father        Objective     Physical Exam:  Vitals:    08/26/22 0959   BP: 122/80   BP Location: Left arm   Patient Position: Sitting   Cuff  Size: Adult   Pulse: 68   Resp: 16   Temp: 97.9 °F (36.6 °C)   TempSrc: Temporal   SpO2: 98%      There is no height or weight on file to calculate BMI.    Physical Exam  Physical Exam  Constitutional:       Appearance: Normal appearance.   HENT:      Head: Normocephalic.   Cardiovascular:      Rate and Rhythm: Normal rate.      Pulses: Normal pulses.      Comments: +2 palpable bilateral dorsalis pedis and +2 palpable femoral pulses.  Pulmonary:      Effort: Pulmonary effort is normal.   Musculoskeletal:         General: Normal range of motion.      Cervical back: Normal range of motion.   Skin:     General: Skin is warm and dry.      Capillary Refill: Capillary refill takes less than 2 seconds.      Comments: Scattered varicosities and telangiectasia bilateral lower extremities.  No open areas or signs of infection.  Neurological:      General: No focal deficit present.      Mental Status: Alert and oriented to person, place, and time.   Psychiatric:         Mood and Affect: Mood normal.         Behavior: Behavior normal.    Imaging/Labs:  I have reviewed the notes from Dr. Howard on 4/19/2021 and 12/10/2020.      Assessment / Plan      Assessment / Plan:  Diagnoses and all orders for this visit:    1. Varicose veins of bilateral lower extremities with other complications (Primary)       Ms. Bone has bilateral lower extremity pain with the left greater than the right.  She has had a previous right great saphenous vein ablation with lower extremity stab phlebectomy performed by Dr. Howard at the beginning of 2021.  She does have torturous visible varicose veins and wears 20 to 30 mm knee-high compression stockings.  She has excellent palpable pulses however is having a arterial Doppler performed today at the hospital.  I have had a long discussion with Ms. Bone and the different options with her circumstances and we agreed that I will review the test being performed today and call her on Monday to advise accordingly.   I have also educated her on the possibility of conservative therapy for her venous insufficiency with vascular.  I have answered all of her questions and she is in agreement with the plan at this time.    Patient Education:      Follow Up:   No follow-ups on file.   Or sooner for any further concerns or worsening sign and symptoms. If unable to reach us in the office please dial 911 or go to the nearest emergency department.      DEANA Zamora  [facility] Vascular Surgery

## 2022-08-27 RX ORDER — ALBUTEROL SULFATE 90 UG/1
AEROSOL, METERED RESPIRATORY (INHALATION)
Qty: 8.5 G | Refills: 0 | Status: SHIPPED | OUTPATIENT
Start: 2022-08-27 | End: 2022-09-27

## 2022-08-30 ENCOUNTER — TELEPHONE (OUTPATIENT)
Dept: VASCULAR SURGERY | Facility: HOSPITAL | Age: 67
End: 2022-08-30

## 2022-08-30 DIAGNOSIS — I83.893 VARICOSE VEINS OF BILATERAL LOWER EXTREMITIES WITH OTHER COMPLICATIONS: Primary | ICD-10-CM

## 2022-08-30 DIAGNOSIS — I87.2 VENOUS (PERIPHERAL) INSUFFICIENCY: ICD-10-CM

## 2022-08-30 RX ORDER — DIOSMIN COMPLEX NO.1 630 MG
1 TABLET ORAL DAILY
Qty: 30 TABLET | Refills: 2 | Status: SHIPPED | OUTPATIENT
Start: 2022-08-30 | End: 2022-11-16 | Stop reason: SDUPTHER

## 2022-08-30 NOTE — TELEPHONE ENCOUNTER
I called Ms. Bone and explained her results of the arterial Doppler performed.  The arterial Doppler was normal and we discussed conservative therapy for her venous insufficiency.  She will continue wearing her knee-high compression stockings and will try Vasculera.  I will write the prescription and she will follow-up in approximately 2 months to reevaluate.  I have advised that should she have any side effects that she should stop the medication and call the office.  She voiced understanding and is in agreement with the plan.

## 2022-08-30 NOTE — TELEPHONE ENCOUNTER
TRIED CALLING PT TO GIVE HER DATE AND TIME TO SEE DR CONNER BUT HER MAILBOX WAS FULL. SHE IS ACTIVE ON MY CHART AND WILL GET THE APPT NOTIFICATION

## 2022-09-01 ENCOUNTER — OFFICE VISIT (OUTPATIENT)
Dept: FAMILY MEDICINE CLINIC | Facility: CLINIC | Age: 67
End: 2022-09-01

## 2022-09-01 VITALS
WEIGHT: 126 LBS | HEART RATE: 80 BPM | TEMPERATURE: 98.2 F | OXYGEN SATURATION: 98 % | BODY MASS INDEX: 21.97 KG/M2 | SYSTOLIC BLOOD PRESSURE: 122 MMHG | DIASTOLIC BLOOD PRESSURE: 70 MMHG

## 2022-09-01 DIAGNOSIS — E03.9 HYPOTHYROIDISM, UNSPECIFIED TYPE: ICD-10-CM

## 2022-09-01 DIAGNOSIS — M79.605 PAIN IN BOTH LOWER EXTREMITIES: Primary | ICD-10-CM

## 2022-09-01 DIAGNOSIS — E53.8 VITAMIN B12 DEFICIENCY: ICD-10-CM

## 2022-09-01 DIAGNOSIS — R74.8 ELEVATED CK: ICD-10-CM

## 2022-09-01 DIAGNOSIS — M79.604 PAIN IN BOTH LOWER EXTREMITIES: Primary | ICD-10-CM

## 2022-09-01 PROCEDURE — 99214 OFFICE O/P EST MOD 30 MIN: CPT | Performed by: FAMILY MEDICINE

## 2022-09-01 PROCEDURE — 82550 ASSAY OF CK (CPK): CPT | Performed by: FAMILY MEDICINE

## 2022-09-01 RX ORDER — CYANOCOBALAMIN 1000 UG/ML
1000 INJECTION, SOLUTION INTRAMUSCULAR; SUBCUTANEOUS
Qty: 3 ML | Refills: 3 | Status: SHIPPED | OUTPATIENT
Start: 2022-09-01

## 2022-09-01 RX ORDER — IBUPROFEN 800 MG/1
800 TABLET ORAL 2 TIMES DAILY PRN
Qty: 60 TABLET | Refills: 0 | Status: SHIPPED | OUTPATIENT
Start: 2022-09-01 | End: 2022-09-27

## 2022-09-01 RX ORDER — LEVOTHYROXINE SODIUM 88 UG/1
88 TABLET ORAL DAILY
Qty: 30 TABLET | Refills: 1 | Status: SHIPPED | OUTPATIENT
Start: 2022-09-01 | End: 2022-09-09 | Stop reason: SDUPTHER

## 2022-09-01 NOTE — PROGRESS NOTES
Chief Complaint  Hyperlipidemia and Hypothyroidism (Follow up on abnormal labs )    The 10-year ASCVD risk score (Jeff HARDEN Jr., et al., 2013) is: 5.1%    Values used to calculate the score:      Age: 67 years      Sex: Female      Is Non- : No      Diabetic: No      Tobacco smoker: No      Systolic Blood Pressure: 122 mmHg      Is BP treated: No      HDL Cholesterol: 60 mg/dL      Total Cholesterol: 212 mg/dL      Subjective          Nora Bone presents to [facility]  Discussed labs  Pt has elevated CK  Pt is hypothyroid  Pt has vitamin b12 deficiency  Pt has pain in both legs      Objective   Allergies   Allergen Reactions   • Hydrocodone-Acetaminophen Nausea And Vomiting     Also reports shaking        Immunization History   Administered Date(s) Administered   • Flu Vaccine Quad PF >36MO 02/07/2017, 09/27/2018, 10/15/2019, 01/12/2021   • FluLaval/Fluzone >6mos 10/28/2021   • Hepatitis A 03/13/2018, 09/13/2018   • Pneumococcal Polysaccharide (PPSV23) 10/28/2021   • Tdap 09/27/2018     Past Medical History:   Diagnosis Date   • Anemia    • Anxiety    • Asthma    • Back pain    • Cancer (HCC)    • Cervical cancer (HCC)    • COPD (chronic obstructive pulmonary disease) (HCC)    • Deep vein thrombosis (HCC)    • Disease of thyroid gland    • Diverticulosis    • Hearing loss    • Peripheral vascular disease (HCC)       Past Surgical History:   Procedure Laterality Date   • COLONOSCOPY W/ BIOPSIES     • DILATATION AND CURETTAGE     • VAGINAL HYSTERECTOMY     • VEIN SURGERY Right       Social History     Socioeconomic History   • Marital status: Single   Tobacco Use   • Smoking status: Former Smoker     Packs/day: 1.00     Years: 20.00     Pack years: 20.00     Types: Cigarettes   • Smokeless tobacco: Never Used   Vaping Use   • Vaping Use: Former   Substance and Sexual Activity   • Alcohol use: Never   • Drug use: Yes     Types: Marijuana   • Sexual activity:  Yes     Birth control/protection: Surgical        Current Outpatient Medications:   •  levothyroxine (SYNTHROID, LEVOTHROID) 88 MCG tablet, Take 1 tablet by mouth Daily., Disp: 30 tablet, Rfl: 1  •  albuterol sulfate  (90 Base) MCG/ACT inhaler, INHALE 2 PUFFS BY MOUTH FOUR TIMES DAILY AS NEEDED FOR WHEEZING, Disp: 8.5 g, Rfl: 0  •  aspirin 81 MG chewable tablet, Chew 81 mg Daily., Disp: , Rfl:   •  cholecalciferol (VITAMIN D3) 25 MCG (1000 UT) tablet, Take 1,000 Units by mouth Daily., Disp: , Rfl:   •  cyanocobalamin 1000 MCG/ML injection, Inject 1 mL into the appropriate muscle as directed by prescriber Every 28 (Twenty-Eight) Days., Disp: 3 mL, Rfl: 3  •  Dietary Management Product (Vasculera) tablet, Take 1 tablet by mouth Daily for 90 days., Disp: 30 tablet, Rfl: 2  •  fluticasone (FLONASE) 50 MCG/ACT nasal spray, USE 1 SPRAY INTO THE NOSTRIL AS DIRECTED DAILY, Disp: 48 g, Rfl: 1  •  Fluticasone Furoate-Vilanterol (Breo Ellipta) 200-25 MCG/INH inhaler, Inhale 1 puff Daily., Disp: 60 each, Rfl: 3  •  ibuprofen (ADVIL,MOTRIN) 800 MG tablet, Take 1 tablet by mouth 2 (Two) Times a Day As Needed for Mild Pain or Moderate Pain., Disp: 60 tablet, Rfl: 0   Family History   Problem Relation Age of Onset   • Ovarian cancer Mother    • Heart disease Mother    • Thyroid disease Mother    • Breast cancer Sister    • Breast cancer Paternal Aunt    • Epilepsy Father           Vital Signs:   Vitals:    09/01/22 1631   BP: 122/70   Pulse: 80   Temp: 98.2 °F (36.8 °C)   SpO2: 98%   Weight: 57.2 kg (126 lb)       Review of Systems   Constitutional: Positive for fatigue. Negative for fever.   HENT: Negative for sore throat.    Eyes: Negative for visual disturbance.   Respiratory: Negative for cough, chest tightness, shortness of breath and wheezing.    Cardiovascular: Negative for chest pain, palpitations and leg swelling.   Gastrointestinal: Negative for abdominal pain, diarrhea, nausea and vomiting.   Skin: Negative for  rash.   Neurological: Negative for light-headedness and headaches.   Psychiatric/Behavioral: Negative for decreased concentration, dysphoric mood and sleep disturbance. The patient is not nervous/anxious.       Physical Exam  Vitals reviewed.   Constitutional:       Appearance: Normal appearance. She is well-developed.   HENT:      Head: Normocephalic and atraumatic.      Right Ear: External ear normal.      Left Ear: External ear normal.      Mouth/Throat:      Pharynx: No oropharyngeal exudate.   Eyes:      Conjunctiva/sclera: Conjunctivae normal.      Pupils: Pupils are equal, round, and reactive to light.   Cardiovascular:      Rate and Rhythm: Normal rate and regular rhythm.      Pulses: Normal pulses.      Heart sounds: Normal heart sounds. No murmur heard.    No friction rub. No gallop.   Pulmonary:      Effort: Pulmonary effort is normal.      Breath sounds: Normal breath sounds. No wheezing or rhonchi.   Abdominal:      General: Abdomen is flat. Bowel sounds are normal. There is no distension.      Palpations: Abdomen is soft. There is no mass.      Tenderness: There is no abdominal tenderness. There is no guarding or rebound.      Hernia: No hernia is present.   Musculoskeletal:         General: Normal range of motion.   Skin:     General: Skin is warm and dry.      Capillary Refill: Capillary refill takes less than 2 seconds.   Neurological:      General: No focal deficit present.      Mental Status: She is alert and oriented to person, place, and time.      Cranial Nerves: No cranial nerve deficit.   Psychiatric:         Mood and Affect: Mood and affect normal.         Behavior: Behavior normal.         Thought Content: Thought content normal.         Judgment: Judgment normal.        Result Review :   The following data was reviewed by: Jarrod Simms MD on 09/01/2022:  CMP    CMP 10/28/21 8/18/22   Glucose 78 91   BUN 13 12   Creatinine 0.95 0.99   eGFR Non African Am 59 (A)    Sodium 138 140    Potassium 4.6 4.4   Chloride 103 102   Calcium 9.6 9.4   Albumin 5.00 4.30   Total Bilirubin 0.3 0.2   Alkaline Phosphatase 96 93   AST (SGOT) 28 28   ALT (SGPT) 20 20   (A) Abnormal value            CBC    CBC 10/28/21 8/18/22   WBC 12.52 (A) 9.70   RBC 5.46 (A) 5.09   Hemoglobin 16.5 (A) 14.7   Hematocrit 48.3 (A) 44.5   MCV 88.5 87.4   MCH 30.2 28.9   MCHC 34.2 33.0   RDW 14.1 13.0   Platelets 218 214   (A) Abnormal value            Lipid Panel    Lipid Panel 10/28/21 8/18/22   Total Cholesterol 244 (A) 212 (A)   Triglycerides 144 128   HDL Cholesterol 66 (A) 60   VLDL Cholesterol 26 23   LDL Cholesterol  152 (A) 129 (A)   LDL/HDL Ratio 2.26 2.11   (A) Abnormal value            TSH    TSH 10/28/21 8/18/22   TSH 2.890 9.730 (A)   (A) Abnormal value                      Assessment and Plan    Diagnoses and all orders for this visit:    1. Pain in both lower extremities (Primary)  -     Ambulatory Referral to Neurology  -     ibuprofen (ADVIL,MOTRIN) 800 MG tablet; Take 1 tablet by mouth 2 (Two) Times a Day As Needed for Mild Pain or Moderate Pain.  Dispense: 60 tablet; Refill: 0    2. Elevated CK  -     Ambulatory Referral to Neurology  -     CK    3. Hypothyroidism, unspecified type  -     levothyroxine (SYNTHROID, LEVOTHROID) 88 MCG tablet; Take 1 tablet by mouth Daily.  Dispense: 30 tablet; Refill: 1  -     TSH+Free T4; Future    4. Vitamin B12 deficiency  -     cyanocobalamin 1000 MCG/ML injection; Inject 1 mL into the appropriate muscle as directed by prescriber Every 28 (Twenty-Eight) Days.  Dispense: 3 mL; Refill: 3            Follow Up   Return in about 1 week (around 9/8/2022) for Medicare Wellness.  Patient was given instructions and counseling regarding her condition or for health maintenance advice. Please see specific information pulled into the AVS if appropriate.

## 2022-09-02 LAB — CK SERPL-CCNC: 347 U/L (ref 20–180)

## 2022-09-09 ENCOUNTER — OFFICE VISIT (OUTPATIENT)
Dept: FAMILY MEDICINE CLINIC | Facility: CLINIC | Age: 67
End: 2022-09-09

## 2022-09-09 VITALS
TEMPERATURE: 97.7 F | DIASTOLIC BLOOD PRESSURE: 86 MMHG | HEIGHT: 64 IN | HEART RATE: 74 BPM | SYSTOLIC BLOOD PRESSURE: 136 MMHG | WEIGHT: 128 LBS | OXYGEN SATURATION: 98 % | BODY MASS INDEX: 21.85 KG/M2

## 2022-09-09 DIAGNOSIS — J30.89 NON-SEASONAL ALLERGIC RHINITIS, UNSPECIFIED TRIGGER: ICD-10-CM

## 2022-09-09 DIAGNOSIS — Z87.891 PERSONAL HISTORY OF NICOTINE DEPENDENCE: ICD-10-CM

## 2022-09-09 DIAGNOSIS — R74.8 ELEVATED CK: ICD-10-CM

## 2022-09-09 DIAGNOSIS — H91.93 DECREASED HEARING OF BOTH EARS: ICD-10-CM

## 2022-09-09 DIAGNOSIS — Z00.00 MEDICARE WELCOME EXAM: Primary | ICD-10-CM

## 2022-09-09 DIAGNOSIS — E03.9 HYPOTHYROIDISM, UNSPECIFIED TYPE: ICD-10-CM

## 2022-09-09 DIAGNOSIS — Z72.0 TOBACCO ABUSE: ICD-10-CM

## 2022-09-09 DIAGNOSIS — Z78.0 POST-MENOPAUSAL: ICD-10-CM

## 2022-09-09 PROCEDURE — G0403 EKG FOR INITIAL PREVENT EXAM: HCPCS | Performed by: FAMILY MEDICINE

## 2022-09-09 PROCEDURE — 86803 HEPATITIS C AB TEST: CPT | Performed by: FAMILY MEDICINE

## 2022-09-09 PROCEDURE — G0402 INITIAL PREVENTIVE EXAM: HCPCS | Performed by: FAMILY MEDICINE

## 2022-09-09 PROCEDURE — 82550 ASSAY OF CK (CPK): CPT | Performed by: FAMILY MEDICINE

## 2022-09-09 PROCEDURE — 1159F MED LIST DOCD IN RCRD: CPT | Performed by: FAMILY MEDICINE

## 2022-09-09 PROCEDURE — 1170F FXNL STATUS ASSESSED: CPT | Performed by: FAMILY MEDICINE

## 2022-09-09 PROCEDURE — 36415 COLL VENOUS BLD VENIPUNCTURE: CPT | Performed by: FAMILY MEDICINE

## 2022-09-09 RX ORDER — LEVOTHYROXINE SODIUM 0.1 MG/1
100 TABLET ORAL DAILY
Qty: 30 TABLET | Refills: 1 | Status: SHIPPED | OUTPATIENT
Start: 2022-09-09 | End: 2022-10-25

## 2022-09-09 RX ORDER — FLUTICASONE PROPIONATE 50 MCG
1 SPRAY, SUSPENSION (ML) NASAL DAILY
Qty: 48 G | Refills: 5 | Status: SHIPPED | OUTPATIENT
Start: 2022-09-09

## 2022-09-09 NOTE — PROGRESS NOTES
The ABCs of the Annual Wellness Visit  Welcome to Medicare Visit    Chief Complaint   Patient presents with   • Medicare Wellness-Initial Visit     Subjective {   History of Present Illness:  Nora Bone is a 67 y.o. female who presents for a  Welcome to Medicare Visit.    The following portions of the patient's history were reviewed and   updated as appropriate:   She  has a past medical history of Anemia, Anxiety, Asthma, Back pain, Cancer (HCC), Cervical cancer (HCC), COPD (chronic obstructive pulmonary disease) (HCC), Deep vein thrombosis (HCC), Disease of thyroid gland, Diverticulosis, Hearing loss, and Peripheral vascular disease (HCC).  She does not have any pertinent problems on file.  She  has a past surgical history that includes Vein Surgery (Right); Total vaginal hysterectomy; Colonoscopy w/ biopsies; and Dilation and curettage of uterus.  Her family history includes Breast cancer in her paternal aunt and sister; Epilepsy in her father; Heart disease in her mother; Ovarian cancer in her mother; Thyroid disease in her mother.  She  reports that she quit smoking about 7 years ago. Her smoking use included cigarettes. She has a 20.00 pack-year smoking history. She has never used smokeless tobacco. She reports current drug use. Drug: Marijuana. She reports that she does not drink alcohol.  Current Outpatient Medications   Medication Sig Dispense Refill   • albuterol sulfate  (90 Base) MCG/ACT inhaler INHALE 2 PUFFS BY MOUTH FOUR TIMES DAILY AS NEEDED FOR WHEEZING 8.5 g 0   • aspirin 81 MG chewable tablet Chew 81 mg Daily.     • cholecalciferol (VITAMIN D3) 25 MCG (1000 UT) tablet Take 1,000 Units by mouth Daily.     • cyanocobalamin 1000 MCG/ML injection Inject 1 mL into the appropriate muscle as directed by prescriber Every 28 (Twenty-Eight) Days. 3 mL 3   • Dietary Management Product (Vasculera) tablet Take 1 tablet by mouth Daily for 90 days. 30 tablet 2   • fluticasone (FLONASE) 50 MCG/ACT nasal  spray 1 spray into the nostril(s) as directed by provider Daily. 48 g 5   • Fluticasone Furoate-Vilanterol (Breo Ellipta) 200-25 MCG/INH inhaler Inhale 1 puff Daily. 60 each 3   • ibuprofen (ADVIL,MOTRIN) 800 MG tablet Take 1 tablet by mouth 2 (Two) Times a Day As Needed for Mild Pain or Moderate Pain. 60 tablet 0   • levothyroxine (SYNTHROID, LEVOTHROID) 100 MCG tablet Take 1 tablet by mouth Daily. 30 tablet 1     No current facility-administered medications for this visit.     Current Outpatient Medications on File Prior to Visit   Medication Sig   • albuterol sulfate  (90 Base) MCG/ACT inhaler INHALE 2 PUFFS BY MOUTH FOUR TIMES DAILY AS NEEDED FOR WHEEZING   • aspirin 81 MG chewable tablet Chew 81 mg Daily.   • cholecalciferol (VITAMIN D3) 25 MCG (1000 UT) tablet Take 1,000 Units by mouth Daily.   • cyanocobalamin 1000 MCG/ML injection Inject 1 mL into the appropriate muscle as directed by prescriber Every 28 (Twenty-Eight) Days.   • Dietary Management Product (Vasculera) tablet Take 1 tablet by mouth Daily for 90 days.   • Fluticasone Furoate-Vilanterol (Breo Ellipta) 200-25 MCG/INH inhaler Inhale 1 puff Daily.   • ibuprofen (ADVIL,MOTRIN) 800 MG tablet Take 1 tablet by mouth 2 (Two) Times a Day As Needed for Mild Pain or Moderate Pain.   • [DISCONTINUED] fluticasone (FLONASE) 50 MCG/ACT nasal spray USE 1 SPRAY INTO THE NOSTRIL AS DIRECTED DAILY   • [DISCONTINUED] levothyroxine (SYNTHROID, LEVOTHROID) 88 MCG tablet Take 1 tablet by mouth Daily.     No current facility-administered medications on file prior to visit.     She is allergic to hydrocodone-acetaminophen..     Compared to one year ago, the patient feels her physical   health is better.    Compared to one year ago, the patient feels her mental   health is better.    Recent Hospitalizations:  She was not admitted to the hospital during the last year.       Current Medical Providers:  Patient Care Team:  Jarrod Simms MD as PCP - General  (Family Medicine)    Outpatient Medications Prior to Visit   Medication Sig Dispense Refill   • albuterol sulfate  (90 Base) MCG/ACT inhaler INHALE 2 PUFFS BY MOUTH FOUR TIMES DAILY AS NEEDED FOR WHEEZING 8.5 g 0   • aspirin 81 MG chewable tablet Chew 81 mg Daily.     • cholecalciferol (VITAMIN D3) 25 MCG (1000 UT) tablet Take 1,000 Units by mouth Daily.     • cyanocobalamin 1000 MCG/ML injection Inject 1 mL into the appropriate muscle as directed by prescriber Every 28 (Twenty-Eight) Days. 3 mL 3   • Dietary Management Product (Vasculera) tablet Take 1 tablet by mouth Daily for 90 days. 30 tablet 2   • Fluticasone Furoate-Vilanterol (Breo Ellipta) 200-25 MCG/INH inhaler Inhale 1 puff Daily. 60 each 3   • ibuprofen (ADVIL,MOTRIN) 800 MG tablet Take 1 tablet by mouth 2 (Two) Times a Day As Needed for Mild Pain or Moderate Pain. 60 tablet 0   • fluticasone (FLONASE) 50 MCG/ACT nasal spray USE 1 SPRAY INTO THE NOSTRIL AS DIRECTED DAILY 48 g 1   • levothyroxine (SYNTHROID, LEVOTHROID) 88 MCG tablet Take 1 tablet by mouth Daily. 30 tablet 1     No facility-administered medications prior to visit.       No opioid medication identified on active medication list. I have reviewed chart for other potential  high risk medication/s and harmful drug interactions in the elderly.          Aspirin is on active medication list. Aspirin use is indicated based on review of current medical condition/s. Pros and cons of this therapy have been discussed today. Benefits of this medication outweigh potential harm.  Patient has been encouraged to continue taking this medication.  .      Patient Active Problem List   Diagnosis   • Anemia   • Anxiety   • Arthritis   • Asthma   • Cancer (HCC)   • Family history of malignant neoplasm of digestive organs   • GERD (gastroesophageal reflux disease)   • Hypothyroidism   • Migraines   • Rheumatic fever   • Seasonal allergic rhinitis   • Skin lesion   • Varicose veins of bilateral lower  "extremities with pain   • Venous insufficiency (chronic) (peripheral)     Advance Care Planning  Advance Directive is not on file.  ACP discussion was held with the patient during this visit. Patient has an advance directive (not in EMR), copy requested.    Review of Systems   Constitutional: Negative for fatigue and fever.   HENT: Negative for sore throat.    Eyes: Negative for visual disturbance.   Respiratory: Negative for cough, chest tightness, shortness of breath and wheezing.    Cardiovascular: Negative for chest pain, palpitations and leg swelling.   Gastrointestinal: Negative for abdominal pain, diarrhea, nausea and vomiting.   Skin: Negative for rash.   Neurological: Negative for light-headedness.   Psychiatric/Behavioral: Negative for decreased concentration, dysphoric mood, sleep disturbance and suicidal ideas. The patient is not nervous/anxious.         Objective      Vitals:    09/09/22 1007   BP: 136/86   Pulse: 74   Temp: 97.7 °F (36.5 °C)   SpO2: 98%   Weight: 58.1 kg (128 lb)   Height: 161.3 cm (63.5\")     Estimated body mass index is 22.32 kg/m² as calculated from the following:    Height as of this encounter: 161.3 cm (63.5\").    Weight as of this encounter: 58.1 kg (128 lb).    BMI is within normal parameters. No other follow-up for BMI required.      Does the patient have evidence of cognitive impairment? No    Physical Exam  Vitals reviewed.   Constitutional:       Appearance: Normal appearance. She is well-developed.   HENT:      Head: Normocephalic and atraumatic.      Right Ear: Tympanic membrane, ear canal and external ear normal.      Left Ear: Tympanic membrane, ear canal and external ear normal.      Nose: Nose normal.      Mouth/Throat:      Mouth: Mucous membranes are moist.      Pharynx: Oropharynx is clear. No oropharyngeal exudate or posterior oropharyngeal erythema.   Eyes:      Conjunctiva/sclera: Conjunctivae normal.      Pupils: Pupils are equal, round, and reactive to light. "   Cardiovascular:      Rate and Rhythm: Normal rate and regular rhythm.      Pulses: Normal pulses.      Heart sounds: Normal heart sounds. No murmur heard.    No friction rub. No gallop.   Pulmonary:      Effort: Pulmonary effort is normal.      Breath sounds: Normal breath sounds. No wheezing or rhonchi.   Abdominal:      General: Abdomen is flat. Bowel sounds are normal. There is no distension.      Palpations: Abdomen is soft. There is no mass.      Tenderness: There is no abdominal tenderness. There is no guarding or rebound.      Hernia: No hernia is present.   Musculoskeletal:         General: Normal range of motion.   Skin:     General: Skin is warm and dry.      Capillary Refill: Capillary refill takes less than 2 seconds.   Neurological:      General: No focal deficit present.      Mental Status: She is alert and oriented to person, place, and time.      Cranial Nerves: No cranial nerve deficit.   Psychiatric:         Mood and Affect: Mood and affect normal.         Behavior: Behavior normal.         Thought Content: Thought content normal.         Judgment: Judgment normal.         Lab Results   Component Value Date    TRIG 128 2022    HDL 60 2022     (H) 2022    VLDL 23 2022         ECG 12 Lead    Date/Time: 2022 10:55 AM  Performed by: Jarrod Simms MD  Authorized by: Jarrod Simms MD   Comparison: not compared with previous ECG   Previous ECG: no previous ECG available  Rhythm: sinus rhythm  Rate: normal  Conduction: conduction normal  ST Segments: ST segments normal  T Waves: T waves normal  QRS axis: normal  Other: no other findings  Lead: right-sided leads used    Clinical impression: normal ECG               HEALTH RISK ASSESSMENT    Smoking Status:  Social History     Tobacco Use   Smoking Status Former Smoker   • Packs/day: 1.00   • Years: 20.00   • Pack years: 20.00   • Types: Cigarettes   • Quit date: 2015   • Years since quittin.6    Smokeless Tobacco Never Used     Alcohol Consumption:  Social History     Substance and Sexual Activity   Alcohol Use Never       Fall Risk Screen:    ALFONSOADI Fall Risk Assessment was completed, and patient is at HIGH risk for falls. Assessment completed on:9/9/2022    Depression Screen:   PHQ-2/PHQ-9 Depression Screening 9/9/2022   Retired PHQ-9 Total Score -   Retired Total Score -   Little Interest or Pleasure in Doing Things 0-->not at all   Feeling Down, Depressed or Hopeless 0-->not at all   PHQ-9: Brief Depression Severity Measure Score 0       Health Habits and Functional and Cognitive Screening:  Functional & Cognitive Status 9/9/2022   Do you have difficulty preparing food and eating? No   Do you have difficulty bathing yourself, getting dressed or grooming yourself? No   Do you have difficulty using the toilet? No   Do you have difficulty moving around from place to place? No   Do you have trouble with steps or getting out of a bed or a chair? No   Current Diet Well Balanced Diet   Dental Exam Not up to date   Eye Exam Not up to date   Exercise (times per week) 2 times per week   Current Exercises Include Stationary Bicycling/Spin Class;Weightlifting   Do you need help using the phone?  No   Are you deaf or do you have serious difficulty hearing?  Yes   Do you need help with transportation? No   Do you need help shopping? No   Do you need help preparing meals?  No   Do you need help with housework?  No   Do you need help with laundry? No   Do you need help taking your medications? No   Do you need help managing money? No   Do you ever drive or ride in a car without wearing a seat belt? No   Have you felt unusual stress, anger or loneliness in the last month? Yes   Who do you live with? Other   If you need help, do you have trouble finding someone available to you? No   Have you been bothered in the last four weeks by sexual problems? No   Do you have difficulty concentrating, remembering or making  decisions? No       Visual Acuity:    No exam data present    Age-appropriate Screening Schedule:  Refer to the list below for future screening recommendations based on patient's age, sex and/or medical conditions. Orders for these recommended tests are listed in the plan section. The patient has been provided with a written plan.    Health Maintenance   Topic Date Due   • DXA SCAN  Never done   • ZOSTER VACCINE (1 of 2) Never done   • INFLUENZA VACCINE  10/01/2022   • LIPID PANEL  08/18/2023   • MAMMOGRAM  11/08/2023   • TDAP/TD VACCINES (2 - Td or Tdap) 09/27/2028          Assessment & Plan   CMS Preventative Services Quick Reference  Risk Factors Identified During Encounter  Hearing Problem  Immunizations Discussed/Encouraged (specific Immunizations; Shingrix  The above risks/problems have been discussed with the patient.  Pertinent information has been shared with the patient in the After Visit Summary.  Follow up plans and orders are seen below in the Assessment/Plan Section.    Diagnoses and all orders for this visit:    1. Medicare welcome exam (Primary)  -     ECG 12 Lead  -     Hepatitis C Antibody    2. Post-menopausal  -     DEXA Bone Density Axial    3. Non-seasonal allergic rhinitis, unspecified trigger  -     fluticasone (FLONASE) 50 MCG/ACT nasal spray; 1 spray into the nostril(s) as directed by provider Daily.  Dispense: 48 g; Refill: 5    4. Hypothyroidism, unspecified type  -     levothyroxine (SYNTHROID, LEVOTHROID) 100 MCG tablet; Take 1 tablet by mouth Daily.  Dispense: 30 tablet; Refill: 1  -     TSH+Free T4; Future    5. Elevated CK  -     CK    6. Tobacco abuse  -      CT Chest Low Dose Cancer Screening WO; Future    7. Personal history of nicotine dependence   -      CT Chest Low Dose Cancer Screening WO; Future    8. Decreased hearing of both ears  -     Cancel: Ambulatory Referral to ENT (Otolaryngology)  -     Ambulatory Referral to ENT (Otolaryngology)        Follow Up:   Return if  symptoms worsen or fail to improve.     An After Visit Summary and PPPS were made available to the patient.

## 2022-09-09 NOTE — PROGRESS NOTES
Venipuncture Blood Specimen Collection  Venipuncture performed in left arm by Raquel Petersen with good hemostasis. Patient tolerated the procedure well without complications.   09/09/22   Raquel Petersen

## 2022-09-10 LAB
CK SERPL-CCNC: 362 U/L (ref 20–180)
HCV AB SER DONR QL: REACTIVE

## 2022-09-15 ENCOUNTER — TRANSCRIBE ORDERS (OUTPATIENT)
Dept: ADMINISTRATIVE | Facility: HOSPITAL | Age: 67
End: 2022-09-15

## 2022-09-15 DIAGNOSIS — Z12.31 SCREENING MAMMOGRAM, ENCOUNTER FOR: Primary | ICD-10-CM

## 2022-09-19 ENCOUNTER — OFFICE VISIT (OUTPATIENT)
Dept: FAMILY MEDICINE CLINIC | Facility: CLINIC | Age: 67
End: 2022-09-19

## 2022-09-19 VITALS
BODY MASS INDEX: 22.49 KG/M2 | TEMPERATURE: 98.2 F | HEART RATE: 76 BPM | DIASTOLIC BLOOD PRESSURE: 82 MMHG | WEIGHT: 129 LBS | SYSTOLIC BLOOD PRESSURE: 135 MMHG | OXYGEN SATURATION: 99 %

## 2022-09-19 DIAGNOSIS — R76.8 HEPATITIS C ANTIBODY POSITIVE IN BLOOD: Primary | ICD-10-CM

## 2022-09-19 DIAGNOSIS — E03.9 HYPOTHYROIDISM, UNSPECIFIED TYPE: ICD-10-CM

## 2022-09-19 PROCEDURE — 99213 OFFICE O/P EST LOW 20 MIN: CPT | Performed by: FAMILY MEDICINE

## 2022-09-19 PROCEDURE — 87522 HEPATITIS C REVRS TRNSCRPJ: CPT | Performed by: FAMILY MEDICINE

## 2022-09-19 PROCEDURE — 36415 COLL VENOUS BLD VENIPUNCTURE: CPT | Performed by: FAMILY MEDICINE

## 2022-09-19 NOTE — PROGRESS NOTES
Chief Complaint  Hepatitis C (Follow up on abnormal labs )    Subjective          Nora Bone presents to [facility]  History of Present Illness    Objective   Allergies   Allergen Reactions   • Hydrocodone-Acetaminophen Nausea And Vomiting     Also reports shaking        Immunization History   Administered Date(s) Administered   • COVID-19 (MODERNA) 1st, 2nd, 3rd Dose Only 2021, 2021   • Flu Vaccine Quad PF >36MO 2017, 2018, 10/15/2019, 2021   • FluLaval/Fluzone >6mos 10/28/2021   • Hepatitis A 2018, 2018   • Pneumococcal Polysaccharide (PPSV23) 10/28/2021   • Tdap 2018     Past Medical History:   Diagnosis Date   • Anemia    • Anxiety    • Asthma    • Back pain    • Cancer (HCC)    • Cervical cancer (HCC)    • COPD (chronic obstructive pulmonary disease) (HCC)    • Deep vein thrombosis (HCC)    • Disease of thyroid gland    • Diverticulosis    • Hearing loss    • Peripheral vascular disease (HCC)       Past Surgical History:   Procedure Laterality Date   • COLONOSCOPY W/ BIOPSIES     • DILATATION AND CURETTAGE     • VAGINAL HYSTERECTOMY     • VEIN SURGERY Right       Social History     Socioeconomic History   • Marital status: Single   Tobacco Use   • Smoking status: Former Smoker     Packs/day: 1.00     Years: 20.00     Pack years: 20.00     Types: Cigarettes     Quit date: 2015     Years since quittin.7   • Smokeless tobacco: Never Used   Vaping Use   • Vaping Use: Former   Substance and Sexual Activity   • Alcohol use: Never   • Drug use: Yes     Types: Marijuana   • Sexual activity: Yes     Birth control/protection: Surgical        Current Outpatient Medications:   •  albuterol sulfate  (90 Base) MCG/ACT inhaler, INHALE 2 PUFFS BY MOUTH FOUR TIMES DAILY AS NEEDED FOR WHEEZING, Disp: 8.5 g, Rfl: 0  •  aspirin 81 MG chewable tablet, Chew 81 mg Daily., Disp: , Rfl:   •  cholecalciferol (VITAMIN D3) 25 MCG (1000 UT)  tablet, Take 1,000 Units by mouth Daily., Disp: , Rfl:   •  cyanocobalamin 1000 MCG/ML injection, Inject 1 mL into the appropriate muscle as directed by prescriber Every 28 (Twenty-Eight) Days., Disp: 3 mL, Rfl: 3  •  Dietary Management Product (Vasculera) tablet, Take 1 tablet by mouth Daily for 90 days., Disp: 30 tablet, Rfl: 2  •  fluticasone (FLONASE) 50 MCG/ACT nasal spray, 1 spray into the nostril(s) as directed by provider Daily., Disp: 48 g, Rfl: 5  •  Fluticasone Furoate-Vilanterol (Breo Ellipta) 200-25 MCG/INH inhaler, Inhale 1 puff Daily., Disp: 60 each, Rfl: 3  •  ibuprofen (ADVIL,MOTRIN) 800 MG tablet, Take 1 tablet by mouth 2 (Two) Times a Day As Needed for Mild Pain or Moderate Pain., Disp: 60 tablet, Rfl: 0  •  levothyroxine (SYNTHROID, LEVOTHROID) 100 MCG tablet, Take 1 tablet by mouth Daily., Disp: 30 tablet, Rfl: 1   Family History   Problem Relation Age of Onset   • Ovarian cancer Mother    • Heart disease Mother    • Thyroid disease Mother    • Breast cancer Sister    • Breast cancer Paternal Aunt    • Epilepsy Father           Vital Signs:   Vitals:    09/19/22 1107 09/19/22 1128   BP: 180/80 135/82   Pulse: 76    Temp: 98.2 °F (36.8 °C)    SpO2: 99%    Weight: 58.5 kg (129 lb)        Review of Systems   Physical Exam  Vitals reviewed.   Constitutional:       Appearance: Normal appearance. She is well-developed.   HENT:      Head: Normocephalic and atraumatic.      Right Ear: External ear normal.      Left Ear: External ear normal.      Mouth/Throat:      Pharynx: No oropharyngeal exudate.   Eyes:      Conjunctiva/sclera: Conjunctivae normal.      Pupils: Pupils are equal, round, and reactive to light.   Cardiovascular:      Rate and Rhythm: Normal rate and regular rhythm.      Pulses: Normal pulses.      Heart sounds: Normal heart sounds. No murmur heard.    No friction rub. No gallop.   Pulmonary:      Effort: Pulmonary effort is normal.      Breath sounds: Normal breath sounds. No wheezing  or rhonchi.   Abdominal:      General: Abdomen is flat. Bowel sounds are normal. There is no distension.      Palpations: Abdomen is soft. There is no mass.      Tenderness: There is no abdominal tenderness. There is no guarding or rebound.      Hernia: No hernia is present.   Skin:     General: Skin is warm and dry.      Capillary Refill: Capillary refill takes less than 2 seconds.   Neurological:      General: No focal deficit present.      Mental Status: She is alert and oriented to person, place, and time.      Cranial Nerves: No cranial nerve deficit.   Psychiatric:         Mood and Affect: Mood and affect normal.         Behavior: Behavior normal.         Thought Content: Thought content normal.         Judgment: Judgment normal.        Result Review :   The following data was reviewed by: Jarrod Simms MD on 09/19/2022:  CMP    CMP 10/28/21 8/18/22   Glucose 78 91   BUN 13 12   Creatinine 0.95 0.99   eGFR Non African Am 59 (A)    Sodium 138 140   Potassium 4.6 4.4   Chloride 103 102   Calcium 9.6 9.4   Albumin 5.00 4.30   Total Bilirubin 0.3 0.2   Alkaline Phosphatase 96 93   AST (SGOT) 28 28   ALT (SGPT) 20 20   (A) Abnormal value            CBC    CBC 10/28/21 8/18/22   WBC 12.52 (A) 9.70   RBC 5.46 (A) 5.09   Hemoglobin 16.5 (A) 14.7   Hematocrit 48.3 (A) 44.5   MCV 88.5 87.4   MCH 30.2 28.9   MCHC 34.2 33.0   RDW 14.1 13.0   Platelets 218 214   (A) Abnormal value                      Assessment and Plan    Diagnoses and all orders for this visit:    1. Hepatitis C antibody positive in blood (Primary)  -     Hepatitis C RNA, Quantitative, PCR (graph)  -     Ambulatory Referral to Gastroenterology            Follow Up   Return if symptoms worsen or fail to improve.  Patient was given instructions and counseling regarding her condition or for health maintenance advice. Please see specific information pulled into the AVS if appropriate.

## 2022-09-19 NOTE — PROGRESS NOTES
Venipuncture Blood Specimen Collection  Venipuncture performed in lefty arm  by Fior Goldberg with good hemostasis. Patient tolerated the procedure well without complications.   09/19/22   Fior Goldberg

## 2022-09-21 ENCOUNTER — TELEPHONE (OUTPATIENT)
Dept: FAMILY MEDICINE CLINIC | Facility: CLINIC | Age: 67
End: 2022-09-21

## 2022-09-21 LAB
HCV RNA SERPL NAA+PROBE-ACNC: NORMAL IU/ML
TEST INFORMATION: NORMAL

## 2022-09-26 DIAGNOSIS — M79.604 PAIN IN BOTH LOWER EXTREMITIES: ICD-10-CM

## 2022-09-26 DIAGNOSIS — M79.605 PAIN IN BOTH LOWER EXTREMITIES: ICD-10-CM

## 2022-09-27 RX ORDER — ALBUTEROL SULFATE 90 UG/1
AEROSOL, METERED RESPIRATORY (INHALATION)
Qty: 8.5 G | Refills: 0 | Status: SHIPPED | OUTPATIENT
Start: 2022-09-27 | End: 2022-10-25

## 2022-09-27 RX ORDER — IBUPROFEN 800 MG/1
TABLET ORAL
Qty: 60 TABLET | Refills: 0 | Status: SHIPPED | OUTPATIENT
Start: 2022-09-27 | End: 2022-10-25

## 2022-10-24 DIAGNOSIS — E03.9 HYPOTHYROIDISM, UNSPECIFIED TYPE: ICD-10-CM

## 2022-10-24 RX ORDER — LEVOTHYROXINE SODIUM 0.1 MG/1
TABLET ORAL
Qty: 30 TABLET | Refills: 1 | OUTPATIENT
Start: 2022-10-24

## 2022-10-25 DIAGNOSIS — E03.9 HYPOTHYROIDISM, UNSPECIFIED TYPE: ICD-10-CM

## 2022-10-25 DIAGNOSIS — M79.605 PAIN IN BOTH LOWER EXTREMITIES: ICD-10-CM

## 2022-10-25 DIAGNOSIS — M79.604 PAIN IN BOTH LOWER EXTREMITIES: ICD-10-CM

## 2022-10-25 RX ORDER — ALBUTEROL SULFATE 90 UG/1
AEROSOL, METERED RESPIRATORY (INHALATION)
Qty: 8.5 G | Refills: 0 | Status: SHIPPED | OUTPATIENT
Start: 2022-10-25 | End: 2022-11-28

## 2022-10-25 RX ORDER — IBUPROFEN 800 MG/1
TABLET ORAL
Qty: 60 TABLET | Refills: 0 | Status: SHIPPED | OUTPATIENT
Start: 2022-10-25 | End: 2022-11-28

## 2022-10-25 RX ORDER — LEVOTHYROXINE SODIUM 0.1 MG/1
TABLET ORAL
Qty: 30 TABLET | Refills: 0 | Status: SHIPPED | OUTPATIENT
Start: 2022-10-25 | End: 2022-11-28

## 2022-10-31 ENCOUNTER — OFFICE VISIT (OUTPATIENT)
Dept: VASCULAR SURGERY | Facility: HOSPITAL | Age: 67
End: 2022-10-31

## 2022-10-31 VITALS
DIASTOLIC BLOOD PRESSURE: 70 MMHG | OXYGEN SATURATION: 97 % | TEMPERATURE: 98.2 F | SYSTOLIC BLOOD PRESSURE: 142 MMHG | HEART RATE: 59 BPM | RESPIRATION RATE: 16 BRPM

## 2022-10-31 DIAGNOSIS — J41.0 SIMPLE CHRONIC BRONCHITIS: ICD-10-CM

## 2022-10-31 DIAGNOSIS — I83.893 VARICOSE VEINS OF BILATERAL LOWER EXTREMITIES WITH OTHER COMPLICATIONS: Primary | ICD-10-CM

## 2022-10-31 PROCEDURE — 99212 OFFICE O/P EST SF 10 MIN: CPT | Performed by: SURGERY

## 2022-10-31 PROCEDURE — G0463 HOSPITAL OUTPT CLINIC VISIT: HCPCS

## 2022-10-31 PROCEDURE — G0463 HOSPITAL OUTPT CLINIC VISIT: HCPCS | Performed by: SURGERY

## 2022-10-31 RX ORDER — BUDESONIDE AND FORMOTEROL FUMARATE DIHYDRATE 160; 4.5 UG/1; UG/1
AEROSOL RESPIRATORY (INHALATION)
Qty: 30.6 G | Refills: 1 | OUTPATIENT
Start: 2022-10-31

## 2022-10-31 NOTE — PROGRESS NOTES
[facility]   Follow up Office    Patient Name: Nora Bone  : 1955  MRN: [id number]  Primary Care Physician:  Jarrod Simms MD      Subjective   Subjective     HPI:    Nora Bone is a 67 y.o. female here for follow-up after a trial of Vasculera.  She says that she is doing much better.  Her symptoms are markedly improved in both legs.  Some of the veins have significantly decreased in size.      Objective     Vitals:   Temp:  [98.2 °F (36.8 °C)] 98.2 °F (36.8 °C)  Heart Rate:  [59] 59  Resp:  [16] 16  BP: (142)/(70) 142/70    Physical Exam      General: Alert, no acute distress.  Extremities: Symmetric.  Some visible veins in both lower extremities without significant enlargement or swelling.    Assessment & Plan   Assessment / Plan     Diagnoses and all orders for this visit:    1. Varicose veins of bilateral lower extremities with other complications (Primary)       Assessment/Plan:   Satisfactory response to medical management of varicose veins.  Recommend continuation of medical therapy.  Follow-up in 1 year.        Electronically signed by Philipp Dunham MD, 10/31/22, 9:15 AM EDT.

## 2022-11-04 ENCOUNTER — CLINICAL SUPPORT (OUTPATIENT)
Dept: FAMILY MEDICINE CLINIC | Facility: CLINIC | Age: 67
End: 2022-11-04

## 2022-11-04 DIAGNOSIS — Z23 NEED FOR INFLUENZA VACCINATION: Primary | ICD-10-CM

## 2022-11-04 DIAGNOSIS — E53.8 B12 DEFICIENCY: ICD-10-CM

## 2022-11-04 PROCEDURE — G0008 ADMIN INFLUENZA VIRUS VAC: HCPCS | Performed by: FAMILY MEDICINE

## 2022-11-04 PROCEDURE — 90662 IIV NO PRSV INCREASED AG IM: CPT | Performed by: FAMILY MEDICINE

## 2022-11-04 PROCEDURE — 96372 THER/PROPH/DIAG INJ SC/IM: CPT | Performed by: FAMILY MEDICINE

## 2022-11-04 RX ORDER — CYANOCOBALAMIN 1000 UG/ML
1000 INJECTION, SOLUTION INTRAMUSCULAR; SUBCUTANEOUS
Status: DISCONTINUED | OUTPATIENT
Start: 2022-11-04 | End: 2023-01-13

## 2022-11-04 RX ADMIN — CYANOCOBALAMIN 1000 MCG: 1000 INJECTION, SOLUTION INTRAMUSCULAR; SUBCUTANEOUS at 12:23

## 2022-11-14 ENCOUNTER — TRANSCRIBE ORDERS (OUTPATIENT)
Dept: ADMINISTRATIVE | Facility: HOSPITAL | Age: 67
End: 2022-11-14

## 2022-11-14 DIAGNOSIS — Z12.31 ENCOUNTER FOR SCREENING MAMMOGRAM FOR MALIGNANT NEOPLASM OF BREAST: Primary | ICD-10-CM

## 2022-11-15 ENCOUNTER — HOSPITAL ENCOUNTER (OUTPATIENT)
Dept: MAMMOGRAPHY | Facility: HOSPITAL | Age: 67
Discharge: HOME | End: 2022-11-15

## 2022-11-15 ENCOUNTER — APPOINTMENT (OUTPATIENT)
Dept: MAMMOGRAPHY | Facility: HOSPITAL | Age: 67
End: 2022-11-15

## 2022-11-15 ENCOUNTER — HOSPITAL ENCOUNTER (OUTPATIENT)
Dept: CT IMAGING | Facility: HOSPITAL | Age: 67
Discharge: HOME | End: 2022-11-15

## 2022-11-15 ENCOUNTER — HOSPITAL ENCOUNTER (OUTPATIENT)
Dept: BONE DENSITY | Facility: HOSPITAL | Age: 67
Discharge: HOME | End: 2022-11-15

## 2022-11-15 DIAGNOSIS — Z87.891 PERSONAL HISTORY OF NICOTINE DEPENDENCE: ICD-10-CM

## 2022-11-15 DIAGNOSIS — Z72.0 TOBACCO ABUSE: ICD-10-CM

## 2022-11-15 DIAGNOSIS — Z12.31 ENCOUNTER FOR SCREENING MAMMOGRAM FOR MALIGNANT NEOPLASM OF BREAST: ICD-10-CM

## 2022-11-15 DIAGNOSIS — Z72.0 TOBACCO ABUSE: Primary | ICD-10-CM

## 2022-11-15 PROCEDURE — 77080 DXA BONE DENSITY AXIAL: CPT

## 2022-11-15 PROCEDURE — 77067 SCR MAMMO BI INCL CAD: CPT

## 2022-11-15 PROCEDURE — 77063 BREAST TOMOSYNTHESIS BI: CPT

## 2022-11-15 PROCEDURE — 71271 CT THORAX LUNG CANCER SCR C-: CPT

## 2022-11-16 ENCOUNTER — TELEPHONE (OUTPATIENT)
Dept: VASCULAR SURGERY | Facility: HOSPITAL | Age: 67
End: 2022-11-16

## 2022-11-16 DIAGNOSIS — I87.2 VENOUS (PERIPHERAL) INSUFFICIENCY: ICD-10-CM

## 2022-11-16 DIAGNOSIS — I83.893 VARICOSE VEINS OF BILATERAL LOWER EXTREMITIES WITH OTHER COMPLICATIONS: ICD-10-CM

## 2022-11-16 RX ORDER — DIOSMIN COMPLEX NO.1 630 MG
1 TABLET ORAL DAILY
Qty: 30 TABLET | Refills: 11 | Status: SHIPPED | OUTPATIENT
Start: 2022-11-16 | End: 2023-11-16

## 2022-11-16 NOTE — TELEPHONE ENCOUNTER
CALLED PT BACK AND INFORMED HER THAT [facility] HAD PUT NEW SCRIPT IN FOR HER AND THEY WOULD BE CONTACTING HER ABOUT IT.

## 2022-11-16 NOTE — TELEPHONE ENCOUNTER
Caller: Nora Bone    Relationship: Self    Best call back number: [phone]    Requested Prescriptions:   Requested Prescriptions      No prescriptions requested or ordered in this encounter    VASCULERA [id number]    Pharmacy where request should be sent:    [facility]    Does the patient have less than a 3 day supply:  [] Yes  [x] No    Lin Brito Rep   11/16/22 12:13 EST

## 2022-11-18 RX ORDER — SYRINGE WITH NEEDLE, 1 ML 25GX5/8"
SYRINGE, EMPTY DISPOSABLE MISCELLANEOUS
Qty: 3 EACH | Refills: 0 | Status: SHIPPED | OUTPATIENT
Start: 2022-11-18

## 2022-11-25 DIAGNOSIS — M79.605 PAIN IN BOTH LOWER EXTREMITIES: ICD-10-CM

## 2022-11-25 DIAGNOSIS — M79.604 PAIN IN BOTH LOWER EXTREMITIES: ICD-10-CM

## 2022-11-25 DIAGNOSIS — E03.9 HYPOTHYROIDISM, UNSPECIFIED TYPE: ICD-10-CM

## 2022-11-28 RX ORDER — IBUPROFEN 800 MG/1
TABLET ORAL
Qty: 60 TABLET | Refills: 0 | Status: SHIPPED | OUTPATIENT
Start: 2022-11-28 | End: 2023-03-30

## 2022-11-28 RX ORDER — ALBUTEROL SULFATE 90 UG/1
AEROSOL, METERED RESPIRATORY (INHALATION)
Qty: 8.5 G | Refills: 0 | Status: SHIPPED | OUTPATIENT
Start: 2022-11-28

## 2022-11-28 RX ORDER — LEVOTHYROXINE SODIUM 0.1 MG/1
TABLET ORAL
Qty: 30 TABLET | Refills: 0 | Status: SHIPPED | OUTPATIENT
Start: 2022-11-28 | End: 2023-01-05 | Stop reason: SDUPTHER

## 2022-12-08 ENCOUNTER — LAB (OUTPATIENT)
Dept: LAB | Facility: HOSPITAL | Age: 67
End: 2022-12-08

## 2022-12-08 ENCOUNTER — OFFICE VISIT (OUTPATIENT)
Dept: NEUROLOGY | Facility: CLINIC | Age: 67
End: 2022-12-08

## 2022-12-08 VITALS
HEART RATE: 56 BPM | BODY MASS INDEX: 22.13 KG/M2 | WEIGHT: 129.6 LBS | DIASTOLIC BLOOD PRESSURE: 84 MMHG | SYSTOLIC BLOOD PRESSURE: 173 MMHG | HEIGHT: 64 IN

## 2022-12-08 DIAGNOSIS — R74.8 ELEVATED CK: Primary | ICD-10-CM

## 2022-12-08 DIAGNOSIS — R74.8 ELEVATED CK: ICD-10-CM

## 2022-12-08 DIAGNOSIS — E03.9 HYPOTHYROIDISM, UNSPECIFIED TYPE: ICD-10-CM

## 2022-12-08 DIAGNOSIS — R53.1 WEAKNESS: ICD-10-CM

## 2022-12-08 LAB
CERULOPLASMIN SERPL-MCNC: 28 MG/DL (ref 19–39)
CK MB SERPL-CCNC: 13.4 NG/ML
CRP SERPL-MCNC: 0.63 MG/DL (ref 0–0.5)
ERYTHROCYTE [SEDIMENTATION RATE] IN BLOOD: 5 MM/HR (ref 0–30)
FOLATE SERPL-MCNC: 7.41 NG/ML (ref 4.78–24.2)
T4 FREE SERPL-MCNC: 1.54 NG/DL (ref 0.93–1.7)
TSH SERPL DL<=0.05 MIU/L-ACNC: 0.44 UIU/ML (ref 0.27–4.2)
VIT B12 BLD-MCNC: 392 PG/ML (ref 211–946)

## 2022-12-08 PROCEDURE — 86235 NUCLEAR ANTIGEN ANTIBODY: CPT

## 2022-12-08 PROCEDURE — 83516 IMMUNOASSAY NONANTIBODY: CPT

## 2022-12-08 PROCEDURE — 86225 DNA ANTIBODY NATIVE: CPT

## 2022-12-08 PROCEDURE — 82553 CREATINE MB FRACTION: CPT

## 2022-12-08 PROCEDURE — 82085 ASSAY OF ALDOLASE: CPT

## 2022-12-08 PROCEDURE — 82525 ASSAY OF COPPER: CPT

## 2022-12-08 PROCEDURE — 86140 C-REACTIVE PROTEIN: CPT

## 2022-12-08 PROCEDURE — 85652 RBC SED RATE AUTOMATED: CPT

## 2022-12-08 PROCEDURE — 99215 OFFICE O/P EST HI 40 MIN: CPT | Performed by: NURSE PRACTITIONER

## 2022-12-08 PROCEDURE — 82390 ASSAY OF CERULOPLASMIN: CPT

## 2022-12-08 PROCEDURE — 84443 ASSAY THYROID STIM HORMONE: CPT

## 2022-12-08 PROCEDURE — 82607 VITAMIN B-12: CPT

## 2022-12-08 PROCEDURE — 82550 ASSAY OF CK (CPK): CPT

## 2022-12-08 PROCEDURE — 36415 COLL VENOUS BLD VENIPUNCTURE: CPT

## 2022-12-08 PROCEDURE — 84439 ASSAY OF FREE THYROXINE: CPT

## 2022-12-08 PROCEDURE — 82746 ASSAY OF FOLIC ACID SERUM: CPT

## 2022-12-08 PROCEDURE — 86038 ANTINUCLEAR ANTIBODIES: CPT

## 2022-12-08 RX ORDER — BUDESONIDE AND FORMOTEROL FUMARATE DIHYDRATE 160; 4.5 UG/1; UG/1
2 AEROSOL RESPIRATORY (INHALATION)
COMMUNITY
End: 2023-01-13 | Stop reason: SDUPTHER

## 2022-12-08 NOTE — PROGRESS NOTES
"Chief Complaint  Neurologic Problem    Subjective          Nora Bone presents to [facility]  History of Present Illness  States she's been having diffuse leg pain for over 1 year.  Has had vein stripping due to PVD.  PCP has noted elevated CK.  No statin use previously. States she's noticed muscle atrophy diffusely.  Is doing water PT twice weekly due to generalized muscle weakness and fatigue.        Objective   Vital Signs:   /84   Pulse 56   Ht 161.3 cm (63.5\")   Wt 58.8 kg (129 lb 9.6 oz)   BMI 22.60 kg/m²     Physical Exam  Neurological:      Mental Status: She is oriented to person, place, and time.      Cranial Nerves: Cranial nerves 2-12 are intact.      Gait: Gait is intact.      Deep Tendon Reflexes:      Reflex Scores:       Brachioradialis reflexes are 2+ on the right side and 2+ on the left side.       Patellar reflexes are 1+ on the right side and 1+ on the left side.       Neurologic Exam     Mental Status   Oriented to person, place, and time.     Cranial Nerves   Cranial nerves II through XII intact.     Motor Exam   Muscle bulk: decreased    Sensory Exam   Light touch normal.     Gait, Coordination, and Reflexes     Gait  Gait: normal    Reflexes   Right brachioradialis: 2+  Left brachioradialis: 2+  Right patellar: 1+  Left patellar: 1+       Result Review :                Assessment and Plan    Diagnoses and all orders for this visit:    1. Elevated CK (Primary)  Assessment & Plan:  Will order labs for further evaluation of elevated CK and weakness. Will consider EMG in the future if needed.     Orders:  -     CK-MB; Future  -     Sedimentation Rate; Future  -     C-reactive Protein; Future  -     Aldolase; Future  -     JAZMÍN With / DsDNA, RNP, Sjogrens A / B, Simms; Future  -     Myositis Panel III Plus; Future  -     Vitamin B12 & Folate; Future  -     Ceruloplasmin; Future  -     Copper, Serum; Future    2. Weakness  -     CK-MB; " Future  -     Sedimentation Rate; Future  -     C-reactive Protein; Future  -     Aldolase; Future  -     JAZMÍN With / DsDNA, RNP, Sjogrens A / B, Simms; Future  -     Myositis Panel III Plus; Future  -     Vitamin B12 & Folate; Future  -     Ceruloplasmin; Future  -     Copper, Serum; Future    I spent 40 minutes caring for Nora on this date of service. This time includes time spent by me in the following activities:preparing for the visit, reviewing tests, obtaining and/or reviewing a separately obtained history, performing a medically appropriate examination and/or evaluation , counseling and educating the patient/family/caregiver, ordering medications, tests, or procedures, documenting information in the medical record and independently interpreting results and communicating that information with the patient/family/caregiver  Follow Up   Return in about 2 months (around 2/8/2023).  Patient was given instructions and counseling regarding her condition or for health maintenance advice. Please see specific information pulled into the AVS if appropriate.        [Grade: ____] : Grade: [unfilled] [Drinks alcohol] : drinks alcohol [Has had sexual intercourse] : has had sexual intercourse [Vaginal] : vaginal [Uses tobacco] : does not use tobacco [Uses drugs] : does not use drugs  [de-identified] : Lives with mother and father [FreeTextEntry2] : Drinks monthly or less [de-identified] : Last sex 2/26/19, no condom used, on Depo, no new partner since last testing

## 2022-12-09 DIAGNOSIS — R74.8 ELEVATED CK: Primary | ICD-10-CM

## 2022-12-09 LAB
ALDOLASE SERPL-CCNC: 7.5 U/L (ref 3.3–10.3)
ANA SER QL: POSITIVE
CENTROMERE B AB SER-ACNC: <0.2 AI (ref 0–0.9)
CHROMATIN AB SERPL-ACNC: <0.2 AI (ref 0–0.9)
CK SERPL-CCNC: 391 U/L (ref 20–180)
DSDNA AB SER-ACNC: 10 IU/ML (ref 0–9)
ENA JO1 AB SER-ACNC: <0.2 AI (ref 0–0.9)
ENA RNP AB SER-ACNC: <0.2 AI (ref 0–0.9)
ENA SCL70 AB SER-ACNC: <0.2 AI (ref 0–0.9)
ENA SM AB SER-ACNC: <0.2 AI (ref 0–0.9)
ENA SS-A AB SER-ACNC: <0.2 AI (ref 0–0.9)
ENA SS-B AB SER-ACNC: <0.2 AI (ref 0–0.9)
Lab: ABNORMAL

## 2022-12-09 NOTE — ASSESSMENT & PLAN NOTE
Will order labs for further evaluation of elevated CK and weakness. Will consider EMG in the future if needed.

## 2022-12-11 LAB — COPPER SERPL-MCNC: 117 UG/DL (ref 80–158)

## 2022-12-12 ENCOUNTER — OFFICE VISIT (OUTPATIENT)
Dept: FAMILY MEDICINE CLINIC | Facility: CLINIC | Age: 67
End: 2022-12-12

## 2022-12-12 VITALS
DIASTOLIC BLOOD PRESSURE: 84 MMHG | BODY MASS INDEX: 22.49 KG/M2 | TEMPERATURE: 97.1 F | HEART RATE: 78 BPM | SYSTOLIC BLOOD PRESSURE: 128 MMHG | WEIGHT: 129 LBS | OXYGEN SATURATION: 98 %

## 2022-12-12 DIAGNOSIS — G47.00 INSOMNIA, UNSPECIFIED TYPE: ICD-10-CM

## 2022-12-12 DIAGNOSIS — R25.2 MUSCLE CRAMPS: ICD-10-CM

## 2022-12-12 DIAGNOSIS — M79.604 PAIN IN BOTH LOWER EXTREMITIES: ICD-10-CM

## 2022-12-12 DIAGNOSIS — M85.80 OSTEOPENIA, UNSPECIFIED LOCATION: ICD-10-CM

## 2022-12-12 DIAGNOSIS — M79.605 PAIN IN BOTH LOWER EXTREMITIES: ICD-10-CM

## 2022-12-12 DIAGNOSIS — E78.2 MIXED HYPERLIPIDEMIA: Primary | ICD-10-CM

## 2022-12-12 LAB
ALBUMIN SERPL-MCNC: 4.3 G/DL (ref 3.5–5.2)
ALBUMIN/GLOB SERPL: 1.5 G/DL
ALP SERPL-CCNC: 94 U/L (ref 39–117)
ALT SERPL W P-5'-P-CCNC: 17 U/L (ref 1–33)
ANION GAP SERPL CALCULATED.3IONS-SCNC: 10.3 MMOL/L (ref 5–15)
AST SERPL-CCNC: 19 U/L (ref 1–32)
BILIRUB SERPL-MCNC: 0.3 MG/DL (ref 0–1.2)
BUN SERPL-MCNC: 15 MG/DL (ref 8–23)
BUN/CREAT SERPL: 17.6 (ref 7–25)
CALCIUM SPEC-SCNC: 9.7 MG/DL (ref 8.6–10.5)
CHLORIDE SERPL-SCNC: 107 MMOL/L (ref 98–107)
CHOLEST SERPL-MCNC: 211 MG/DL (ref 0–200)
CO2 SERPL-SCNC: 23.7 MMOL/L (ref 22–29)
CREAT SERPL-MCNC: 0.85 MG/DL (ref 0.57–1)
EGFRCR SERPLBLD CKD-EPI 2021: 76.1 ML/MIN/1.73
GLOBULIN UR ELPH-MCNC: 2.9 GM/DL
GLUCOSE SERPL-MCNC: 91 MG/DL (ref 65–99)
HDLC SERPL-MCNC: 63 MG/DL (ref 40–60)
LDLC SERPL CALC-MCNC: 124 MG/DL (ref 0–100)
LDLC/HDLC SERPL: 1.92 {RATIO}
MAGNESIUM SERPL-MCNC: 2.1 MG/DL (ref 1.6–2.4)
POTASSIUM SERPL-SCNC: 5.2 MMOL/L (ref 3.5–5.2)
PROT SERPL-MCNC: 7.2 G/DL (ref 6–8.5)
SODIUM SERPL-SCNC: 141 MMOL/L (ref 136–145)
TRIGL SERPL-MCNC: 135 MG/DL (ref 0–150)
VLDLC SERPL-MCNC: 24 MG/DL (ref 5–40)

## 2022-12-12 PROCEDURE — 80053 COMPREHEN METABOLIC PANEL: CPT | Performed by: FAMILY MEDICINE

## 2022-12-12 PROCEDURE — 80061 LIPID PANEL: CPT | Performed by: FAMILY MEDICINE

## 2022-12-12 PROCEDURE — 36415 COLL VENOUS BLD VENIPUNCTURE: CPT | Performed by: FAMILY MEDICINE

## 2022-12-12 PROCEDURE — 99214 OFFICE O/P EST MOD 30 MIN: CPT | Performed by: FAMILY MEDICINE

## 2022-12-12 PROCEDURE — 83735 ASSAY OF MAGNESIUM: CPT | Performed by: FAMILY MEDICINE

## 2022-12-12 RX ORDER — CALCIUM CARBONATE/VITAMIN D3 500MG-5MCG
1 TABLET ORAL 2 TIMES DAILY
Qty: 180 TABLET | Refills: 1 | Status: SHIPPED | OUTPATIENT
Start: 2022-12-12

## 2022-12-12 RX ORDER — TRAZODONE HYDROCHLORIDE 50 MG/1
50 TABLET ORAL NIGHTLY
Qty: 90 TABLET | Refills: 1 | Status: SHIPPED | OUTPATIENT
Start: 2022-12-12 | End: 2023-02-21 | Stop reason: SDUPTHER

## 2022-12-12 NOTE — PROGRESS NOTES
Chief Complaint  OTHER (Wants to discuss lab results )    Subjective          Nora Bone presents to [facility]  History of Present Illness  Pt seeing neurology for muscle cramps in legs and elevated CK- she will be following up witrh them in order to discuss recent labs that they ordered    Pt has muscle cramps in legs intermittently    Pt says physical therapy does help her symptoms  Hyperlipidemia  This is a chronic problem. The current episode started more than 1 year ago. The problem is uncontrolled. Recent lipid tests were reviewed and are variable. Exacerbating diseases include hypothyroidism. There are no known factors aggravating her hyperlipidemia. Pertinent negatives include no chest pain, focal sensory loss, focal weakness, leg pain, myalgias or shortness of breath. Current antihyperlipidemic treatment includes diet change. The current treatment provides moderate improvement of lipids. There are no compliance problems.  Risk factors for coronary artery disease include dyslipidemia, family history and post-menopausal.   Hypothyroidism  This is a chronic problem. The current episode started more than 1 year ago. The problem occurs rarely. The problem has been gradually improving. Pertinent negatives include no abdominal pain, anorexia, arthralgias, change in bowel habit, chest pain, chills, congestion, coughing, diaphoresis, fatigue, fever, headaches, joint swelling, myalgias, nausea, neck pain, numbness, rash, sore throat, swollen glands, urinary symptoms, vertigo, visual change, vomiting or weakness. Nothing aggravates the symptoms. Treatments tried: synthroid. The treatment provided significant relief.   Insomnia  This is a chronic problem. The current episode started more than 1 year ago. The problem occurs constantly. The problem has been unchanged. Pertinent negatives include no abdominal pain, anorexia, arthralgias, change in bowel habit, chest pain, chills,  congestion, coughing, diaphoresis, fatigue, fever, headaches, joint swelling, myalgias, nausea, neck pain, numbness, rash, sore throat, swollen glands, urinary symptoms, vertigo, visual change, vomiting or weakness. Nothing aggravates the symptoms. Treatments tried: sleep hygiene. The treatment provided no relief.       Objective   Allergies   Allergen Reactions   • Hydrocodone-Acetaminophen Nausea And Vomiting     Also reports shaking        Immunization History   Administered Date(s) Administered   • COVID-19 (MODERNA) 1st, 2nd, 3rd Dose Only 2021, 2021   • Flu Vaccine Quad PF >36MO 2017, 2018, 10/15/2019, 2021   • FluLaval/Fluzone >6mos 10/28/2021   • Fluzone High-Dose 65+yrs 2022   • Hepatitis A 2018, 2018   • Pneumococcal Polysaccharide (PPSV23) 10/28/2021   • Tdap 2018     Past Medical History:   Diagnosis Date   • Anemia    • Anxiety    • Asthma    • Back pain    • Cancer (HCC)    • Cervical cancer (HCC)    • COPD (chronic obstructive pulmonary disease) (HCC)    • Deep vein thrombosis (HCC)    • Disease of thyroid gland    • Diverticulosis    • Hearing loss    • Peripheral vascular disease (HCC)       Past Surgical History:   Procedure Laterality Date   • COLONOSCOPY W/ BIOPSIES     • DILATATION AND CURETTAGE     • VAGINAL HYSTERECTOMY     • VEIN SURGERY Right       Social History     Socioeconomic History   • Marital status: Single   Tobacco Use   • Smoking status: Former     Packs/day: 1.00     Years: 20.00     Pack years: 20.00     Types: Cigarettes     Quit date: 2015     Years since quittin.9   • Smokeless tobacco: Never   Vaping Use   • Vaping Use: Former   Substance and Sexual Activity   • Alcohol use: Never   • Drug use: Yes     Types: Marijuana   • Sexual activity: Yes     Birth control/protection: Surgical        Current Outpatient Medications:   •  albuterol sulfate  (90 Base) MCG/ACT inhaler, INHALE 2 PUFFS BY MOUTH FOUR TIMES  "DAILY AS NEEDED FOR WHEEZING, Disp: 8.5 g, Rfl: 0  •  aspirin 81 MG chewable tablet, Chew 81 mg Daily., Disp: , Rfl:   •  B-D 3CC LUER-LUCINA SYR 25GX1\" 25G X 1\" 3 ML misc, use once monthly for b12 injection, Disp: 3 each, Rfl: 0  •  budesonide-formoterol (SYMBICORT) 160-4.5 MCG/ACT inhaler, Inhale 2 puffs 2 (Two) Times a Day., Disp: , Rfl:   •  cyanocobalamin 1000 MCG/ML injection, Inject 1 mL into the appropriate muscle as directed by prescriber Every 28 (Twenty-Eight) Days., Disp: 3 mL, Rfl: 3  •  Dietary Management Product (Vasculera) tablet, Take 1 tablet by mouth Daily., Disp: 30 tablet, Rfl: 11  •  fluticasone (FLONASE) 50 MCG/ACT nasal spray, 1 spray into the nostril(s) as directed by provider Daily., Disp: 48 g, Rfl: 5  •  ibuprofen (ADVIL,MOTRIN) 800 MG tablet, TAKE 1 TABLET BY MOUTH TWICE DAILY AS NEEDED FOR MILD TO MODERATE PAIN, Disp: 60 tablet, Rfl: 0  •  levothyroxine (SYNTHROID, LEVOTHROID) 100 MCG tablet, TAKE 1 TABLET BY MOUTH EVERY DAY, Disp: 30 tablet, Rfl: 0  •  Probiotic Product (Bugcrowd PO), Take  by mouth., Disp: , Rfl:   •  Calcium Carb-Cholecalciferol (Calcium 500 + D3) 500-5 MG-MCG tablet per tablet, Take 1 tablet by mouth 2 (Two) Times a Day., Disp: 180 tablet, Rfl: 1  •  Diclofenac Sodium (VOLTAREN) 1 % gel gel, Apply 4 g topically to the appropriate area as directed 4 (Four) Times a Day As Needed (leg pain)., Disp: 350 g, Rfl: 5  •  traZODone (DESYREL) 50 MG tablet, Take 1 tablet by mouth Every Night., Disp: 90 tablet, Rfl: 1    Current Facility-Administered Medications:   •  cyanocobalamin injection 1,000 mcg, 1,000 mcg, Intramuscular, Q28 Days, Jarrod Simms MD, 1,000 mcg at 11/04/22 1223   Family History   Problem Relation Age of Onset   • Ovarian cancer Mother    • Heart disease Mother    • Thyroid disease Mother    • Breast cancer Sister    • Breast cancer Paternal Aunt    • Epilepsy Father           Vital Signs:   Vitals:    12/12/22 0948   BP: 128/84   Pulse: " 78   Temp: 97.1 °F (36.2 °C)   SpO2: 98%   Weight: 58.5 kg (129 lb)       Review of Systems   Constitutional: Negative for chills, diaphoresis, fatigue and fever.   HENT: Negative for congestion and sore throat.    Respiratory: Negative for cough and shortness of breath.    Cardiovascular: Negative for chest pain.   Gastrointestinal: Negative for abdominal pain, anorexia, change in bowel habit, nausea and vomiting.   Musculoskeletal: Negative for arthralgias, joint swelling, myalgias and neck pain.   Skin: Negative for rash.   Neurological: Negative for vertigo, focal weakness, weakness, numbness and headaches.   Psychiatric/Behavioral: The patient has insomnia.       Physical Exam  Vitals reviewed.   Constitutional:       Appearance: Normal appearance. She is well-developed.   HENT:      Head: Normocephalic and atraumatic.      Right Ear: External ear normal.      Left Ear: External ear normal.      Mouth/Throat:      Pharynx: No oropharyngeal exudate.   Eyes:      Conjunctiva/sclera: Conjunctivae normal.      Pupils: Pupils are equal, round, and reactive to light.   Cardiovascular:      Rate and Rhythm: Normal rate and regular rhythm.      Pulses: Normal pulses.      Heart sounds: Normal heart sounds. No murmur heard.    No friction rub. No gallop.   Pulmonary:      Effort: Pulmonary effort is normal.      Breath sounds: Normal breath sounds. No wheezing or rhonchi.   Abdominal:      General: Bowel sounds are normal. There is no distension.      Palpations: Abdomen is soft.      Tenderness: There is no abdominal tenderness.   Skin:     General: Skin is warm and dry.   Neurological:      Mental Status: She is alert and oriented to person, place, and time.      Cranial Nerves: No cranial nerve deficit.   Psychiatric:         Mood and Affect: Mood and affect normal.         Behavior: Behavior normal.         Thought Content: Thought content normal.         Judgment: Judgment normal.        Result Review :   The  following data was reviewed by: Jarrod Simms MD on 12/12/2022:  CMP    CMP 8/18/22   Glucose 91   BUN 12   Creatinine 0.99   Sodium 140   Potassium 4.4   Chloride 102   Calcium 9.4   Albumin 4.30   Total Bilirubin 0.2   Alkaline Phosphatase 93   AST (SGOT) 28   ALT (SGPT) 20           CBC    CBC 8/18/22   WBC 9.70   RBC 5.09   Hemoglobin 14.7   Hematocrit 44.5   MCV 87.4   MCH 28.9   MCHC 33.0   RDW 13.0   Platelets 214           Lipid Panel    Lipid Panel 8/18/22   Total Cholesterol 212 (A)   Triglycerides 128   HDL Cholesterol 60   VLDL Cholesterol 23   LDL Cholesterol  129 (A)   LDL/HDL Ratio 2.11   (A) Abnormal value            TSH    TSH 8/18/22 12/8/22   TSH 9.730 (A) 0.443   (A) Abnormal value                      Assessment and Plan    Diagnoses and all orders for this visit:    1. Mixed hyperlipidemia (Primary)  -     Lipid Panel    2. Muscle cramps  -     Comprehensive Metabolic Panel  -     Magnesium    3. Pain in both lower extremities  -     Diclofenac Sodium (VOLTAREN) 1 % gel gel; Apply 4 g topically to the appropriate area as directed 4 (Four) Times a Day As Needed (leg pain).  Dispense: 350 g; Refill: 5    4. Osteopenia, unspecified location  -     Calcium Carb-Cholecalciferol (Calcium 500 + D3) 500-5 MG-MCG tablet per tablet; Take 1 tablet by mouth 2 (Two) Times a Day.  Dispense: 180 tablet; Refill: 1    5. Insomnia, unspecified type  -     traZODone (DESYREL) 50 MG tablet; Take 1 tablet by mouth Every Night.  Dispense: 90 tablet; Refill: 1            Follow Up   Return in about 1 month (around 1/12/2023) for Recheck.  Patient was given instructions and counseling regarding her condition or for health maintenance advice. Please see specific information pulled into the AVS if appropriate.

## 2022-12-12 NOTE — PROGRESS NOTES
Venipuncture Blood Specimen Collection  Venipuncture performed in left arm by Raquel Petersen with good hemostasis. Patient tolerated the procedure well without complications.   12/12/22   Raquel Petersen

## 2022-12-20 LAB
EJ AB SER QL: NEGATIVE
ENA JO1 AB SER IA-ACNC: <20 UNITS
ENA PM/SCL AB SER-ACNC: <20 UNITS
ENA SS-A 52KD IGG SER IA-ACNC: <20 UNITS
FIBRILLARIN AB SER QL: NEGATIVE
KU AB SER QL: NEGATIVE
MDA5 AB SER LINE BLOT-ACNC: <20 UNITS
MI2 AB SER QL: NEGATIVE
MJ AB SER LINE BLOT-ACNC: <20 UNITS
OJ AB SER QL: NEGATIVE
PL12 AB SER QL: NEGATIVE
PL7 AB SER QL: NEGATIVE
SAE1 IGG SER QL LINE BLOT: <20 UNITS
SRP AB SERPL QL: NEGATIVE
TIF1-GAMMA AB SER IA-ACNC: <20 UNITS
U1 SNRNP AB SER IA-ACNC: <20 UNITS
U2 SNRNP AB SER QL: NEGATIVE

## 2023-01-05 ENCOUNTER — TELEPHONE (OUTPATIENT)
Dept: FAMILY MEDICINE CLINIC | Facility: CLINIC | Age: 68
End: 2023-01-05
Payer: MEDICARE

## 2023-01-05 DIAGNOSIS — E03.9 HYPOTHYROIDISM, UNSPECIFIED TYPE: ICD-10-CM

## 2023-01-05 NOTE — TELEPHONE ENCOUNTER
Caller: Nora Bone    Relationship: Self    Best call back number: [phone]    Requested Prescriptions:   Requested Prescriptions     Pending Prescriptions Disp Refills   • levothyroxine (SYNTHROID, LEVOTHROID) 100 MCG tablet 30 tablet 0     Sig: Take 1 tablet by mouth Daily.        Pharmacy where request should be sent: [address] [phone] [facility] [phone] [facility]       Does the patient have less than a 3 day supply:  [x] Yes  [] No    Would you like a call back once the refill request has been completed: [x] Yes [] No    If the office needs to give you a call back, can they leave a voicemail: [x] Yes [] No    Lin Pablo Rep   01/05/23 16:49 EST

## 2023-01-06 RX ORDER — LEVOTHYROXINE SODIUM 0.1 MG/1
100 TABLET ORAL DAILY
Qty: 30 TABLET | Refills: 0 | Status: SHIPPED | OUTPATIENT
Start: 2023-01-06 | End: 2023-02-08

## 2023-01-13 ENCOUNTER — OFFICE VISIT (OUTPATIENT)
Dept: FAMILY MEDICINE CLINIC | Facility: CLINIC | Age: 68
End: 2023-01-13
Payer: MEDICARE

## 2023-01-13 VITALS
WEIGHT: 131.4 LBS | OXYGEN SATURATION: 98 % | DIASTOLIC BLOOD PRESSURE: 70 MMHG | HEART RATE: 91 BPM | HEIGHT: 64 IN | SYSTOLIC BLOOD PRESSURE: 128 MMHG | BODY MASS INDEX: 22.43 KG/M2 | TEMPERATURE: 97.8 F

## 2023-01-13 DIAGNOSIS — M79.604 PAIN IN BOTH LOWER EXTREMITIES: Primary | ICD-10-CM

## 2023-01-13 DIAGNOSIS — J45.40 MODERATE PERSISTENT ASTHMA WITHOUT COMPLICATION: ICD-10-CM

## 2023-01-13 DIAGNOSIS — M79.605 PAIN IN BOTH LOWER EXTREMITIES: Primary | ICD-10-CM

## 2023-01-13 PROBLEM — E05.90 HYPERTHYROIDISM: Status: ACTIVE | Noted: 2023-01-13

## 2023-01-13 PROBLEM — N75.0 BARTHOLIN'S GLAND CYST: Status: ACTIVE | Noted: 2017-04-20

## 2023-01-13 PROBLEM — J98.4 LUNG DISEASE: Status: ACTIVE | Noted: 2023-01-13

## 2023-01-13 PROBLEM — N20.0 KIDNEY STONES: Status: ACTIVE | Noted: 2023-01-13

## 2023-01-13 PROBLEM — M79.89 LEG SWELLING: Status: ACTIVE | Noted: 2020-12-10

## 2023-01-13 PROCEDURE — 99213 OFFICE O/P EST LOW 20 MIN: CPT | Performed by: FAMILY MEDICINE

## 2023-01-13 RX ORDER — BUDESONIDE AND FORMOTEROL FUMARATE DIHYDRATE 160; 4.5 UG/1; UG/1
2 AEROSOL RESPIRATORY (INHALATION)
Qty: 10.2 G | Refills: 5 | Status: SHIPPED | OUTPATIENT
Start: 2023-01-13

## 2023-01-13 NOTE — PROGRESS NOTES
Chief Complaint  Lower Extremity Pain (Follow-up, would like to discuss Rx for compound cream) and Hyperlipidemia (Follow-up)    Subjective          Nora Bone presents to [facility]  History of Present Illness  Pt says that she has been using a compound ointment from [facility] 1/14/1938- lidocaine, clonidine, diclodenac and it is helping her legs- she would like a script for this from [facility]- we called [facility] and they will compound this for her with same directions 1-2 times daily  Hyperlipidemia  This is a chronic problem. The current episode started more than 1 year ago. The problem is controlled. Recent lipid tests were reviewed and are variable. There are no known factors aggravating her hyperlipidemia. Associated symptoms include leg pain. Pertinent negatives include no chest pain, focal sensory loss, focal weakness, myalgias or shortness of breath. Current antihyperlipidemic treatment includes diet change. The current treatment provides significant improvement of lipids. There are no compliance problems.  Risk factors for coronary artery disease include dyslipidemia.       Objective   Allergies   Allergen Reactions   • Hydrocodone-Acetaminophen Nausea And Vomiting     Also reports shaking        Immunization History   Administered Date(s) Administered   • COVID-19 (MODERNA) 1st, 2nd, 3rd Dose Only 08/20/2021, 09/17/2021   • Flu Vaccine Quad PF >36MO 02/07/2017, 09/27/2018, 10/15/2019, 01/12/2021   • FluLaval/Fluzone >6mos 10/28/2021   • Fluzone High-Dose 65+yrs 11/04/2022   • Hepatitis A 03/13/2018, 09/13/2018   • Pneumococcal Polysaccharide (PPSV23) 10/28/2021   • Tdap 09/27/2018     Past Medical History:   Diagnosis Date   • Anemia    • Anxiety    • Asthma    • Back pain    • Cancer (HCC)    • Cervical cancer (HCC)    • COPD (chronic obstructive pulmonary disease) (HCC)    • Deep vein thrombosis (HCC)    • Disease of thyroid gland    • Diverticulosis    • Hearing  "loss    • Hyperlipidemia    • Peripheral vascular disease (HCC)       Past Surgical History:   Procedure Laterality Date   • COLONOSCOPY W/ BIOPSIES     • DILATATION AND CURETTAGE     • VAGINAL HYSTERECTOMY     • VEIN SURGERY Right       Social History     Socioeconomic History   • Marital status: Single   Tobacco Use   • Smoking status: Former     Packs/day: 1.00     Years: 20.00     Pack years: 20.00     Types: Cigarettes     Quit date: 2015     Years since quittin.0   • Smokeless tobacco: Never   Vaping Use   • Vaping Use: Former   Substance and Sexual Activity   • Alcohol use: Never   • Drug use: Yes     Types: Marijuana   • Sexual activity: Yes     Birth control/protection: Surgical        Current Outpatient Medications:   •  albuterol sulfate  (90 Base) MCG/ACT inhaler, INHALE 2 PUFFS BY MOUTH FOUR TIMES DAILY AS NEEDED FOR WHEEZING, Disp: 8.5 g, Rfl: 0  •  aspirin 81 MG chewable tablet, Chew 81 mg Daily., Disp: , Rfl:   •  B-D 3CC LUER-LUCINA SYR 25GX1\" 25G X 1\" 3 ML misc, use once monthly for b12 injection, Disp: 3 each, Rfl: 0  •  budesonide-formoterol (SYMBICORT) 160-4.5 MCG/ACT inhaler, Inhale 2 puffs 2 (Two) Times a Day., Disp: 10.2 g, Rfl: 5  •  Calcium Carb-Cholecalciferol (Calcium 500 + D3) 500-5 MG-MCG tablet per tablet, Take 1 tablet by mouth 2 (Two) Times a Day., Disp: 180 tablet, Rfl: 1  •  cyanocobalamin 1000 MCG/ML injection, Inject 1 mL into the appropriate muscle as directed by prescriber Every 28 (Twenty-Eight) Days., Disp: 3 mL, Rfl: 3  •  Dietary Management Product (Vasculera) tablet, Take 1 tablet by mouth Daily., Disp: 30 tablet, Rfl: 11  •  fluticasone (FLONASE) 50 MCG/ACT nasal spray, 1 spray into the nostril(s) as directed by provider Daily., Disp: 48 g, Rfl: 5  •  ibuprofen (ADVIL,MOTRIN) 800 MG tablet, TAKE 1 TABLET BY MOUTH TWICE DAILY AS NEEDED FOR MILD TO MODERATE PAIN, Disp: 60 tablet, Rfl: 0  •  levothyroxine (SYNTHROID, LEVOTHROID) 100 MCG tablet, Take 1 tablet by " "mouth Daily., Disp: 30 tablet, Rfl: 0  •  Probiotic Product (Neuravi PO), Take  by mouth., Disp: , Rfl:   •  traZODone (DESYREL) 50 MG tablet, Take 1 tablet by mouth Every Night., Disp: 90 tablet, Rfl: 1  No current facility-administered medications for this visit.   Family History   Problem Relation Age of Onset   • Ovarian cancer Mother    • Heart disease Mother    • Thyroid disease Mother    • Breast cancer Sister    • Breast cancer Paternal Aunt    • Epilepsy Father           Vital Signs:   Vitals:    01/13/23 1143   BP: 128/70   BP Location: Left arm   Patient Position: Sitting   Cuff Size: Adult   Pulse: 91   Temp: 97.8 °F (36.6 °C)   TempSrc: Temporal   SpO2: 98%   Weight: 59.6 kg (131 lb 6.4 oz)   Height: 161.3 cm (63.5\")       Review of Systems   Respiratory: Negative for shortness of breath.    Cardiovascular: Negative for chest pain.   Musculoskeletal: Negative for myalgias.   Neurological: Negative for focal weakness.      Physical Exam  Vitals reviewed.   Constitutional:       Appearance: Normal appearance. She is well-developed.   HENT:      Head: Normocephalic and atraumatic.      Right Ear: External ear normal.      Left Ear: External ear normal.      Mouth/Throat:      Pharynx: No oropharyngeal exudate.   Eyes:      Conjunctiva/sclera: Conjunctivae normal.      Pupils: Pupils are equal, round, and reactive to light.   Cardiovascular:      Rate and Rhythm: Normal rate and regular rhythm.      Pulses: Normal pulses.      Heart sounds: Normal heart sounds. No murmur heard.    No friction rub. No gallop.   Pulmonary:      Effort: Pulmonary effort is normal.      Breath sounds: Normal breath sounds. No wheezing or rhonchi.   Abdominal:      General: Abdomen is flat. Bowel sounds are normal. There is no distension.      Palpations: Abdomen is soft. There is no mass.      Tenderness: There is no abdominal tenderness. There is no guarding or rebound.      Hernia: No hernia is present. "   Musculoskeletal:         General: Normal range of motion.   Skin:     General: Skin is warm and dry.      Capillary Refill: Capillary refill takes less than 2 seconds.   Neurological:      General: No focal deficit present.      Mental Status: She is alert and oriented to person, place, and time.      Cranial Nerves: No cranial nerve deficit.   Psychiatric:         Mood and Affect: Mood and affect normal.         Behavior: Behavior normal.         Thought Content: Thought content normal.         Judgment: Judgment normal.        Result Review :                 Assessment and Plan    Diagnoses and all orders for this visit:    1. Pain in both lower extremities (Primary)    2. Moderate persistent asthma without complication  -     budesonide-formoterol (SYMBICORT) 160-4.5 MCG/ACT inhaler; Inhale 2 puffs 2 (Two) Times a Day.  Dispense: 10.2 g; Refill: 5    will use diclofen/clonidine/diclofen compound ointment for legs        Follow Up   Return in about 3 months (around 4/13/2023) for Recheck leg pain tx.  Patient was given instructions and counseling regarding her condition or for health maintenance advice. Please see specific information pulled into the AVS if appropriate.   Pt will see neurology for leg pain follow-up in February 2023.

## 2023-02-02 NOTE — PROGRESS NOTES
Call pt:  Her quantitative RNA for hepatitis C is negative, so this means her body has cleared the virus and she does not have to see GI and nothing further is needed to be done at this time. Biopsy Method: Dermablade

## 2023-02-08 DIAGNOSIS — E03.9 HYPOTHYROIDISM, UNSPECIFIED TYPE: ICD-10-CM

## 2023-02-08 RX ORDER — LEVOTHYROXINE SODIUM 0.1 MG/1
100 TABLET ORAL DAILY
Qty: 30 TABLET | Refills: 0 | Status: SHIPPED | OUTPATIENT
Start: 2023-02-08 | End: 2023-03-03

## 2023-02-13 ENCOUNTER — OFFICE VISIT (OUTPATIENT)
Dept: NEUROLOGY | Facility: CLINIC | Age: 68
End: 2023-02-13
Payer: MEDICARE

## 2023-02-13 ENCOUNTER — LAB (OUTPATIENT)
Dept: LAB | Facility: HOSPITAL | Age: 68
End: 2023-02-13
Payer: MEDICARE

## 2023-02-13 VITALS
HEIGHT: 64 IN | DIASTOLIC BLOOD PRESSURE: 79 MMHG | BODY MASS INDEX: 21.87 KG/M2 | WEIGHT: 128.1 LBS | HEART RATE: 69 BPM | SYSTOLIC BLOOD PRESSURE: 143 MMHG

## 2023-02-13 DIAGNOSIS — R76.8 ANA POSITIVE: ICD-10-CM

## 2023-02-13 DIAGNOSIS — R74.8 ELEVATED CK: Primary | ICD-10-CM

## 2023-02-13 DIAGNOSIS — R74.8 ELEVATED CK: ICD-10-CM

## 2023-02-13 LAB — CK SERPL-CCNC: 364 U/L (ref 20–180)

## 2023-02-13 PROCEDURE — 36415 COLL VENOUS BLD VENIPUNCTURE: CPT

## 2023-02-13 PROCEDURE — 99214 OFFICE O/P EST MOD 30 MIN: CPT | Performed by: NURSE PRACTITIONER

## 2023-02-13 PROCEDURE — 82550 ASSAY OF CK (CPK): CPT

## 2023-02-17 NOTE — PROGRESS NOTES
"Chief Complaint  Neurologic Problem    Subjective          Nora Bone presents to [facility]  History of Present Illness  Continues to have diffuse pain and diffuse weakness.  Following up to discuss labs results.  Continues to go to PT.     Interval History:   States she's been having diffuse leg pain for over 1 year.  Has had vein stripping due to PVD.  PCP has noted elevated CK.  No statin use previously. States she's noticed muscle atrophy diffusely.  Is doing water PT twice weekly due to generalized muscle weakness and fatigue.        Objective   Vital Signs:   /79   Pulse 69   Ht 161.3 cm (63.5\")   Wt 58.1 kg (128 lb 1.6 oz)   BMI 22.34 kg/m²     Physical Exam  Neurological:      Mental Status: She is oriented to person, place, and time.      Cranial Nerves: Cranial nerves 2-12 are intact.      Gait: Gait is intact.      Deep Tendon Reflexes:      Reflex Scores:       Brachioradialis reflexes are 2+ on the right side and 2+ on the left side.       Patellar reflexes are 1+ on the right side and 1+ on the left side.       Neurologic Exam     Mental Status   Oriented to person, place, and time.     Cranial Nerves   Cranial nerves II through XII intact.     Gait, Coordination, and Reflexes     Gait  Gait: normal    Reflexes   Right brachioradialis: 2+  Left brachioradialis: 2+  Right patellar: 1+  Left patellar: 1+       Result Review :   CBC:  Lab Results   Component Value Date    WBC 9.70 08/18/2022    RBC 5.09 08/18/2022    HGB 14.7 08/18/2022    HCT 44.5 08/18/2022    MCV 87.4 08/18/2022    MCH 28.9 08/18/2022    MCHC 33.0 08/18/2022    RDW 13.0 08/18/2022     08/18/2022     CMP:  Lab Results   Component Value Date    BUN 15 12/12/2022    CREATININE 0.85 12/12/2022    EGFRIFNONA 59 (L) 10/28/2021     12/12/2022    K 5.2 12/12/2022     12/12/2022    CALCIUM 9.7 12/12/2022    ALBUMIN 4.30 12/12/2022    BILITOT 0.3 12/12/2022    ALKPHOS 94 " 12/12/2022    AST 19 12/12/2022    ALT 17 12/12/2022     B12:   Lab Results   Component Value Date    VJWSLCDT59 392 12/08/2022      FOLATE:   Lab Results   Component Value Date    FOLATE 7.41 12/08/2022     CRP:   Lab Results   Component Value Date    CRP 0.63 (H) 12/08/2022      SED RATE:  Lab Results   Component Value Date    SEDRATE 5 12/08/2022     Creatine Kinase   Date Value Ref Range Status   02/13/2023 364 (H) 20 - 180 U/L Final     JAZMÍN Direct  Negative Positive Abnormal     Anti-DNA (DS) Ab Qn  0 - 9 IU/mL 10 High     Comment:                                    Negative      <5                                      Equivocal  5 - 9                                      Positive      >9   RNP Antibodies  0.0 - 0.9 AI <0.2    Simms Antibodies  0.0 - 0.9 AI <0.2    Antiscleroderma-70 Antibodies  0.0 - 0.9 AI <0.2    AGUSTINA SSA (RO) Ab  0.0 - 0.9 AI <0.2    AGUSTINA SSB (LA) Ab  0.0 - 0.9 AI <0.2    Antichromatin Antibodies  0.0 - 0.9 AI <0.2    WINNIE-1 IgG  0.0 - 0.9 AI <0.2    Anti-Centromere B Antibodies  0.0 - 0.9 AI <0.2                  Assessment and Plan    Diagnoses and all orders for this visit:    1. Elevated CK (Primary)  Assessment & Plan:  JAZMÍN positive.  CK continues to be elevated.  Myositis panel negative.  Will refer to rheumatology for further evaluation.     Orders:  -     CK; Future  -     Ambulatory Referral to Rheumatology    2. JAZMÍN positive  -     Ambulatory Referral to Rheumatology      Follow Up   Return in about 4 months (around 6/13/2023) for paresthesia .  Patient was given instructions and counseling regarding her condition or for health maintenance advice. Please see specific information pulled into the AVS if appropriate.

## 2023-02-17 NOTE — ASSESSMENT & PLAN NOTE
JAZMÍN positive.  CK continues to be elevated.  Myositis panel negative.  Will refer to rheumatology for further evaluation.

## 2023-02-21 ENCOUNTER — TELEPHONE (OUTPATIENT)
Dept: FAMILY MEDICINE CLINIC | Facility: CLINIC | Age: 68
End: 2023-02-21

## 2023-02-21 DIAGNOSIS — G47.00 INSOMNIA, UNSPECIFIED TYPE: ICD-10-CM

## 2023-02-21 RX ORDER — TRAZODONE HYDROCHLORIDE 100 MG/1
100 TABLET ORAL NIGHTLY
Qty: 30 TABLET | Refills: 1 | Status: SHIPPED | OUTPATIENT
Start: 2023-02-21 | End: 2023-03-30

## 2023-02-21 NOTE — TELEPHONE ENCOUNTER
Caller: Nora Bone    Relationship: Self    Best call back number: [phone]    What was the call regarding:        THE PATIENT SAID SHE GOT THROUGH TO THE INSURANCE COMPANY FOR HER COMPOUND  OINTMENT.  SHE SAID THEY WILL BE SENDING OVER A FORM FOR PCP MARV TO FILL OUT AND  SEND BACK     SHE IS WANTING TO KNOW IF PCP WOULD INCREASE  traZODone (DESYREL)  FROM 50   MG

## 2023-02-21 NOTE — TELEPHONE ENCOUNTER
Caller: Nora Bone    Relationship to patient: Self    Best call back number: [phone]    Patient is needing: PATIENT CALLED AND STATED THAT SHE WANTED TO ADD TO THIS NOTE THAT SHE CAN GO TO PHYSICAL THERAPY FOR A YEAR AT [facility]AP[facility], ACCORDING TO HER INSURANCE. PATIENT STATED THAT THIS IS HELPING A LOT.

## 2023-02-21 NOTE — TELEPHONE ENCOUNTER
Caller: Nora Bone    Relationship to patient: Self    Best call back number: [phone]    Chief complaint: PATIENT CALLED TO ADD A NOTE TO AN EXISTING ENCOUNTER ABOUT PHYSICAL THERAPY AND STATED THAT HER ENTIRE RIGHT SIDE IS NUMB.    Patient directed to call 911 or go to their nearest emergency room. YES    Patient verbalized understanding: [] Yes  [x] No  If no, why?    Additional notes:PATIENT STATED THAT SHE DID NOT WANT TO GO TO THE ER OR SPEAK WITH ANYONE IN THE OFFICE ABOUT THIS BECAUSE SHE HAS BEEN DEALING WITH THIS FOR A LONG TIME. PATIENT WAS ADVISED TO SPEAK WITH A NURSE SEVERAL TIMES AT THE OFFICE AFTER DECLINING GOING TO THE ER,  BUT DECLINED AND STATED THAT SHE WOULD CALL IF IT GETS WORSE.

## 2023-03-03 DIAGNOSIS — E03.9 HYPOTHYROIDISM, UNSPECIFIED TYPE: ICD-10-CM

## 2023-03-03 RX ORDER — LEVOTHYROXINE SODIUM 0.1 MG/1
100 TABLET ORAL DAILY
Qty: 30 TABLET | Refills: 0 | Status: SHIPPED | OUTPATIENT
Start: 2023-03-03 | End: 2023-03-30 | Stop reason: SDUPTHER

## 2023-03-22 DIAGNOSIS — G47.00 INSOMNIA, UNSPECIFIED TYPE: ICD-10-CM

## 2023-03-22 RX ORDER — TRAZODONE HYDROCHLORIDE 50 MG/1
50 TABLET ORAL NIGHTLY
Qty: 90 TABLET | Refills: 1 | OUTPATIENT
Start: 2023-03-22

## 2023-03-30 ENCOUNTER — OFFICE VISIT (OUTPATIENT)
Dept: FAMILY MEDICINE CLINIC | Facility: CLINIC | Age: 68
End: 2023-03-30
Payer: MEDICARE

## 2023-03-30 VITALS
OXYGEN SATURATION: 98 % | BODY MASS INDEX: 23.02 KG/M2 | DIASTOLIC BLOOD PRESSURE: 80 MMHG | TEMPERATURE: 97.1 F | WEIGHT: 132 LBS | HEART RATE: 67 BPM | SYSTOLIC BLOOD PRESSURE: 126 MMHG

## 2023-03-30 DIAGNOSIS — R74.8 ELEVATED CK: Primary | ICD-10-CM

## 2023-03-30 DIAGNOSIS — M54.6 ACUTE RIGHT-SIDED THORACIC BACK PAIN: ICD-10-CM

## 2023-03-30 DIAGNOSIS — M25.511 ACUTE PAIN OF RIGHT SHOULDER: ICD-10-CM

## 2023-03-30 DIAGNOSIS — M19.90 ARTHRITIS: ICD-10-CM

## 2023-03-30 DIAGNOSIS — E03.9 HYPOTHYROIDISM, UNSPECIFIED TYPE: ICD-10-CM

## 2023-03-30 DIAGNOSIS — R76.8 POSITIVE ANA (ANTINUCLEAR ANTIBODY): ICD-10-CM

## 2023-03-30 LAB
T4 FREE SERPL-MCNC: 1.21 NG/DL (ref 0.93–1.7)
TSH SERPL DL<=0.05 MIU/L-ACNC: 7.14 UIU/ML (ref 0.27–4.2)

## 2023-03-30 PROCEDURE — 84443 ASSAY THYROID STIM HORMONE: CPT | Performed by: FAMILY MEDICINE

## 2023-03-30 PROCEDURE — 84439 ASSAY OF FREE THYROXINE: CPT | Performed by: FAMILY MEDICINE

## 2023-03-30 RX ORDER — BACLOFEN 10 MG/1
10 TABLET ORAL 3 TIMES DAILY
Qty: 30 TABLET | Refills: 1 | Status: SHIPPED | OUTPATIENT
Start: 2023-03-30

## 2023-03-30 RX ORDER — LEVOTHYROXINE SODIUM 0.1 MG/1
100 TABLET ORAL DAILY
Qty: 90 TABLET | Refills: 1 | Status: SHIPPED | OUTPATIENT
Start: 2023-03-30 | End: 2023-03-31 | Stop reason: SDUPTHER

## 2023-03-30 RX ORDER — CELECOXIB 200 MG/1
200 CAPSULE ORAL DAILY
Qty: 30 CAPSULE | Refills: 0 | Status: SHIPPED | OUTPATIENT
Start: 2023-03-30

## 2023-03-30 NOTE — PROGRESS NOTES
Chief Complaint  Hand Pain and Hypothyroidism (Refill/labs )    Subjective          Nora Bone presents to [facility]  History of Present Illness  Pt fell at [employer] when she tripped and hit right shoulder, upper back- fell on February 6, 2023- since then has had pain in right upper back, right shoulder- pain goes down arm into fingers- numbness and weakness that is intermittent in right arm and hand    Pt has no h/o seizures  Hypothyroidism  This is a chronic problem. The current episode started more than 1 year ago. The problem occurs intermittently. The problem has been gradually improving. Pertinent negatives include no abdominal pain, anorexia, arthralgias, change in bowel habit, chest pain, chills, congestion, coughing, diaphoresis, fatigue, fever, headaches, joint swelling, myalgias, nausea, neck pain, numbness, rash, sore throat, swollen glands, urinary symptoms, vertigo, visual change, vomiting or weakness. Nothing aggravates the symptoms. Treatments tried: synthroid. The treatment provided significant relief.       Objective   Allergies   Allergen Reactions   • Hydrocodone-Acetaminophen Nausea And Vomiting     Also reports shaking        Immunization History   Administered Date(s) Administered   • COVID-19 (MODERNA) 1st, 2nd, 3rd Dose Only 08/20/2021, 09/17/2021   • Flu Vaccine Quad PF >36MO 02/07/2017, 09/27/2018, 10/15/2019, 01/12/2021   • FluLaval/Fluzone >6mos 10/28/2021   • Fluzone High-Dose 65+yrs 11/04/2022   • Hepatitis A 03/13/2018, 09/13/2018   • Pneumococcal Polysaccharide (PPSV23) 10/28/2021   • Tdap 09/27/2018     Past Medical History:   Diagnosis Date   • Anemia    • Anxiety    • Asthma    • Back pain    • Cancer (HCC)    • Cervical cancer (HCC)    • COPD (chronic obstructive pulmonary disease) (HCC)    • Deep vein thrombosis (HCC)    • Disease of thyroid gland    • Diverticulosis    • Hearing loss    • Hyperlipidemia    • Peripheral vascular  "disease (HCC)       Past Surgical History:   Procedure Laterality Date   • COLONOSCOPY W/ BIOPSIES     • DILATATION AND CURETTAGE     • VAGINAL HYSTERECTOMY     • VEIN SURGERY Right       Social History     Socioeconomic History   • Marital status: Single   Tobacco Use   • Smoking status: Former     Packs/day: 1.00     Years: 20.00     Pack years: 20.00     Types: Cigarettes     Quit date: 2015     Years since quittin.2   • Smokeless tobacco: Never   Vaping Use   • Vaping Use: Former   Substance and Sexual Activity   • Alcohol use: Never   • Drug use: Yes     Types: Marijuana   • Sexual activity: Yes     Birth control/protection: Surgical        Current Outpatient Medications:   •  albuterol sulfate  (90 Base) MCG/ACT inhaler, INHALE 2 PUFFS BY MOUTH FOUR TIMES DAILY AS NEEDED FOR WHEEZING, Disp: 8.5 g, Rfl: 0  •  B-D 3CC LUER-LUCINA SYR 25GX1\" 25G X 1\" 3 ML misc, use once monthly for b12 injection, Disp: 3 each, Rfl: 0  •  budesonide-formoterol (SYMBICORT) 160-4.5 MCG/ACT inhaler, Inhale 2 puffs 2 (Two) Times a Day., Disp: 10.2 g, Rfl: 5  •  Calcium Carb-Cholecalciferol (Calcium 500 + D3) 500-5 MG-MCG tablet per tablet, Take 1 tablet by mouth 2 (Two) Times a Day., Disp: 180 tablet, Rfl: 1  •  cyanocobalamin 1000 MCG/ML injection, Inject 1 mL into the appropriate muscle as directed by prescriber Every 28 (Twenty-Eight) Days., Disp: 3 mL, Rfl: 3  •  Dietary Management Product (Vasculera) tablet, Take 1 tablet by mouth Daily., Disp: 30 tablet, Rfl: 11  •  fluticasone (FLONASE) 50 MCG/ACT nasal spray, 1 spray into the nostril(s) as directed by provider Daily., Disp: 48 g, Rfl: 5  •  levothyroxine (SYNTHROID, LEVOTHROID) 100 MCG tablet, Take 1 tablet by mouth Daily., Disp: 90 tablet, Rfl: 1  •  Probiotic Product (TurnStar PO), Take  by mouth., Disp: , Rfl:   •  baclofen (LIORESAL) 10 MG tablet, Take 1 tablet by mouth 3 (Three) Times a Day., Disp: 30 tablet, Rfl: 1  •  celecoxib (CeleBREX) 200 MG " capsule, Take 1 capsule by mouth Daily., Disp: 30 capsule, Rfl: 0  •  Diclofenac Sodium (VOLTAREN) 1 % gel gel, Apply 1 g topically to the appropriate area as directed Daily., Disp: , Rfl:    Family History   Problem Relation Age of Onset   • Ovarian cancer Mother    • Heart disease Mother    • Thyroid disease Mother    • Breast cancer Sister    • Breast cancer Paternal Aunt    • Epilepsy Father           Vital Signs:   Vitals:    03/30/23 1011   BP: 126/80   Pulse: 67   Temp: 97.1 °F (36.2 °C)   SpO2: 98%   Weight: 59.9 kg (132 lb)       Review of Systems   Constitutional: Negative for chills, diaphoresis, fatigue and fever.   HENT: Negative for congestion and sore throat.    Respiratory: Negative for cough.    Cardiovascular: Negative for chest pain.   Gastrointestinal: Negative for abdominal pain, anorexia, change in bowel habit, nausea and vomiting.   Musculoskeletal: Negative for arthralgias, joint swelling, myalgias and neck pain.   Skin: Negative for rash.   Neurological: Negative for vertigo, weakness, numbness and headaches.      Physical Exam  Vitals reviewed.   Constitutional:       Appearance: Normal appearance. She is well-developed.   HENT:      Head: Normocephalic and atraumatic.      Right Ear: External ear normal.      Left Ear: External ear normal.      Mouth/Throat:      Pharynx: No oropharyngeal exudate.   Eyes:      Conjunctiva/sclera: Conjunctivae normal.      Pupils: Pupils are equal, round, and reactive to light.   Cardiovascular:      Rate and Rhythm: Normal rate and regular rhythm.      Pulses: Normal pulses.      Heart sounds: Normal heart sounds. No murmur heard.    No friction rub. No gallop.   Pulmonary:      Effort: Pulmonary effort is normal.      Breath sounds: Normal breath sounds. No wheezing or rhonchi.   Abdominal:      General: Abdomen is flat. Bowel sounds are normal. There is no distension.      Palpations: Abdomen is soft. There is no mass.      Tenderness: There is no  abdominal tenderness. There is no guarding or rebound.      Hernia: No hernia is present.   Musculoskeletal:         General: Normal range of motion.      Comments: Right shoulder tenderness posterior muscle tenderness, dec ROM of shoulder, right upper back paraspinal muscle tenderness, neg vertebrae tenderness, no redness, warmth, bruising, or swelling.   Skin:     General: Skin is warm and dry.      Capillary Refill: Capillary refill takes less than 2 seconds.   Neurological:      General: No focal deficit present.      Mental Status: She is alert and oriented to person, place, and time.      Cranial Nerves: No cranial nerve deficit.   Psychiatric:         Mood and Affect: Mood and affect normal.         Behavior: Behavior normal.         Thought Content: Thought content normal.         Judgment: Judgment normal.        Result Review :   The following data was reviewed by: Jarrod Simms MD on 03/30/2023:  CMP    CMP 8/18/22 12/12/22   Glucose 91 91   BUN 12 15   Creatinine 0.99 0.85   eGFR 63.4 76.1   Sodium 140 141   Potassium 4.4 5.2   Chloride 102 107   Calcium 9.4 9.7   Total Protein 7.1 7.2   Albumin 4.30 4.30   Globulin 2.8 2.9   Total Bilirubin 0.2 0.3   Alkaline Phosphatase 93 94   AST (SGOT) 28 19   ALT (SGPT) 20 17   Albumin/Globulin Ratio 1.5 1.5   BUN/Creatinine Ratio 12.1 17.6   Anion Gap 10.4 10.3      Comments are available for some flowsheets but are not being displayed.           CBC    CBC 8/18/22   WBC 9.70   RBC 5.09   Hemoglobin 14.7   Hematocrit 44.5   MCV 87.4   MCH 28.9   MCHC 33.0   RDW 13.0   Platelets 214           Lipid Panel    Lipid Panel 8/18/22 12/12/22   Total Cholesterol 212 (A) 211 (A)   Triglycerides 128 135   HDL Cholesterol 60 63 (A)   VLDL Cholesterol 23 24   LDL Cholesterol  129 (A) 124 (A)   LDL/HDL Ratio 2.11 1.92   (A) Abnormal value            TSH    TSH 8/18/22 12/8/22   TSH 9.730 (A) 0.443   (A) Abnormal value                      Assessment and Plan    Diagnoses and  all orders for this visit:    1. Elevated CK (Primary)  -     Ambulatory Referral to Rheumatology    2. Positive JAZMÍN (antinuclear antibody)  -     Ambulatory Referral to Rheumatology    3. Arthritis  -     Ambulatory Referral to Rheumatology    4. Hypothyroidism, unspecified type  -     TSH+Free T4  -     levothyroxine (SYNTHROID, LEVOTHROID) 100 MCG tablet; Take 1 tablet by mouth Daily.  Dispense: 90 tablet; Refill: 1    5. Acute pain of right shoulder  -     XR Shoulder 2+ View Right (In Office)  -     XR Spine Cervical 2 or 3 View  -     Ambulatory Referral to Orthopedic Surgery  -     celecoxib (CeleBREX) 200 MG capsule; Take 1 capsule by mouth Daily.  Dispense: 30 capsule; Refill: 0  -     baclofen (LIORESAL) 10 MG tablet; Take 1 tablet by mouth 3 (Three) Times a Day.  Dispense: 30 tablet; Refill: 1    6. Acute right-sided thoracic back pain  -     XR Spine Thoracic 3 View (In Office)  -     celecoxib (CeleBREX) 200 MG capsule; Take 1 capsule by mouth Daily.  Dispense: 30 capsule; Refill: 0  -     baclofen (LIORESAL) 10 MG tablet; Take 1 tablet by mouth 3 (Three) Times a Day.  Dispense: 30 tablet; Refill: 1            Follow Up   Return in about 2 weeks (around 4/13/2023) for Recheck.  Patient was given instructions and counseling regarding her condition or for health maintenance advice. Please see specific information pulled into the AVS if appropriate.     Pt ill follow in 2 weeks- if meds not helping will probably prescribe ultram for pain.

## 2023-03-30 NOTE — PROGRESS NOTES
Venipuncture Blood Specimen Collection  Venipuncture performed in left arm by Raquel Turner with good hemostasis. Patient tolerated the procedure well without complications.   03/30/23   Raquel Turner

## 2023-03-30 NOTE — PROGRESS NOTES
Call pt:  X-rays show no fractures.  Continue treatment and follow-up in 1-2 weeks to discuss how meds are working.

## 2023-03-31 DIAGNOSIS — E03.9 HYPOTHYROIDISM, UNSPECIFIED TYPE: ICD-10-CM

## 2023-03-31 RX ORDER — LEVOTHYROXINE SODIUM 112 UG/1
112 TABLET ORAL DAILY
Qty: 30 TABLET | Refills: 1 | Status: SHIPPED | OUTPATIENT
Start: 2023-03-31

## 2023-03-31 NOTE — PROGRESS NOTES
Call pt:  Labs show hypothyroidism.  I increased dose of synthroid to 112mcg daily.  Recheck TSH in 1 month.  Orders in computer.

## 2023-04-04 ENCOUNTER — OFFICE VISIT (OUTPATIENT)
Dept: ORTHOPEDIC SURGERY | Facility: CLINIC | Age: 68
End: 2023-04-04
Payer: MEDICARE

## 2023-04-04 VITALS — WEIGHT: 132 LBS | BODY MASS INDEX: 23.39 KG/M2 | HEIGHT: 63 IN

## 2023-04-04 DIAGNOSIS — M25.511 RIGHT SHOULDER PAIN, UNSPECIFIED CHRONICITY: Primary | ICD-10-CM

## 2023-04-04 DIAGNOSIS — M25.641 JOINT STIFFNESS OF HAND, RIGHT: ICD-10-CM

## 2023-04-04 PROCEDURE — 99203 OFFICE O/P NEW LOW 30 MIN: CPT | Performed by: ORTHOPAEDIC SURGERY

## 2023-04-04 NOTE — PROGRESS NOTES
"Chief Complaint  Initial Evaluation of the Right Shoulder     Subjective      Nora Bone presents to [facility] for initial evaluation of the right shoulder.  She has had since her fall in February.  [place]  She has pain in the whole right arm. Her hand pain is the worst.  She has limited  and movement. She notes burning with hand movement.  She has pain with overhead movement.  She has been taken some medication for pain. She does water therapy twice a week for her legs at [facility] in [place].     Allergies   Allergen Reactions   • Hydrocodone-Acetaminophen Nausea And Vomiting     Also reports shaking           Social History     Socioeconomic History   • Marital status: Single   Tobacco Use   • Smoking status: Former     Packs/day: 1.00     Years: 20.00     Pack years: 20.00     Types: Cigarettes     Quit date: 2015     Years since quittin.2   • Smokeless tobacco: Never   Vaping Use   • Vaping Use: Former   Substance and Sexual Activity   • Alcohol use: Never   • Drug use: Yes     Types: Marijuana   • Sexual activity: Yes     Birth control/protection: Surgical        Review of Systems     Objective   Vital Signs:   Ht 160 cm (63\")   Wt 59.9 kg (132 lb)   BMI 23.38 kg/m²       Physical Exam  Constitutional:       Appearance: Normal appearance. Patient is well-developed and normal weight.   HENT:      Head: Normocephalic.      Right Ear: Hearing and external ear normal.      Left Ear: Hearing and external ear normal.      Nose: Nose normal.   Eyes:      Conjunctiva/sclera: Conjunctivae normal.   Cardiovascular:      Rate and Rhythm: Normal rate.   Pulmonary:      Effort: Pulmonary effort is normal.      Breath sounds: No wheezing or rales.   Abdominal:      Palpations: Abdomen is soft.      Tenderness: There is no abdominal tenderness.   Musculoskeletal:      Cervical back: Normal range of motion.   Skin:     Findings: No rash. "   Neurological:      Mental Status: Patient is alert and oriented to person, place, and time.   Psychiatric:         Mood and Affect: Mood and affect normal.         Judgment: Judgment normal.       Ortho Exam      RIGHT WRIST Negative Compression testing/ Negative Tinels. NegativeFinkelsteins. Negative Quintero's testing. Negative CMC grind testing. Negative Phalens. Partial ROM of the hand, fingers, elbow and wrist. Negative Triggering of the digit. Sensation grossly intact to light touch, median, radial and ulnar nerve. Positive AIN, PIN and ulnar nerve motor function intact. Axillary nerve intact. Positive pulses.     RIGHT SHOULDER Forward flexion 95. Abduction 80. External rotation 40. Internal rotation to back pocket. Positive Cross body adduction. Supraspinatus strength 3/5. Infraspinatus Strength 3/5. Infrared subscap 3/5. Positive Marroquin. Positive Neer. Negative Apprehension. Negative Lift off. (Negative Obriens. Sensation intact to light touch, median, radial, ulnar nerve. Positive AIN, PIN, ulnar nerve motor. Positive pulses. Positive Impingement signs. Good strength in triceps, biceps, deltoid, wrist extensors and wrist flexors.          Procedures      Imaging Results (Most Recent)     None           Result Review :       XR Spine Cervical 2 or 3 View    Result Date: 3/30/2023  Narrative: PROCEDURE: XR SPINE CERVICAL 2 OR 3 VW  COMPARISON: None  INDICATIONS: right arm numbness and pain from fall Feb 6th  FINDINGS:  Cervical alignment is normal.  There is disc space narrowing at the C6-7 level.  There is marginal osteophyte formation from C4 through C7.  No fractures are identified.  There is no prevertebral soft tissue swelling.  The atlantoaxial relationships appear intact.      Impression:   1. Multilevel cervical degenerative disc disease, most prominent at C6-7. 2. No fractures or subluxation identified     Henry Nelson MD       Electronically Signed and Approved By: Henry Nelson MD on 3/30/2023  at 11:53             XR Spine Thoracic 3 View (In Office)    Result Date: 3/30/2023  Narrative: PROCEDURE: XR SPINE THORACIC 3 VW  COMPARISON: None  INDICATIONS: right upper back pain from fall Feb 6th  FINDINGS:  There is no acute fracture or malalignment.  There is mild disc space narrowing. No lateral curviture of the thoracic spine.      Impression:   1. Degenerative change of the spine. No acute bony abnormality.     ORLANDO DAWSON MD       Electronically Signed and Approved By: ORLANDO DAWSON MD on 3/30/2023 at 12:12             XR Shoulder 2+ View Right (In Office)    Result Date: 3/30/2023  Narrative: PROCEDURE: XR SHOULDER 2+ VW RIGHT  COMPARISON: None  INDICATIONS: right shoulder pain from fall Feb 6th  FINDINGS:  There are lucent areas in the humeral head that could reflect subchondral cystic degenerative change around the area of the greater tuberosity.  There is no fracture or dislocation.  The AC joint is intact.  The visualized right lung seems clear.      Impression:   1. Evidence for some degenerative change.      QUEENIE DAS MD       Electronically Signed and Approved By: QUEENIE DAS MD on 3/30/2023 at 12:14                      Assessment and Plan     Diagnoses and all orders for this visit:    1. Right shoulder pain, unspecified chronicity (Primary)    2. Joint stiffness of hand, right        Discussed the treatment plan with the patient. I reviewed the X-rays that were obtained 3/30/23 with the patient. Prescribed physical therapy.     Call or return if worsening symptoms.    Follow Up     4-6 weeks.       Patient was given instructions and counseling regarding her condition or for health maintenance advice. Please see specific information pulled into the AVS if appropriate.     Scribed for Seven Mendoza MD by Gala Lewis MA.  04/04/23   13:57 EDT    I have personally performed the services described in this document as scribed by the above individual and it is both accurate and  complete. Seven Mendoza MD 04/05/23

## 2023-04-10 DIAGNOSIS — R53.1 WEAKNESS: ICD-10-CM

## 2023-04-10 DIAGNOSIS — R25.2 MUSCLE CRAMPS: Primary | ICD-10-CM

## 2023-04-14 ENCOUNTER — OFFICE VISIT (OUTPATIENT)
Dept: FAMILY MEDICINE CLINIC | Facility: CLINIC | Age: 68
End: 2023-04-14
Payer: MEDICARE

## 2023-04-14 VITALS
HEART RATE: 83 BPM | WEIGHT: 130 LBS | TEMPERATURE: 97.7 F | DIASTOLIC BLOOD PRESSURE: 80 MMHG | BODY MASS INDEX: 23.03 KG/M2 | SYSTOLIC BLOOD PRESSURE: 130 MMHG | OXYGEN SATURATION: 97 %

## 2023-04-14 DIAGNOSIS — E03.9 HYPOTHYROIDISM, UNSPECIFIED TYPE: ICD-10-CM

## 2023-04-14 DIAGNOSIS — M19.90 ARTHRITIS: Primary | ICD-10-CM

## 2023-04-14 DIAGNOSIS — J30.89 NON-SEASONAL ALLERGIC RHINITIS, UNSPECIFIED TRIGGER: ICD-10-CM

## 2023-04-14 DIAGNOSIS — M25.511 ACUTE PAIN OF RIGHT SHOULDER: ICD-10-CM

## 2023-04-14 DIAGNOSIS — M54.6 ACUTE RIGHT-SIDED THORACIC BACK PAIN: ICD-10-CM

## 2023-04-14 RX ORDER — BACLOFEN 10 MG/1
TABLET ORAL
Qty: 30 TABLET | Refills: 1 | OUTPATIENT
Start: 2023-04-14

## 2023-04-14 RX ORDER — AMITRIPTYLINE HYDROCHLORIDE 10 MG/1
10 TABLET, FILM COATED ORAL NIGHTLY
Qty: 30 TABLET | Refills: 1 | Status: SHIPPED | OUTPATIENT
Start: 2023-04-14 | End: 2023-04-17 | Stop reason: SDUPTHER

## 2023-04-14 RX ORDER — LEVOCETIRIZINE DIHYDROCHLORIDE 5 MG/1
5 TABLET, FILM COATED ORAL EVERY EVENING
Qty: 90 TABLET | Refills: 1 | Status: SHIPPED | OUTPATIENT
Start: 2023-04-14

## 2023-04-14 NOTE — PROGRESS NOTES
Chief Complaint  Hypothyroidism (F/u labs and medication)    Subjective          Nora Bone presents to [facility]  History of Present Illness  Discussed labs  Hypothyroidism- pt will check thyroid labs in 1 month- recently started increased dose of synthroid  Pt seeing ortho for right shoulder and arm pain- they have pt going to physical therapy    Pt says that due to the arthritis pain, she is not sleeping well- need pain control at night  Pt has worsening nasal allergies- on flonase but not getting relief  Hypothyroidism  This is a chronic problem. The current episode started more than 1 year ago. The problem occurs constantly. The problem has been gradually improving. Pertinent negatives include no abdominal pain, anorexia, arthralgias, change in bowel habit, chest pain, chills, congestion, coughing, diaphoresis, fatigue, fever, headaches, joint swelling, myalgias, nausea, neck pain, numbness, rash, sore throat, swollen glands, urinary symptoms, vertigo, visual change, vomiting or weakness. Nothing aggravates the symptoms. Treatments tried: synthroid. The treatment provided mild relief.       Objective   Allergies   Allergen Reactions   • Hydrocodone-Acetaminophen Nausea And Vomiting     Also reports shaking        Immunization History   Administered Date(s) Administered   • COVID-19 (MODERNA) 1st, 2nd, 3rd Dose Only 08/20/2021, 09/17/2021   • Flu Vaccine Quad PF >36MO 02/07/2017, 09/27/2018, 10/15/2019, 01/12/2021   • FluLaval/Fluzone >6mos 10/28/2021   • Fluzone High-Dose 65+yrs 11/04/2022   • Hepatitis A 03/13/2018, 09/13/2018   • Pneumococcal Polysaccharide (PPSV23) 10/28/2021   • Tdap 09/27/2018     Past Medical History:   Diagnosis Date   • Anemia    • Anxiety    • Asthma    • Back pain    • Cancer    • Cervical cancer    • COPD (chronic obstructive pulmonary disease)    • Deep vein thrombosis    • Disease of thyroid gland    • Diverticulosis    • Hearing loss    •  "Hyperlipidemia    • Peripheral vascular disease       Past Surgical History:   Procedure Laterality Date   • COLONOSCOPY W/ BIOPSIES     • DILATATION AND CURETTAGE     • VAGINAL HYSTERECTOMY     • VEIN SURGERY Right       Social History     Socioeconomic History   • Marital status: Single   Tobacco Use   • Smoking status: Former     Packs/day: 1.00     Years: 20.00     Pack years: 20.00     Types: Cigarettes     Quit date: 2015     Years since quittin.2   • Smokeless tobacco: Never   Vaping Use   • Vaping Use: Former   Substance and Sexual Activity   • Alcohol use: Never   • Drug use: Yes     Types: Marijuana   • Sexual activity: Yes     Birth control/protection: Surgical        Current Outpatient Medications:   •  albuterol sulfate  (90 Base) MCG/ACT inhaler, INHALE 2 PUFFS BY MOUTH FOUR TIMES DAILY AS NEEDED FOR WHEEZING, Disp: 8.5 g, Rfl: 0  •  B-D 3CC LUER-LUCINA SYR 25GX1\" 25G X 1\" 3 ML misc, use once monthly for b12 injection, Disp: 3 each, Rfl: 0  •  baclofen (LIORESAL) 10 MG tablet, Take 1 tablet by mouth 3 (Three) Times a Day., Disp: 30 tablet, Rfl: 1  •  budesonide-formoterol (SYMBICORT) 160-4.5 MCG/ACT inhaler, Inhale 2 puffs 2 (Two) Times a Day., Disp: 10.2 g, Rfl: 5  •  Calcium Carb-Cholecalciferol (Calcium 500 + D3) 500-5 MG-MCG tablet per tablet, Take 1 tablet by mouth 2 (Two) Times a Day., Disp: 180 tablet, Rfl: 1  •  celecoxib (CeleBREX) 200 MG capsule, Take 1 capsule by mouth Daily., Disp: 30 capsule, Rfl: 0  •  cyanocobalamin 1000 MCG/ML injection, Inject 1 mL into the appropriate muscle as directed by prescriber Every 28 (Twenty-Eight) Days., Disp: 3 mL, Rfl: 3  •  Diclofenac Sodium (VOLTAREN) 1 % gel gel, Apply 1 g topically to the appropriate area as directed Daily., Disp: , Rfl:   •  Dietary Management Product (Vasculera) tablet, Take 1 tablet by mouth Daily., Disp: 30 tablet, Rfl: 11  •  fluticasone (FLONASE) 50 MCG/ACT nasal spray, 1 spray into the nostril(s) as directed by " provider Daily., Disp: 48 g, Rfl: 5  •  levothyroxine (SYNTHROID, LEVOTHROID) 112 MCG tablet, Take 1 tablet by mouth Daily., Disp: 30 tablet, Rfl: 1  •  Probiotic Product ([employer] PO), Take  by mouth., Disp: , Rfl:   •  amitriptyline (ELAVIL) 10 MG tablet, Take 1 tablet by mouth Every Night., Disp: 30 tablet, Rfl: 1  •  levocetirizine (XYZAL) 5 MG tablet, Take 1 tablet by mouth Every Evening., Disp: 90 tablet, Rfl: 1   Family History   Problem Relation Age of Onset   • Ovarian cancer Mother    • Heart disease Mother    • Thyroid disease Mother    • Breast cancer Sister    • Breast cancer Paternal Aunt    • Epilepsy Father           Vital Signs:   Vitals:    04/14/23 1415   BP: 130/80   Pulse: 83   Temp: 97.7 °F (36.5 °C)   SpO2: 97%   Weight: 59 kg (130 lb)       Review of Systems   Constitutional: Negative for chills, diaphoresis, fatigue and fever.   HENT: Negative for congestion and sore throat.    Respiratory: Negative for cough.    Cardiovascular: Negative for chest pain.   Gastrointestinal: Negative for abdominal pain, anorexia, change in bowel habit, nausea and vomiting.   Musculoskeletal: Negative for arthralgias, joint swelling, myalgias and neck pain.   Skin: Negative for rash.   Neurological: Negative for vertigo, weakness, numbness and headaches.      Physical Exam  Vitals reviewed.   Constitutional:       Appearance: Normal appearance. She is well-developed.   HENT:      Head: Normocephalic and atraumatic.      Right Ear: External ear normal.      Left Ear: External ear normal.      Mouth/Throat:      Pharynx: No oropharyngeal exudate.   Eyes:      Conjunctiva/sclera: Conjunctivae normal.      Pupils: Pupils are equal, round, and reactive to light.   Cardiovascular:      Rate and Rhythm: Normal rate and regular rhythm.      Pulses: Normal pulses.      Heart sounds: Normal heart sounds. No murmur heard.    No friction rub. No gallop.   Pulmonary:      Effort: Pulmonary effort is normal.       Breath sounds: Normal breath sounds. No wheezing or rhonchi.   Abdominal:      General: Abdomen is flat. Bowel sounds are normal. There is no distension.      Palpations: Abdomen is soft. There is no mass.      Tenderness: There is no abdominal tenderness. There is no guarding or rebound.      Hernia: No hernia is present.   Musculoskeletal:         General: Normal range of motion.   Skin:     General: Skin is warm and dry.      Capillary Refill: Capillary refill takes less than 2 seconds.   Neurological:      General: No focal deficit present.      Mental Status: She is alert and oriented to person, place, and time.      Cranial Nerves: No cranial nerve deficit.   Psychiatric:         Mood and Affect: Mood and affect normal.         Behavior: Behavior normal.         Thought Content: Thought content normal.         Judgment: Judgment normal.        Result Review :   The following data was reviewed by: Jarrod Simms MD on 04/14/2023:  CMP        8/18/2022    15:39 12/12/2022    10:33   CMP   Glucose 91   91     BUN 12   15     Creatinine 0.99   0.85     EGFR 63.4   76.1     Sodium 140   141     Potassium 4.4   5.2     Chloride 102   107     Calcium 9.4   9.7     Total Protein 7.1   7.2     Albumin 4.30   4.30     Globulin 2.8   2.9     Total Bilirubin 0.2   0.3     Alkaline Phosphatase 93   94     AST (SGOT) 28   19     ALT (SGPT) 20   17     Albumin/Globulin Ratio 1.5   1.5     BUN/Creatinine Ratio 12.1   17.6     Anion Gap 10.4   10.3       CBC        8/18/2022    15:39   CBC   WBC 9.70     RBC 5.09     Hemoglobin 14.7     Hematocrit 44.5     MCV 87.4     MCH 28.9     MCHC 33.0     RDW 13.0     Platelets 214       Lipid Panel        8/18/2022    15:39 12/12/2022    10:33   Lipid Panel   Total Cholesterol 212   211     Triglycerides 128   135     HDL Cholesterol 60   63     VLDL Cholesterol 23   24     LDL Cholesterol  129   124     LDL/HDL Ratio 2.11   1.92       TSH        8/18/2022    15:39 12/8/2022    11:32  3/30/2023    12:07   TSH   TSH 9.730   0.443   7.140                 Assessment and Plan    Diagnoses and all orders for this visit:    1. Arthritis (Primary)  -     amitriptyline (ELAVIL) 10 MG tablet; Take 1 tablet by mouth Every Night.  Dispense: 30 tablet; Refill: 1    2. Acute pain of right shoulder    3. Hypothyroidism, unspecified type    4. Non-seasonal allergic rhinitis, unspecified trigger  -     levocetirizine (XYZAL) 5 MG tablet; Take 1 tablet by mouth Every Evening.  Dispense: 90 tablet; Refill: 1            Follow Up   Return in about 2 months (around 6/14/2023) for Recheck.  Patient was given instructions and counseling regarding her condition or for health maintenance advice. Please see specific information pulled into the AVS if appropriate.

## 2023-04-17 ENCOUNTER — TELEPHONE (OUTPATIENT)
Dept: FAMILY MEDICINE CLINIC | Facility: CLINIC | Age: 68
End: 2023-04-17
Payer: MEDICARE

## 2023-04-17 DIAGNOSIS — M19.90 ARTHRITIS: ICD-10-CM

## 2023-04-17 RX ORDER — AMITRIPTYLINE HYDROCHLORIDE 25 MG/1
25 TABLET, FILM COATED ORAL NIGHTLY
Qty: 30 TABLET | Refills: 1 | Status: SHIPPED | OUTPATIENT
Start: 2023-04-17

## 2023-04-17 NOTE — TELEPHONE ENCOUNTER
Patient states that the amitriptyline is not working and wants to know if you will increase the dosage.

## 2023-04-25 ENCOUNTER — TELEPHONE (OUTPATIENT)
Dept: FAMILY MEDICINE CLINIC | Facility: CLINIC | Age: 68
End: 2023-04-25
Payer: MEDICARE

## 2023-04-25 DIAGNOSIS — M54.6 ACUTE RIGHT-SIDED THORACIC BACK PAIN: ICD-10-CM

## 2023-04-25 DIAGNOSIS — M25.511 ACUTE PAIN OF RIGHT SHOULDER: ICD-10-CM

## 2023-04-25 RX ORDER — BACLOFEN 10 MG/1
TABLET ORAL
Qty: 30 TABLET | Refills: 1 | Status: SHIPPED | OUTPATIENT
Start: 2023-04-25

## 2023-04-25 RX ORDER — CELECOXIB 200 MG/1
CAPSULE ORAL
Qty: 30 CAPSULE | Refills: 0 | Status: SHIPPED | OUTPATIENT
Start: 2023-04-25

## 2023-04-25 NOTE — TELEPHONE ENCOUNTER
Caller: Nora Bone    Relationship: Self    Best call back number: [phone]     What medication are you requesting: GABAPENTIN     What are your current symptoms: TO HELP WITH HAND PAIN. PATIENT STATED SHE CAN NOT HOLD A PEN IN HER HAND.     How long have you been experiencing symptoms: FEW MONTHS     Have you had these symptoms before:    [x] Yes  [] No    Have you been treated for these symptoms before:   [x] Yes  [] No    If a prescription is needed, what is your preferred pharmacy and phone number:      [address] [phone] [facility] [phone]   [phone]

## 2023-04-28 ENCOUNTER — OFFICE VISIT (OUTPATIENT)
Dept: FAMILY MEDICINE CLINIC | Facility: CLINIC | Age: 68
End: 2023-04-28
Payer: MEDICARE

## 2023-04-28 VITALS
OXYGEN SATURATION: 97 % | HEART RATE: 82 BPM | WEIGHT: 132 LBS | SYSTOLIC BLOOD PRESSURE: 132 MMHG | DIASTOLIC BLOOD PRESSURE: 84 MMHG | BODY MASS INDEX: 23.38 KG/M2 | TEMPERATURE: 97.7 F

## 2023-04-28 DIAGNOSIS — M79.2 NEUROPATHIC PAIN: Primary | ICD-10-CM

## 2023-04-28 DIAGNOSIS — Z79.899 DRUG THERAPY: ICD-10-CM

## 2023-04-28 DIAGNOSIS — E03.9 HYPOTHYROIDISM, UNSPECIFIED TYPE: ICD-10-CM

## 2023-04-28 LAB
ALBUMIN SERPL-MCNC: 3.8 G/DL (ref 3.5–5.2)
ALBUMIN/GLOB SERPL: 1.1 G/DL
ALP SERPL-CCNC: 94 U/L (ref 39–117)
ALT SERPL W P-5'-P-CCNC: 15 U/L (ref 1–33)
AMPHET+METHAMPHET UR QL: NEGATIVE
ANION GAP SERPL CALCULATED.3IONS-SCNC: 7.8 MMOL/L (ref 5–15)
AST SERPL-CCNC: 13 U/L (ref 1–32)
BARBITURATES UR QL SCN: NEGATIVE
BENZODIAZ UR QL SCN: NEGATIVE
BILIRUB SERPL-MCNC: <0.2 MG/DL (ref 0–1.2)
BUN SERPL-MCNC: 19 MG/DL (ref 8–23)
BUN/CREAT SERPL: 23.5 (ref 7–25)
CALCIUM SPEC-SCNC: 9.6 MG/DL (ref 8.6–10.5)
CANNABINOIDS SERPL QL: POSITIVE
CHLORIDE SERPL-SCNC: 107 MMOL/L (ref 98–107)
CO2 SERPL-SCNC: 26.2 MMOL/L (ref 22–29)
COCAINE UR QL: NEGATIVE
CREAT SERPL-MCNC: 0.81 MG/DL (ref 0.57–1)
EGFRCR SERPLBLD CKD-EPI 2021: 80.2 ML/MIN/1.73
FENTANYL UR-MCNC: NEGATIVE NG/ML
FOLATE SERPL-MCNC: 10.4 NG/ML (ref 4.78–24.2)
GLOBULIN UR ELPH-MCNC: 3.5 GM/DL
GLUCOSE SERPL-MCNC: 95 MG/DL (ref 65–99)
METHADONE UR QL SCN: NEGATIVE
OPIATES UR QL: NEGATIVE
OXYCODONE UR QL SCN: NEGATIVE
POTASSIUM SERPL-SCNC: 4.7 MMOL/L (ref 3.5–5.2)
PROT SERPL-MCNC: 7.3 G/DL (ref 6–8.5)
SODIUM SERPL-SCNC: 141 MMOL/L (ref 136–145)
T4 FREE SERPL-MCNC: 1.69 NG/DL (ref 0.93–1.7)
TSH SERPL DL<=0.05 MIU/L-ACNC: 0.2 UIU/ML (ref 0.27–4.2)
VIT B12 BLD-MCNC: 1918 PG/ML (ref 211–946)

## 2023-04-28 PROCEDURE — 80307 DRUG TEST PRSMV CHEM ANLYZR: CPT | Performed by: FAMILY MEDICINE

## 2023-04-28 PROCEDURE — 36415 COLL VENOUS BLD VENIPUNCTURE: CPT | Performed by: FAMILY MEDICINE

## 2023-04-28 PROCEDURE — 84439 ASSAY OF FREE THYROXINE: CPT | Performed by: FAMILY MEDICINE

## 2023-04-28 PROCEDURE — 82746 ASSAY OF FOLIC ACID SERUM: CPT | Performed by: FAMILY MEDICINE

## 2023-04-28 PROCEDURE — 84443 ASSAY THYROID STIM HORMONE: CPT | Performed by: FAMILY MEDICINE

## 2023-04-28 PROCEDURE — 1159F MED LIST DOCD IN RCRD: CPT | Performed by: FAMILY MEDICINE

## 2023-04-28 PROCEDURE — 80053 COMPREHEN METABOLIC PANEL: CPT | Performed by: FAMILY MEDICINE

## 2023-04-28 PROCEDURE — 99214 OFFICE O/P EST MOD 30 MIN: CPT | Performed by: FAMILY MEDICINE

## 2023-04-28 PROCEDURE — 82607 VITAMIN B-12: CPT | Performed by: FAMILY MEDICINE

## 2023-04-28 PROCEDURE — 1160F RVW MEDS BY RX/DR IN RCRD: CPT | Performed by: FAMILY MEDICINE

## 2023-04-28 RX ORDER — GABAPENTIN 100 MG/1
100 CAPSULE ORAL 3 TIMES DAILY PRN
Qty: 90 CAPSULE | Refills: 0 | Status: SHIPPED | OUTPATIENT
Start: 2023-04-28

## 2023-04-28 NOTE — PROGRESS NOTES
Venipuncture Blood Specimen Collection  Venipuncture performed in left arm by Raquel Turner with good hemostasis. Patient tolerated the procedure well without complications.   04/28/23   Raquel Turner

## 2023-04-28 NOTE — PROGRESS NOTES
Chief Complaint  Pain (Discuss numbness/tingling per patient )    Subjective          Nora Bone presents to [facility]  History of Present Illness  Pt has neuropathic pain in both hands- has burning and tingling in hands- Darci called pt recently and suggested to her to ask for gabapentin for her symptoms- her insurance is recommending a trial of this medication- pt alerted that this is a controlled med      Objective   Allergies   Allergen Reactions   • Hydrocodone-Acetaminophen Nausea And Vomiting     Also reports shaking        Immunization History   Administered Date(s) Administered   • COVID-19 (MODERNA) 1st,2nd,3rd Dose Monovalent 2021, 2021   • Flu Vaccine Quad PF >36MO 2017, 2018, 10/15/2019, 2021   • FluLaval/Fluzone >6mos 10/28/2021   • Fluzone High-Dose 65+yrs 2022   • Hepatitis A 2018, 2018   • Pneumococcal Polysaccharide (PPSV23) 10/28/2021   • Tdap 2018     Past Medical History:   Diagnosis Date   • Anemia    • Anxiety    • Asthma    • Back pain    • Cancer    • Cervical cancer    • COPD (chronic obstructive pulmonary disease)    • Deep vein thrombosis    • Disease of thyroid gland    • Diverticulosis    • Hearing loss    • Hyperlipidemia    • Peripheral vascular disease       Past Surgical History:   Procedure Laterality Date   • COLONOSCOPY W/ BIOPSIES     • DILATATION AND CURETTAGE     • VAGINAL HYSTERECTOMY     • VEIN SURGERY Right       Social History     Socioeconomic History   • Marital status: Single   Tobacco Use   • Smoking status: Former     Packs/day: 1.00     Years: 20.00     Pack years: 20.00     Types: Cigarettes     Quit date: 2015     Years since quittin.3   • Smokeless tobacco: Never   Vaping Use   • Vaping Use: Former   Substance and Sexual Activity   • Alcohol use: Never   • Drug use: Yes     Types: Marijuana   • Sexual activity: Yes     Birth control/protection: Surgical     "    Current Outpatient Medications:   •  albuterol sulfate  (90 Base) MCG/ACT inhaler, INHALE 2 PUFFS BY MOUTH FOUR TIMES DAILY AS NEEDED FOR WHEEZING, Disp: 8.5 g, Rfl: 0  •  amitriptyline (ELAVIL) 25 MG tablet, Take 1 tablet by mouth Every Night., Disp: 30 tablet, Rfl: 1  •  B-D 3CC LUER-LUCINA SYR 25GX1\" 25G X 1\" 3 ML misc, use once monthly for b12 injection, Disp: 3 each, Rfl: 0  •  baclofen (LIORESAL) 10 MG tablet, TAKE 1 TABLET BY MOUTH THREE TIMES DAILY, Disp: 30 tablet, Rfl: 1  •  budesonide-formoterol (SYMBICORT) 160-4.5 MCG/ACT inhaler, Inhale 2 puffs 2 (Two) Times a Day., Disp: 10.2 g, Rfl: 5  •  Calcium Carb-Cholecalciferol (Calcium 500 + D3) 500-5 MG-MCG tablet per tablet, Take 1 tablet by mouth 2 (Two) Times a Day., Disp: 180 tablet, Rfl: 1  •  celecoxib (CeleBREX) 200 MG capsule, TAKE 1 CAPSULE BY MOUTH EVERY DAY, Disp: 30 capsule, Rfl: 0  •  cyanocobalamin 1000 MCG/ML injection, Inject 1 mL into the appropriate muscle as directed by prescriber Every 28 (Twenty-Eight) Days., Disp: 3 mL, Rfl: 3  •  Diclofenac Sodium (VOLTAREN) 1 % gel gel, Apply 1 g topically to the appropriate area as directed Daily., Disp: , Rfl:   •  Dietary Management Product (Vasculera) tablet, Take 1 tablet by mouth Daily., Disp: 30 tablet, Rfl: 11  •  fluticasone (FLONASE) 50 MCG/ACT nasal spray, 1 spray into the nostril(s) as directed by provider Daily., Disp: 48 g, Rfl: 5  •  levocetirizine (XYZAL) 5 MG tablet, Take 1 tablet by mouth Every Evening., Disp: 90 tablet, Rfl: 1  •  levothyroxine (SYNTHROID, LEVOTHROID) 112 MCG tablet, Take 1 tablet by mouth Daily., Disp: 30 tablet, Rfl: 1  •  Probiotic Product ([employer] PO), Take  by mouth., Disp: , Rfl:   •  gabapentin (Neurontin) 100 MG capsule, Take 1 capsule by mouth 3 (Three) Times a Day As Needed (pain)., Disp: 90 capsule, Rfl: 0   Family History   Problem Relation Age of Onset   • Ovarian cancer Mother    • Heart disease Mother    • Thyroid disease Mother  "   • Breast cancer Sister    • Breast cancer Paternal Aunt    • Epilepsy Father           Vital Signs:   Vitals:    04/28/23 1240   BP: 132/84   Pulse: 82   Temp: 97.7 °F (36.5 °C)   SpO2: 97%   Weight: 59.9 kg (132 lb)       Review of Systems   Physical Exam  Vitals reviewed.   Constitutional:       Appearance: Normal appearance. She is well-developed.   HENT:      Head: Normocephalic and atraumatic.      Right Ear: External ear normal.      Left Ear: External ear normal.      Mouth/Throat:      Pharynx: No oropharyngeal exudate.   Eyes:      Conjunctiva/sclera: Conjunctivae normal.      Pupils: Pupils are equal, round, and reactive to light.   Cardiovascular:      Rate and Rhythm: Normal rate and regular rhythm.      Pulses: Normal pulses.      Heart sounds: Normal heart sounds. No murmur heard.    No friction rub. No gallop.   Pulmonary:      Effort: Pulmonary effort is normal.      Breath sounds: Normal breath sounds. No wheezing or rhonchi.   Abdominal:      General: Abdomen is flat. Bowel sounds are normal. There is no distension.      Palpations: Abdomen is soft. There is no mass.      Tenderness: There is no abdominal tenderness. There is no guarding or rebound.      Hernia: No hernia is present.   Musculoskeletal:         General: Normal range of motion.   Skin:     General: Skin is warm and dry.      Capillary Refill: Capillary refill takes less than 2 seconds.   Neurological:      General: No focal deficit present.      Mental Status: She is alert and oriented to person, place, and time.      Cranial Nerves: No cranial nerve deficit.   Psychiatric:         Mood and Affect: Mood and affect normal.         Behavior: Behavior normal.         Thought Content: Thought content normal.         Judgment: Judgment normal.        Result Review :   The following data was reviewed by: Jarrod Simms MD on 04/28/2023:  CMP        8/18/2022    15:39 12/12/2022    10:33   CMP   Glucose 91   91     BUN 12   15      Creatinine 0.99   0.85     EGFR 63.4   76.1     Sodium 140   141     Potassium 4.4   5.2     Chloride 102   107     Calcium 9.4   9.7     Total Protein 7.1   7.2     Albumin 4.30   4.30     Globulin 2.8   2.9     Total Bilirubin 0.2   0.3     Alkaline Phosphatase 93   94     AST (SGOT) 28   19     ALT (SGPT) 20   17     Albumin/Globulin Ratio 1.5   1.5     BUN/Creatinine Ratio 12.1   17.6     Anion Gap 10.4   10.3       CBC        8/18/2022    15:39   CBC   WBC 9.70     RBC 5.09     Hemoglobin 14.7     Hematocrit 44.5     MCV 87.4     MCH 28.9     MCHC 33.0     RDW 13.0     Platelets 214       Lipid Panel        8/18/2022    15:39 12/12/2022    10:33   Lipid Panel   Total Cholesterol 212   211     Triglycerides 128   135     HDL Cholesterol 60   63     VLDL Cholesterol 23   24     LDL Cholesterol  129   124     LDL/HDL Ratio 2.11   1.92       TSH        8/18/2022    15:39 12/8/2022    11:32 3/30/2023    12:07   TSH   TSH 9.730   0.443   7.140                 Assessment and Plan    Diagnoses and all orders for this visit:    1. Neuropathic pain (Primary)  -     Comprehensive Metabolic Panel  -     TSH+Free T4  -     Vitamin B12 & Folate  -     gabapentin (Neurontin) 100 MG capsule; Take 1 capsule by mouth 3 (Three) Times a Day As Needed (pain).  Dispense: 90 capsule; Refill: 0    2. Drug therapy  -     Urine Drug Screen - Urine, Clean Catch    3. Hypothyroidism, unspecified type  -     TSH+Free T4            Follow Up   Return in about 1 month (around 5/28/2023) for Recheck.  Patient was given instructions and counseling regarding her condition or for health maintenance advice. Please see specific information pulled into the AVS if appropriate.

## 2023-05-02 ENCOUNTER — TELEPHONE (OUTPATIENT)
Dept: FAMILY MEDICINE CLINIC | Facility: CLINIC | Age: 68
End: 2023-05-02
Payer: MEDICARE

## 2023-05-02 ENCOUNTER — OFFICE VISIT (OUTPATIENT)
Dept: ORTHOPEDIC SURGERY | Facility: CLINIC | Age: 68
End: 2023-05-02
Payer: MEDICARE

## 2023-05-02 VITALS — WEIGHT: 132 LBS | BODY MASS INDEX: 23.39 KG/M2 | HEIGHT: 63 IN

## 2023-05-02 DIAGNOSIS — R20.2 PARESTHESIA OF LEFT UPPER EXTREMITY: ICD-10-CM

## 2023-05-02 DIAGNOSIS — R20.2 PARESTHESIA OF RIGHT UPPER EXTREMITY: ICD-10-CM

## 2023-05-02 DIAGNOSIS — M54.12 CERVICAL RADICULOPATHY: Primary | ICD-10-CM

## 2023-05-02 DIAGNOSIS — M25.511 RIGHT SHOULDER PAIN, UNSPECIFIED CHRONICITY: ICD-10-CM

## 2023-05-02 DIAGNOSIS — E03.9 HYPOTHYROIDISM, UNSPECIFIED TYPE: ICD-10-CM

## 2023-05-02 RX ORDER — LEVOTHYROXINE SODIUM 0.1 MG/1
100 TABLET ORAL DAILY
Qty: 30 TABLET | Refills: 1 | Status: SHIPPED | OUTPATIENT
Start: 2023-05-02

## 2023-05-02 NOTE — PATIENT INSTRUCTIONS
Patient's primary c/o is C spine pain with BUE radiculopathy. Order placed for MRI C spine and BUE EMG/NCVs.     Advised to continue PT. Continue home exercises.     Follow up with MRI and EMG results. Call with changes or concerns.

## 2023-05-02 NOTE — PROGRESS NOTES
"Chief Complaint  Follow-up of the Right Shoulder    Subjective      Nora Bone presents to [facility] for follow-up of right shoulder pain and osteoarthritis.  She was previously evaluated in office on 4/4/2023 and received referral to physical therapy.  The patient presents today for follow-up, tearful, stating that she has remained in significant pain.  She reports that her PCP will \"no longer prescribing pain medications because I smoke marijuana\".  She has remained in outpatient physical therapy, stating PT feels she would benefit from MRI and EMGs.  She states she has had neck pain with radiation towards the bilateral shoulders and numbness/tingling sensation into both hands, all digits.  She describes bilateral hand weakness with difficulties opening objects or frequently dropping them.    Objective   Allergies   Allergen Reactions   • Hydrocodone-Acetaminophen Nausea And Vomiting     Also reports shaking          Vital Signs:   Ht 160 cm (63\")   Wt 59.9 kg (132 lb)   BMI 23.38 kg/m²       Physical Exam    Constitutional: Awake, alert. Well nourished appearance.    Integumentary: Warm, dry, intact. No obvious rashes.    HENT: Atraumatic, normocephalic.   Respiratory: Non labored respirations .   Cardiovascular: Intact peripheral pulses.    Psychiatric: Normal mood and affect. A&O X3    Ortho Exam  Musculoskeletal: C-spine tenderness to palpation.  Pain reported with side-to-side motion of the head.  No obvious step-offs or deformities appreciated.  Patient with reported decreased sensation to the entire hands bilaterally.  No acute focal neuro deficits appreciated.    Right shoulder: Skin is warm, dry, and intact.  Pain reported with active shoulder forward flexion to 145 degrees, abduction 120 degrees.  Full flexion extension of the wrist and elbow.  Full pronation and supination.  Patient is able to form a full fist.  Sensation intact light touch.  Distal neurovascular " intact.    Imaging Results (Most Recent)     None                  Assessment and Plan   Problem List Items Addressed This Visit    None  Visit Diagnoses     Cervical radiculopathy    -  Primary    Relevant Orders    MRI Cervical Spine Without Contrast    Paresthesia of left upper extremity        Relevant Orders    MRI Cervical Spine Without Contrast    EMG & Nerve Conduction Test    Paresthesia of right upper extremity        Relevant Orders    MRI Cervical Spine Without Contrast    EMG & Nerve Conduction Test    Right shoulder pain, unspecified chronicity            Follow Up   Return for Recheck.  Patient endorses regular marijuana use.  She does have history of previous tobacco abuse.  Encourage continued tobacco cessation.  Did encourage discontinuation of marijuana use, if patient is hoping to establish care with pain management clinic.    Patient Instructions   Patient's primary c/o is C spine pain with BUE radiculopathy. Order placed for MRI C spine and BUE EMG/NCVs.     Advised to continue PT. Continue home exercises.     Follow up with MRI and EMG results. Call with changes or concerns.     Patient was given instructions and counseling regarding her condition or for health maintenance advice. Please see specific information pulled into the AVS if appropriate.

## 2023-05-11 DIAGNOSIS — M25.511 ACUTE PAIN OF RIGHT SHOULDER: ICD-10-CM

## 2023-05-11 DIAGNOSIS — M54.6 ACUTE RIGHT-SIDED THORACIC BACK PAIN: ICD-10-CM

## 2023-05-11 RX ORDER — BACLOFEN 10 MG/1
TABLET ORAL
Qty: 30 TABLET | Refills: 1 | OUTPATIENT
Start: 2023-05-11

## 2023-05-16 DIAGNOSIS — M54.6 ACUTE RIGHT-SIDED THORACIC BACK PAIN: ICD-10-CM

## 2023-05-16 DIAGNOSIS — E03.9 HYPOTHYROIDISM, UNSPECIFIED TYPE: ICD-10-CM

## 2023-05-16 DIAGNOSIS — M25.511 ACUTE PAIN OF RIGHT SHOULDER: ICD-10-CM

## 2023-05-16 RX ORDER — LEVOTHYROXINE SODIUM 112 UG/1
112 TABLET ORAL DAILY
Qty: 30 TABLET | Refills: 0 | OUTPATIENT
Start: 2023-05-16

## 2023-05-16 RX ORDER — BACLOFEN 10 MG/1
TABLET ORAL
Qty: 30 TABLET | Refills: 0 | OUTPATIENT
Start: 2023-05-16

## 2023-05-18 ENCOUNTER — HOSPITAL ENCOUNTER (OUTPATIENT)
Dept: MRI IMAGING | Facility: HOSPITAL | Age: 68
Discharge: HOME | End: 2023-05-18
Payer: MEDICARE

## 2023-05-18 DIAGNOSIS — M54.12 CERVICAL RADICULOPATHY: ICD-10-CM

## 2023-05-18 DIAGNOSIS — R20.2 PARESTHESIA OF RIGHT UPPER EXTREMITY: ICD-10-CM

## 2023-05-18 DIAGNOSIS — R20.2 PARESTHESIA OF LEFT UPPER EXTREMITY: ICD-10-CM

## 2023-05-18 PROCEDURE — 72141 MRI NECK SPINE W/O DYE: CPT

## 2023-05-19 ENCOUNTER — CLINICAL SUPPORT (OUTPATIENT)
Dept: FAMILY MEDICINE CLINIC | Facility: CLINIC | Age: 68
End: 2023-05-19
Payer: MEDICARE

## 2023-05-19 DIAGNOSIS — E03.9 HYPOTHYROIDISM, UNSPECIFIED TYPE: ICD-10-CM

## 2023-05-19 LAB
T4 FREE SERPL-MCNC: 1.85 NG/DL (ref 0.93–1.7)
TSH SERPL DL<=0.05 MIU/L-ACNC: 0.48 UIU/ML (ref 0.27–4.2)

## 2023-05-19 PROCEDURE — 84443 ASSAY THYROID STIM HORMONE: CPT | Performed by: FAMILY MEDICINE

## 2023-05-19 PROCEDURE — 84439 ASSAY OF FREE THYROXINE: CPT | Performed by: FAMILY MEDICINE

## 2023-05-19 PROCEDURE — 36415 COLL VENOUS BLD VENIPUNCTURE: CPT | Performed by: FAMILY MEDICINE

## 2023-05-19 NOTE — PROGRESS NOTES
..  Venipuncture Blood Specimen Collection  Venipuncture performed in LT arm by Barb Sims MA with good hemostasis. Patient tolerated the procedure well without complications.   05/19/23   Barb Sims MA

## 2023-05-21 DIAGNOSIS — M79.2 NEUROPATHIC PAIN: ICD-10-CM

## 2023-05-22 ENCOUNTER — PROCEDURE VISIT (OUTPATIENT)
Dept: NEUROLOGY | Facility: CLINIC | Age: 68
End: 2023-05-22
Payer: MEDICARE

## 2023-05-22 VITALS
HEART RATE: 72 BPM | HEIGHT: 63 IN | WEIGHT: 129 LBS | DIASTOLIC BLOOD PRESSURE: 72 MMHG | BODY MASS INDEX: 22.86 KG/M2 | SYSTOLIC BLOOD PRESSURE: 155 MMHG

## 2023-05-22 DIAGNOSIS — R20.2 PARESTHESIA OF RIGHT UPPER EXTREMITY: ICD-10-CM

## 2023-05-22 DIAGNOSIS — R20.2 PARESTHESIA OF LEFT UPPER EXTREMITY: ICD-10-CM

## 2023-05-22 DIAGNOSIS — G56.03 BILATERAL CARPAL TUNNEL SYNDROME: Primary | ICD-10-CM

## 2023-05-22 RX ORDER — GABAPENTIN 100 MG/1
CAPSULE ORAL
Qty: 90 CAPSULE | Refills: 0 | Status: SHIPPED | OUTPATIENT
Start: 2023-05-22

## 2023-05-22 NOTE — ASSESSMENT & PLAN NOTE
Nerve conduction study is abnormal and shows electrophysiologic evidence for severe bilateral carpal tunnel syndrome.  I discussed with her that she needs to buy wrist splints to wear nightly until she sees orthopedic surgery and she will need lateral carpal tunnel surgery to relieve her symptoms.  I discussed with her that her shoulder pain is not related to a pinched nerve in her neck and not related to her carpal tunnel syndrome.

## 2023-05-22 NOTE — PROGRESS NOTES
"Chief Complaint  Numbness (bue)    Subjective          Nora Bone is a 68 y.o. female who presents to [facility]  History of Present Illness  68-year-old woman evaluated for nerve conduction study.  She states that she has had numbness in both hands for the last 6 months.  It is numb and tingly and it is painful.  She has weakness in her hands that she cannot  and open bottles.  She is complaining of bilateral shoulder pain.    Objective   Vital Signs:   /72   Pulse 72   Ht 160 cm (62.99\")   Wt 58.5 kg (129 lb)   BMI 22.86 kg/m²     Physical Exam   There is no weakness of the upper extremity division muscle testing except for -45 strength bilateral abductor pollicis brevis muscles.  There is no weakness of other intrinsic hand muscles.  Phalen sign is positive.        Assessment and Plan  Diagnoses and all orders for this visit:    1. Bilateral carpal tunnel syndrome (Primary)  Assessment & Plan:  Nerve conduction study is abnormal and shows electrophysiologic evidence for severe bilateral carpal tunnel syndrome.  I discussed with her that she needs to buy wrist splints to wear nightly until she sees orthopedic surgery and she will need lateral carpal tunnel surgery to relieve her symptoms.  I discussed with her that her shoulder pain is not related to a pinched nerve in her neck and not related to her carpal tunnel syndrome.      2. Paresthesia of left upper extremity  -     EMG & Nerve Conduction Test    3. Paresthesia of right upper extremity  -     EMG & Nerve Conduction Test       Nerve Conduction Study:  7 nerves     EMG:  Not done    Total time spent with the patient and coordinating patient care was 15 minutes.    Follow Up  No follow-ups on file.  Patient was given instructions and counseling regarding her condition or for health maintenance advice. Please see specific information pulled into the AVS if appropriate.       "

## 2023-05-23 DIAGNOSIS — M25.511 ACUTE PAIN OF RIGHT SHOULDER: ICD-10-CM

## 2023-05-23 DIAGNOSIS — M54.6 ACUTE RIGHT-SIDED THORACIC BACK PAIN: ICD-10-CM

## 2023-05-23 RX ORDER — CELECOXIB 200 MG/1
200 CAPSULE ORAL DAILY
Qty: 30 CAPSULE | Refills: 0 | Status: SHIPPED | OUTPATIENT
Start: 2023-05-23

## 2023-05-30 ENCOUNTER — OFFICE VISIT (OUTPATIENT)
Dept: FAMILY MEDICINE CLINIC | Facility: CLINIC | Age: 68
End: 2023-05-30

## 2023-05-30 VITALS
WEIGHT: 129 LBS | TEMPERATURE: 98.2 F | DIASTOLIC BLOOD PRESSURE: 75 MMHG | SYSTOLIC BLOOD PRESSURE: 128 MMHG | OXYGEN SATURATION: 98 % | BODY MASS INDEX: 22.86 KG/M2 | HEART RATE: 75 BPM

## 2023-05-30 DIAGNOSIS — E03.9 HYPOTHYROIDISM, UNSPECIFIED TYPE: ICD-10-CM

## 2023-05-30 DIAGNOSIS — M54.6 ACUTE RIGHT-SIDED THORACIC BACK PAIN: ICD-10-CM

## 2023-05-30 DIAGNOSIS — M25.511 ACUTE PAIN OF RIGHT SHOULDER: ICD-10-CM

## 2023-05-30 DIAGNOSIS — G56.03 BILATERAL CARPAL TUNNEL SYNDROME: Primary | ICD-10-CM

## 2023-05-30 PROCEDURE — 99213 OFFICE O/P EST LOW 20 MIN: CPT | Performed by: FAMILY MEDICINE

## 2023-05-30 RX ORDER — LEVOTHYROXINE SODIUM 0.1 MG/1
100 TABLET ORAL DAILY
Qty: 90 TABLET | Refills: 1 | Status: SHIPPED | OUTPATIENT
Start: 2023-05-30

## 2023-05-30 RX ORDER — BACLOFEN 10 MG/1
10 TABLET ORAL 3 TIMES DAILY
Qty: 270 TABLET | Refills: 1 | Status: SHIPPED | OUTPATIENT
Start: 2023-05-30

## 2023-05-30 NOTE — PROGRESS NOTES
Chief Complaint  Wrist Pain (Follow up on MRI )    Subjective          Nora Bone presents to [facility]  History of Present Illness  Pt says that her gloves help her hand and wrist pain    Pt needs cock-up wrist splints to wear at night for carpal tunnel      Objective   Allergies   Allergen Reactions   • Hydrocodone-Acetaminophen Nausea And Vomiting     Also reports shaking        Immunization History   Administered Date(s) Administered   • COVID-19 (MODERNA) 1st,2nd,3rd Dose Monovalent 2021, 2021   • Flu Vaccine Quad PF >36MO 2017, 2018, 10/15/2019, 2021   • FluLaval/Fluzone >6mos 10/28/2021   • Fluzone High-Dose 65+yrs 2022   • Hepatitis A 2018, 2018   • Pneumococcal Polysaccharide (PPSV23) 10/28/2021   • Tdap 2018     Past Medical History:   Diagnosis Date   • Anemia    • Anxiety    • Asthma    • Back pain    • Cancer    • Cervical cancer    • COPD (chronic obstructive pulmonary disease)    • Deep vein thrombosis    • Disease of thyroid gland    • Diverticulosis    • Hearing loss    • Hyperlipidemia    • Peripheral vascular disease       Past Surgical History:   Procedure Laterality Date   • COLONOSCOPY W/ BIOPSIES     • DILATATION AND CURETTAGE     • VAGINAL HYSTERECTOMY     • VEIN SURGERY Right       Social History     Socioeconomic History   • Marital status: Single   Tobacco Use   • Smoking status: Former     Packs/day: 1.00     Years: 20.00     Pack years: 20.00     Types: Cigarettes     Quit date: 2015     Years since quittin.4     Passive exposure: Never   • Smokeless tobacco: Never   Vaping Use   • Vaping Use: Former   Substance and Sexual Activity   • Alcohol use: Never   • Drug use: Yes     Types: Marijuana   • Sexual activity: Yes     Birth control/protection: Surgical        Current Outpatient Medications:   •  albuterol sulfate  (90 Base) MCG/ACT inhaler, INHALE 2 PUFFS BY MOUTH FOUR TIMES  "DAILY AS NEEDED FOR WHEEZING, Disp: 8.5 g, Rfl: 0  •  amitriptyline (ELAVIL) 25 MG tablet, Take 1 tablet by mouth Every Night., Disp: 30 tablet, Rfl: 1  •  B-D 3CC LUER-LUCINA SYR 25GX1\" 25G X 1\" 3 ML misc, use once monthly for b12 injection, Disp: 3 each, Rfl: 0  •  baclofen (LIORESAL) 10 MG tablet, Take 1 tablet by mouth 3 (Three) Times a Day., Disp: 270 tablet, Rfl: 1  •  budesonide-formoterol (SYMBICORT) 160-4.5 MCG/ACT inhaler, Inhale 2 puffs 2 (Two) Times a Day., Disp: 10.2 g, Rfl: 5  •  Calcium Carb-Cholecalciferol (Calcium 500 + D3) 500-5 MG-MCG tablet per tablet, Take 1 tablet by mouth 2 (Two) Times a Day., Disp: 180 tablet, Rfl: 1  •  celecoxib (CeleBREX) 200 MG capsule, Take 1 capsule by mouth Daily., Disp: 30 capsule, Rfl: 0  •  cyanocobalamin 1000 MCG/ML injection, Inject 1 mL into the appropriate muscle as directed by prescriber Every 28 (Twenty-Eight) Days., Disp: 3 mL, Rfl: 3  •  Diclofenac Sodium (VOLTAREN) 1 % gel gel, Apply 1 g topically to the appropriate area as directed Daily., Disp: , Rfl:   •  Dietary Management Product (Vasculera) tablet, Take 1 tablet by mouth Daily., Disp: 30 tablet, Rfl: 11  •  fluticasone (FLONASE) 50 MCG/ACT nasal spray, 1 spray into the nostril(s) as directed by provider Daily., Disp: 48 g, Rfl: 5  •  levocetirizine (XYZAL) 5 MG tablet, Take 1 tablet by mouth Every Evening., Disp: 90 tablet, Rfl: 1  •  levothyroxine (SYNTHROID, LEVOTHROID) 100 MCG tablet, Take 1 tablet by mouth Daily., Disp: 90 tablet, Rfl: 1  •  Probiotic Product (Next 2 Greatness PO), Take  by mouth., Disp: , Rfl:    Family History   Problem Relation Age of Onset   • Ovarian cancer Mother    • Heart disease Mother    • Thyroid disease Mother    • Breast cancer Sister    • Breast cancer Paternal Aunt    • Epilepsy Father           Vital Signs:   Vitals:    05/30/23 1244   BP: 128/75   Pulse: 75   Temp: 98.2 °F (36.8 °C)   SpO2: 98%   Weight: 58.5 kg (129 lb)       Review of Systems   Physical " Exam  Vitals reviewed.   Constitutional:       Appearance: Normal appearance. She is well-developed.   HENT:      Head: Normocephalic and atraumatic.      Right Ear: External ear normal.      Left Ear: External ear normal.      Mouth/Throat:      Pharynx: No oropharyngeal exudate.   Eyes:      Conjunctiva/sclera: Conjunctivae normal.      Pupils: Pupils are equal, round, and reactive to light.   Cardiovascular:      Rate and Rhythm: Normal rate and regular rhythm.      Pulses: Normal pulses.      Heart sounds: Normal heart sounds. No murmur heard.    No friction rub. No gallop.   Pulmonary:      Effort: Pulmonary effort is normal.      Breath sounds: Normal breath sounds. No wheezing or rhonchi.   Abdominal:      General: Abdomen is flat. Bowel sounds are normal. There is no distension.      Palpations: Abdomen is soft. There is no mass.      Tenderness: There is no abdominal tenderness. There is no guarding or rebound.      Hernia: No hernia is present.   Musculoskeletal:         General: Normal range of motion.   Skin:     General: Skin is warm and dry.      Capillary Refill: Capillary refill takes less than 2 seconds.   Neurological:      General: No focal deficit present.      Mental Status: She is alert and oriented to person, place, and time.      Cranial Nerves: No cranial nerve deficit.   Psychiatric:         Mood and Affect: Mood and affect normal.         Behavior: Behavior normal.         Thought Content: Thought content normal.         Judgment: Judgment normal.        Result Review :                 Assessment and Plan    Diagnoses and all orders for this visit:    1. Bilateral carpal tunnel syndrome (Primary)    2. Acute pain of right shoulder  -     baclofen (LIORESAL) 10 MG tablet; Take 1 tablet by mouth 3 (Three) Times a Day.  Dispense: 270 tablet; Refill: 1    3. Acute right-sided thoracic back pain  -     baclofen (LIORESAL) 10 MG tablet; Take 1 tablet by mouth 3 (Three) Times a Day.  Dispense:  270 tablet; Refill: 1    4. Hypothyroidism, unspecified type  -     levothyroxine (SYNTHROID, LEVOTHROID) 100 MCG tablet; Take 1 tablet by mouth Daily.  Dispense: 90 tablet; Refill: 1            Follow Up   Return in about 6 months (around 11/30/2023), or if symptoms worsen or fail to improve, for Recheck.  Patient was given instructions and counseling regarding her condition or for health maintenance advice. Please see specific information pulled into the AVS if appropriate.     Pt given cock-up wrist splints to wear at night.

## 2023-06-08 DIAGNOSIS — M19.90 ARTHRITIS: ICD-10-CM

## 2023-06-08 RX ORDER — AMITRIPTYLINE HYDROCHLORIDE 25 MG/1
25 TABLET, FILM COATED ORAL NIGHTLY
Qty: 30 TABLET | Refills: 1 | OUTPATIENT
Start: 2023-06-08

## 2023-06-10 DIAGNOSIS — M19.90 ARTHRITIS: ICD-10-CM

## 2023-06-12 RX ORDER — AMITRIPTYLINE HYDROCHLORIDE 25 MG/1
25 TABLET, FILM COATED ORAL NIGHTLY
Qty: 30 TABLET | Refills: 1 | OUTPATIENT
Start: 2023-06-12

## 2023-06-13 ENCOUNTER — OFFICE VISIT (OUTPATIENT)
Dept: FAMILY MEDICINE CLINIC | Facility: CLINIC | Age: 68
End: 2023-06-13
Payer: MEDICARE

## 2023-06-13 VITALS
SYSTOLIC BLOOD PRESSURE: 120 MMHG | WEIGHT: 129.4 LBS | DIASTOLIC BLOOD PRESSURE: 74 MMHG | HEART RATE: 85 BPM | OXYGEN SATURATION: 97 % | TEMPERATURE: 97.8 F | BODY MASS INDEX: 22.93 KG/M2

## 2023-06-13 DIAGNOSIS — M19.90 ARTHRITIS: ICD-10-CM

## 2023-06-13 DIAGNOSIS — G56.03 BILATERAL CARPAL TUNNEL SYNDROME: Primary | ICD-10-CM

## 2023-06-13 RX ORDER — CELECOXIB 200 MG/1
200 CAPSULE ORAL DAILY
Qty: 30 CAPSULE | Refills: 1 | Status: SHIPPED | OUTPATIENT
Start: 2023-06-13

## 2023-06-13 RX ORDER — AMITRIPTYLINE HYDROCHLORIDE 25 MG/1
25 TABLET, FILM COATED ORAL NIGHTLY
Qty: 30 TABLET | Refills: 1 | Status: SHIPPED | OUTPATIENT
Start: 2023-06-13

## 2023-06-13 NOTE — PROGRESS NOTES
Chief Complaint  Wrist Pain (Follow up on wrist pain)    Subjective          Nora Bone presents to [facility]  History of Present Illness  Pt says that wrist pain is not controlled with conservative tx of nsaids and braces    Objective   Allergies   Allergen Reactions    Hydrocodone-Acetaminophen Nausea And Vomiting     Also reports shaking        Immunization History   Administered Date(s) Administered    COVID-19 (MODERNA) 1st,2nd,3rd Dose Monovalent 2021, 2021    Flu Vaccine Quad PF >36MO 2017, 2018, 10/15/2019, 2021    FluLaval/Fluzone >6mos 10/28/2021    Fluzone High-Dose 65+yrs 2022    Hepatitis A 2018, 2018    Pneumococcal Polysaccharide (PPSV23) 10/28/2021    Tdap 2018     Past Medical History:   Diagnosis Date    Anemia     Anxiety     Asthma     Back pain     Cancer     Cervical cancer     COPD (chronic obstructive pulmonary disease)     Deep vein thrombosis     Disease of thyroid gland     Diverticulosis     Hearing loss     Hyperlipidemia     Peripheral vascular disease       Past Surgical History:   Procedure Laterality Date    COLONOSCOPY W/ BIOPSIES      DILATATION AND CURETTAGE      VAGINAL HYSTERECTOMY      VEIN SURGERY Right       Social History     Socioeconomic History    Marital status: Single   Tobacco Use    Smoking status: Former     Packs/day: 1.00     Years: 20.00     Pack years: 20.00     Types: Cigarettes     Quit date: 2015     Years since quittin.4     Passive exposure: Never    Smokeless tobacco: Never   Vaping Use    Vaping Use: Former   Substance and Sexual Activity    Alcohol use: Never    Drug use: Yes     Types: Marijuana    Sexual activity: Yes     Birth control/protection: Surgical        Current Outpatient Medications:     albuterol sulfate  (90 Base) MCG/ACT inhaler, INHALE 2 PUFFS BY MOUTH FOUR TIMES DAILY AS NEEDED FOR WHEEZING, Disp: 8.5 g, Rfl: 0    B-D 3CC LUER-LUCINA  "SYR 25GX1\" 25G X 1\" 3 ML misc, use once monthly for b12 injection, Disp: 3 each, Rfl: 0    baclofen (LIORESAL) 10 MG tablet, Take 1 tablet by mouth 3 (Three) Times a Day., Disp: 270 tablet, Rfl: 1    budesonide-formoterol (SYMBICORT) 160-4.5 MCG/ACT inhaler, Inhale 2 puffs 2 (Two) Times a Day., Disp: 10.2 g, Rfl: 5    Calcium Carb-Cholecalciferol (Calcium 500 + D3) 500-5 MG-MCG tablet per tablet, Take 1 tablet by mouth 2 (Two) Times a Day., Disp: 180 tablet, Rfl: 1    cyanocobalamin 1000 MCG/ML injection, Inject 1 mL into the appropriate muscle as directed by prescriber Every 28 (Twenty-Eight) Days., Disp: 3 mL, Rfl: 3    Diclofenac Sodium (VOLTAREN) 1 % gel gel, Apply 1 g topically to the appropriate area as directed Daily., Disp: , Rfl:     Dietary Management Product (Vasculera) tablet, Take 1 tablet by mouth Daily., Disp: 30 tablet, Rfl: 11    fluticasone (FLONASE) 50 MCG/ACT nasal spray, 1 spray into the nostril(s) as directed by provider Daily., Disp: 48 g, Rfl: 5    levothyroxine (SYNTHROID, LEVOTHROID) 100 MCG tablet, Take 1 tablet by mouth Daily., Disp: 90 tablet, Rfl: 1    Probiotic Product (Superfocus PO), Take  by mouth., Disp: , Rfl:     amitriptyline (ELAVIL) 25 MG tablet, Take 1 tablet by mouth Every Night., Disp: 30 tablet, Rfl: 1    celecoxib (CeleBREX) 200 MG capsule, Take 1 capsule by mouth Daily., Disp: 30 capsule, Rfl: 1   Family History   Problem Relation Age of Onset    Ovarian cancer Mother     Heart disease Mother     Thyroid disease Mother     Breast cancer Sister     Breast cancer Paternal Aunt     Epilepsy Father           Vital Signs:   Vitals:    06/13/23 0845   BP: 120/74   Pulse: 85   Temp: 97.8 °F (36.6 °C)   SpO2: 97%   Weight: 58.7 kg (129 lb 6.4 oz)       Review of Systems   Physical Exam  Vitals reviewed.   Constitutional:       Appearance: Normal appearance. She is well-developed.   HENT:      Head: Normocephalic and atraumatic.      Right Ear: External ear normal.     "  Left Ear: External ear normal.      Mouth/Throat:      Pharynx: No oropharyngeal exudate.   Eyes:      Conjunctiva/sclera: Conjunctivae normal.      Pupils: Pupils are equal, round, and reactive to light.   Cardiovascular:      Rate and Rhythm: Normal rate and regular rhythm.      Pulses: Normal pulses.      Heart sounds: Normal heart sounds. No murmur heard.    No friction rub. No gallop.   Pulmonary:      Effort: Pulmonary effort is normal.      Breath sounds: Normal breath sounds. No wheezing or rhonchi.   Abdominal:      General: Abdomen is flat. Bowel sounds are normal. There is no distension.      Palpations: Abdomen is soft. There is no mass.      Tenderness: There is no abdominal tenderness. There is no guarding or rebound.      Hernia: No hernia is present.   Musculoskeletal:         General: Normal range of motion.   Skin:     General: Skin is warm and dry.      Capillary Refill: Capillary refill takes less than 2 seconds.   Neurological:      General: No focal deficit present.      Mental Status: She is alert and oriented to person, place, and time.      Cranial Nerves: No cranial nerve deficit.   Psychiatric:         Mood and Affect: Mood and affect normal.         Behavior: Behavior normal.         Thought Content: Thought content normal.         Judgment: Judgment normal.      Result Review :                 Assessment and Plan    Diagnoses and all orders for this visit:    1. Bilateral carpal tunnel syndrome (Primary)  -     Ambulatory Referral to Hand Surgery  -     celecoxib (CeleBREX) 200 MG capsule; Take 1 capsule by mouth Daily.  Dispense: 30 capsule; Refill: 1    2. Arthritis  -     celecoxib (CeleBREX) 200 MG capsule; Take 1 capsule by mouth Daily.  Dispense: 30 capsule; Refill: 1  -     amitriptyline (ELAVIL) 25 MG tablet; Take 1 tablet by mouth Every Night.  Dispense: 30 tablet; Refill: 1    Continue txs        Follow Up   Return if symptoms worsen or fail to improve.  Patient was given  instructions and counseling regarding her condition or for health maintenance advice. Please see specific information pulled into the AVS if appropriate.

## 2023-06-21 ENCOUNTER — TELEPHONE (OUTPATIENT)
Dept: FAMILY MEDICINE CLINIC | Facility: CLINIC | Age: 68
End: 2023-06-21
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
## 2023-08-09 DIAGNOSIS — M19.90 ARTHRITIS: ICD-10-CM

## 2023-08-09 DIAGNOSIS — M79.2 NEUROPATHIC PAIN: ICD-10-CM

## 2023-08-09 DIAGNOSIS — J45.40 MODERATE PERSISTENT ASTHMA WITHOUT COMPLICATION: ICD-10-CM

## 2023-08-10 DIAGNOSIS — M79.2 NEUROPATHIC PAIN: ICD-10-CM

## 2023-08-10 DIAGNOSIS — G56.03 BILATERAL CARPAL TUNNEL SYNDROME: ICD-10-CM

## 2023-08-10 DIAGNOSIS — M54.6 ACUTE RIGHT-SIDED THORACIC BACK PAIN: ICD-10-CM

## 2023-08-10 DIAGNOSIS — M19.90 ARTHRITIS: ICD-10-CM

## 2023-08-10 DIAGNOSIS — E03.9 HYPOTHYROIDISM, UNSPECIFIED TYPE: ICD-10-CM

## 2023-08-10 DIAGNOSIS — M25.511 ACUTE PAIN OF RIGHT SHOULDER: ICD-10-CM

## 2023-08-10 DIAGNOSIS — J45.40 MODERATE PERSISTENT ASTHMA WITHOUT COMPLICATION: ICD-10-CM

## 2023-08-10 RX ORDER — BUDESONIDE AND FORMOTEROL FUMARATE DIHYDRATE 160; 4.5 UG/1; UG/1
2 AEROSOL RESPIRATORY (INHALATION) 2 TIMES DAILY
Qty: 10.2 G | Refills: 0 | Status: SHIPPED | OUTPATIENT
Start: 2023-08-10

## 2023-08-10 RX ORDER — AMITRIPTYLINE HYDROCHLORIDE 25 MG/1
25 TABLET, FILM COATED ORAL NIGHTLY
Qty: 30 TABLET | Refills: 0 | Status: SHIPPED | OUTPATIENT
Start: 2023-08-10

## 2023-08-10 RX ORDER — AMITRIPTYLINE HYDROCHLORIDE 25 MG/1
TABLET, FILM COATED ORAL
Qty: 30 TABLET | Refills: 5 | OUTPATIENT
Start: 2023-08-10

## 2023-08-10 RX ORDER — GABAPENTIN 100 MG/1
100 CAPSULE ORAL 3 TIMES DAILY
Qty: 90 CAPSULE | Refills: 0 | Status: SHIPPED | OUTPATIENT
Start: 2023-08-10

## 2023-08-10 RX ORDER — BACLOFEN 10 MG/1
10 TABLET ORAL 3 TIMES DAILY
Qty: 90 TABLET | Refills: 0 | Status: SHIPPED | OUTPATIENT
Start: 2023-08-10

## 2023-08-10 RX ORDER — CELECOXIB 200 MG/1
200 CAPSULE ORAL DAILY
Qty: 30 CAPSULE | Refills: 0 | Status: SHIPPED | OUTPATIENT
Start: 2023-08-10

## 2023-08-10 RX ORDER — BUDESONIDE AND FORMOTEROL FUMARATE DIHYDRATE 160; 4.5 UG/1; UG/1
AEROSOL RESPIRATORY (INHALATION)
Qty: 10.2 G | Refills: 5 | OUTPATIENT
Start: 2023-08-10

## 2023-08-10 RX ORDER — LEVOTHYROXINE SODIUM 0.1 MG/1
100 TABLET ORAL DAILY
Qty: 30 TABLET | Refills: 0 | Status: SHIPPED | OUTPATIENT
Start: 2023-08-10

## 2023-08-10 RX ORDER — GABAPENTIN 100 MG/1
CAPSULE ORAL
Qty: 90 CAPSULE | Refills: 5 | OUTPATIENT
Start: 2023-08-10

## 2023-08-29 ENCOUNTER — TELEPHONE (OUTPATIENT)
Dept: FAMILY MEDICINE CLINIC | Facility: CLINIC | Age: 68
End: 2023-08-29
Payer: MEDICARE

## 2023-08-29 NOTE — TELEPHONE ENCOUNTER
Caller: Nora Bone    Relationship: Self    Best call back number: [phone]    What is the best time to reach you: ANYTIME    Who are you requesting to speak with (clinical staff, provider,  specific staff member):       DR FAIR          What was the call regarding:        THE PATIENT IS WANTING A CALL TO DISCUSS THE INSURANCE INFORMATION.

## 2023-09-06 ENCOUNTER — TRANSCRIBE ORDERS (OUTPATIENT)
Dept: ADMINISTRATIVE | Facility: HOSPITAL | Age: 68
End: 2023-09-06
Payer: MEDICARE

## 2023-09-06 DIAGNOSIS — M62.81 MUSCLE WEAKNESS (GENERALIZED): Primary | ICD-10-CM

## 2023-09-06 DIAGNOSIS — R74.8 ELEVATED CPK: ICD-10-CM

## 2023-09-12 ENCOUNTER — OFFICE VISIT (OUTPATIENT)
Dept: FAMILY MEDICINE CLINIC | Facility: CLINIC | Age: 68
End: 2023-09-12
Payer: MEDICARE

## 2023-09-12 VITALS
DIASTOLIC BLOOD PRESSURE: 80 MMHG | SYSTOLIC BLOOD PRESSURE: 140 MMHG | BODY MASS INDEX: 21.97 KG/M2 | TEMPERATURE: 97.7 F | HEART RATE: 75 BPM | HEIGHT: 63 IN | WEIGHT: 124 LBS | OXYGEN SATURATION: 95 %

## 2023-09-12 DIAGNOSIS — Z23 NEED FOR VACCINATION FOR PNEUMOCOCCUS: ICD-10-CM

## 2023-09-12 DIAGNOSIS — E78.2 MIXED HYPERLIPIDEMIA: ICD-10-CM

## 2023-09-12 DIAGNOSIS — Z87.891 FORMER TOBACCO USE: ICD-10-CM

## 2023-09-12 DIAGNOSIS — G56.03 BILATERAL CARPAL TUNNEL SYNDROME: ICD-10-CM

## 2023-09-12 DIAGNOSIS — M19.90 ARTHRITIS: ICD-10-CM

## 2023-09-12 DIAGNOSIS — E03.9 HYPOTHYROIDISM, UNSPECIFIED TYPE: ICD-10-CM

## 2023-09-12 DIAGNOSIS — Z00.00 MEDICARE ANNUAL WELLNESS VISIT, SUBSEQUENT: Primary | ICD-10-CM

## 2023-09-12 DIAGNOSIS — M25.511 ACUTE PAIN OF RIGHT SHOULDER: ICD-10-CM

## 2023-09-12 DIAGNOSIS — M54.6 ACUTE RIGHT-SIDED THORACIC BACK PAIN: ICD-10-CM

## 2023-09-12 DIAGNOSIS — J45.40 MODERATE PERSISTENT ASTHMA WITHOUT COMPLICATION: ICD-10-CM

## 2023-09-12 DIAGNOSIS — J30.89 NON-SEASONAL ALLERGIC RHINITIS, UNSPECIFIED TRIGGER: ICD-10-CM

## 2023-09-12 LAB
ALBUMIN SERPL-MCNC: 4.7 G/DL (ref 3.5–5.2)
ALBUMIN/GLOB SERPL: 1.7 G/DL
ALP SERPL-CCNC: 92 U/L (ref 39–117)
ALT SERPL W P-5'-P-CCNC: 27 U/L (ref 1–33)
ANION GAP SERPL CALCULATED.3IONS-SCNC: 11.3 MMOL/L (ref 5–15)
AST SERPL-CCNC: 26 U/L (ref 1–32)
BILIRUB SERPL-MCNC: 0.3 MG/DL (ref 0–1.2)
BUN SERPL-MCNC: 13 MG/DL (ref 8–23)
BUN/CREAT SERPL: 14.8 (ref 7–25)
CALCIUM SPEC-SCNC: 9.5 MG/DL (ref 8.6–10.5)
CHLORIDE SERPL-SCNC: 106 MMOL/L (ref 98–107)
CHOLEST SERPL-MCNC: 235 MG/DL (ref 0–200)
CO2 SERPL-SCNC: 25.7 MMOL/L (ref 22–29)
CREAT SERPL-MCNC: 0.88 MG/DL (ref 0.57–1)
EGFRCR SERPLBLD CKD-EPI 2021: 72.6 ML/MIN/1.73
GLOBULIN UR ELPH-MCNC: 2.8 GM/DL
GLUCOSE SERPL-MCNC: 83 MG/DL (ref 65–99)
HDLC SERPL-MCNC: 59 MG/DL (ref 40–60)
LDLC SERPL CALC-MCNC: 151 MG/DL (ref 0–100)
LDLC/HDLC SERPL: 2.52 {RATIO}
POTASSIUM SERPL-SCNC: 4.5 MMOL/L (ref 3.5–5.2)
PROT SERPL-MCNC: 7.5 G/DL (ref 6–8.5)
SODIUM SERPL-SCNC: 143 MMOL/L (ref 136–145)
T4 FREE SERPL-MCNC: 1.88 NG/DL (ref 0.93–1.7)
TRIGL SERPL-MCNC: 138 MG/DL (ref 0–150)
TSH SERPL DL<=0.05 MIU/L-ACNC: 0.04 UIU/ML (ref 0.27–4.2)
VLDLC SERPL-MCNC: 25 MG/DL (ref 5–40)

## 2023-09-12 PROCEDURE — 84443 ASSAY THYROID STIM HORMONE: CPT | Performed by: FAMILY MEDICINE

## 2023-09-12 PROCEDURE — 84439 ASSAY OF FREE THYROXINE: CPT | Performed by: FAMILY MEDICINE

## 2023-09-12 PROCEDURE — 80061 LIPID PANEL: CPT | Performed by: FAMILY MEDICINE

## 2023-09-12 PROCEDURE — 80053 COMPREHEN METABOLIC PANEL: CPT | Performed by: FAMILY MEDICINE

## 2023-09-12 RX ORDER — LEVOTHYROXINE SODIUM 0.1 MG/1
100 TABLET ORAL DAILY
Qty: 90 TABLET | Refills: 1 | Status: SHIPPED | OUTPATIENT
Start: 2023-09-12 | End: 2023-09-12 | Stop reason: SDUPTHER

## 2023-09-12 RX ORDER — ALBUTEROL SULFATE 90 UG/1
2 AEROSOL, METERED RESPIRATORY (INHALATION) EVERY 6 HOURS PRN
Qty: 18 G | Refills: 3 | Status: SHIPPED | OUTPATIENT
Start: 2023-09-12

## 2023-09-12 RX ORDER — CELECOXIB 200 MG/1
200 CAPSULE ORAL DAILY
Qty: 90 CAPSULE | Refills: 1 | Status: SHIPPED | OUTPATIENT
Start: 2023-09-12

## 2023-09-12 RX ORDER — BUDESONIDE AND FORMOTEROL FUMARATE DIHYDRATE 160; 4.5 UG/1; UG/1
2 AEROSOL RESPIRATORY (INHALATION) 2 TIMES DAILY
Qty: 10.2 G | Refills: 3 | Status: SHIPPED | OUTPATIENT
Start: 2023-09-12

## 2023-09-12 RX ORDER — BACLOFEN 20 MG/1
20 TABLET ORAL 3 TIMES DAILY PRN
Qty: 270 TABLET | Refills: 1 | Status: SHIPPED | OUTPATIENT
Start: 2023-09-12

## 2023-09-12 RX ORDER — LEVOTHYROXINE SODIUM 88 UG/1
88 TABLET ORAL DAILY
Qty: 30 TABLET | Refills: 1 | Status: SHIPPED | OUTPATIENT
Start: 2023-09-12

## 2023-09-12 RX ORDER — FLUTICASONE PROPIONATE 50 MCG
1 SPRAY, SUSPENSION (ML) NASAL DAILY
Qty: 48 G | Refills: 5 | Status: SHIPPED | OUTPATIENT
Start: 2023-09-12

## 2023-09-12 RX ORDER — AMITRIPTYLINE HYDROCHLORIDE 25 MG/1
25 TABLET, FILM COATED ORAL NIGHTLY
Qty: 90 TABLET | Refills: 1 | Status: SHIPPED | OUTPATIENT
Start: 2023-09-12

## 2023-09-12 NOTE — PROGRESS NOTES
"The ABCs of the Annual Wellness Visit  Subsequent Medicare Wellness Visit    Subjective    Nora Bone is a 68 y.o. female who presents for a Subsequent Medicare Wellness Visit.    The following portions of the patient's history were reviewed and   updated as appropriate: She  has a past medical history of Anemia, Anxiety, Asthma, Back pain, Cancer, Cervical cancer, COPD (chronic obstructive pulmonary disease), Deep vein thrombosis, Disease of thyroid gland, Diverticulosis, Hearing loss, Hyperlipidemia, and Peripheral vascular disease.  She does not have any pertinent problems on file.  She  has a past surgical history that includes Vein Surgery (Right); Total vaginal hysterectomy; Colonoscopy w/ biopsies; and Dilation and curettage of uterus.  Her family history includes Breast cancer in her paternal aunt and sister; Epilepsy in her father; Heart disease in her mother; Ovarian cancer in her mother; Thyroid disease in her mother.  She  reports that she quit smoking about 8 years ago. Her smoking use included cigarettes. She has a 20.00 pack-year smoking history. She has never been exposed to tobacco smoke. She has never used smokeless tobacco. She reports current drug use. Drug: Marijuana. She reports that she does not drink alcohol.  Current Outpatient Medications   Medication Sig Dispense Refill    albuterol sulfate  (90 Base) MCG/ACT inhaler Inhale 2 puffs Every 6 (Six) Hours As Needed for Wheezing. 18 g 3    amitriptyline (ELAVIL) 25 MG tablet Take 1 tablet by mouth Every Night. 90 tablet 1    B-D 3CC LUER-LUCINA SYR 25GX1\" 25G X 1\" 3 ML misc use once monthly for b12 injection 3 each 0    baclofen (LIORESAL) 20 MG tablet Take 1 tablet by mouth 3 (Three) Times a Day As Needed for Muscle Spasms. 270 tablet 1    Calcium Carb-Cholecalciferol (Calcium 500 + D3) 500-5 MG-MCG tablet per tablet Take 1 tablet by mouth 2 (Two) Times a Day. 180 tablet 0    celecoxib (CeleBREX) 200 MG capsule Take 1 capsule by mouth " "Daily. 90 capsule 1    cyanocobalamin 1000 MCG/ML injection INJECT 1ML INTO THE APPROPRIATE MUSCLE AS DIRECTED BY PRESCRIBER EVERY 28 DAYS. 3 mL 3    Diclofenac Sodium (VOLTAREN) 1 % gel gel Apply 1 g topically to the appropriate area as directed Daily.      Dietary Management Product (Vasculera) tablet Take 1 tablet by mouth Daily. 30 tablet 11    fluticasone (FLONASE) 50 MCG/ACT nasal spray 1 spray into the nostril(s) as directed by provider Daily. 48 g 5    levothyroxine (SYNTHROID, LEVOTHROID) 100 MCG tablet Take 1 tablet by mouth Daily. 90 tablet 1    Probiotic Product ([employer] PO) Take  by mouth.      Symbicort 160-4.5 MCG/ACT inhaler Inhale 2 puffs 2 (Two) Times a Day. 10.2 g 3     No current facility-administered medications for this visit.     Current Outpatient Medications on File Prior to Visit   Medication Sig    B-D 3CC LUER-LUCINA SYR 25GX1\" 25G X 1\" 3 ML misc use once monthly for b12 injection    Calcium Carb-Cholecalciferol (Calcium 500 + D3) 500-5 MG-MCG tablet per tablet Take 1 tablet by mouth 2 (Two) Times a Day.    cyanocobalamin 1000 MCG/ML injection INJECT 1ML INTO THE APPROPRIATE MUSCLE AS DIRECTED BY PRESCRIBER EVERY 28 DAYS.    Diclofenac Sodium (VOLTAREN) 1 % gel gel Apply 1 g topically to the appropriate area as directed Daily.    Dietary Management Product (Vasculera) tablet Take 1 tablet by mouth Daily.    Probiotic Product ([employer] PO) Take  by mouth.    [DISCONTINUED] albuterol sulfate  (90 Base) MCG/ACT inhaler INHALE 2 PUFFS BY MOUTH FOUR TIMES DAILY AS NEEDED FOR WHEEZING    [DISCONTINUED] amitriptyline (ELAVIL) 25 MG tablet Take 1 tablet by mouth Every Night.    [DISCONTINUED] baclofen (LIORESAL) 10 MG tablet Take 1 tablet by mouth 3 (Three) Times a Day.    [DISCONTINUED] celecoxib (CeleBREX) 200 MG capsule Take 1 capsule by mouth Daily.    [DISCONTINUED] fluticasone (FLONASE) 50 MCG/ACT nasal spray 1 spray into the nostril(s) as directed by provider " "Daily.    [DISCONTINUED] levothyroxine (SYNTHROID, LEVOTHROID) 100 MCG tablet Take 1 tablet by mouth Daily.    [DISCONTINUED] Symbicort 160-4.5 MCG/ACT inhaler Inhale 2 puffs 2 (Two) Times a Day.    [DISCONTINUED] gabapentin (NEURONTIN) 100 MG capsule Take 1 capsule by mouth 3 (Three) Times a Day. (Patient not taking: Reported on 9/12/2023)     No current facility-administered medications on file prior to visit.     She is allergic to hydrocodone-acetaminophen..    Compared to one year ago, the patient feels her physical   health is better.    Compared to one year ago, the patient feels her mental   health is better.    Recent Hospitalizations:  She was not admitted to the hospital during the last year.       Current Medical Providers:  Patient Care Team:  Jarrod Simms MD as PCP - General (Family Medicine)    Outpatient Medications Prior to Visit   Medication Sig Dispense Refill    B-D 3CC LUER-LUCINA SYR 25GX1\" 25G X 1\" 3 ML misc use once monthly for b12 injection 3 each 0    Calcium Carb-Cholecalciferol (Calcium 500 + D3) 500-5 MG-MCG tablet per tablet Take 1 tablet by mouth 2 (Two) Times a Day. 180 tablet 0    cyanocobalamin 1000 MCG/ML injection INJECT 1ML INTO THE APPROPRIATE MUSCLE AS DIRECTED BY PRESCRIBER EVERY 28 DAYS. 3 mL 3    Diclofenac Sodium (VOLTAREN) 1 % gel gel Apply 1 g topically to the appropriate area as directed Daily.      Dietary Management Product (Vasculera) tablet Take 1 tablet by mouth Daily. 30 tablet 11    Probiotic Product (Vite ) Take  by mouth.      albuterol sulfate  (90 Base) MCG/ACT inhaler INHALE 2 PUFFS BY MOUTH FOUR TIMES DAILY AS NEEDED FOR WHEEZING 8.5 g 0    amitriptyline (ELAVIL) 25 MG tablet Take 1 tablet by mouth Every Night. 30 tablet 0    baclofen (LIORESAL) 10 MG tablet Take 1 tablet by mouth 3 (Three) Times a Day. 90 tablet 0    celecoxib (CeleBREX) 200 MG capsule Take 1 capsule by mouth Daily. 30 capsule 0    fluticasone (FLONASE) 50 " "MCG/ACT nasal spray 1 spray into the nostril(s) as directed by provider Daily. 48 g 5    levothyroxine (SYNTHROID, LEVOTHROID) 100 MCG tablet Take 1 tablet by mouth Daily. 30 tablet 0    Symbicort 160-4.5 MCG/ACT inhaler Inhale 2 puffs 2 (Two) Times a Day. 10.2 g 0    gabapentin (NEURONTIN) 100 MG capsule Take 1 capsule by mouth 3 (Three) Times a Day. (Patient not taking: Reported on 9/12/2023) 90 capsule 0     No facility-administered medications prior to visit.       No opioid medication identified on active medication list. I have reviewed chart for other potential  high risk medication/s and harmful drug interactions in the elderly.        Aspirin is not on active medication list.  Aspirin use is not indicated based on review of current medical condition/s. Risk of harm outweighs potential benefits.  .    Patient Active Problem List   Diagnosis    Anemia    Anxiety    Arthritis    Asthma    Cancer    Family history of malignant neoplasm of digestive organs    GERD (gastroesophageal reflux disease)    Hypothyroidism    Migraines    Rheumatic fever    Seasonal allergic rhinitis    Skin lesion    Varicose veins of bilateral lower extremities with pain    Venous insufficiency (chronic) (peripheral)    Elevated CK    Bartholin's gland cyst    Kidney stones    Leg swelling    Lung disease    Hyperthyroidism    Bilateral carpal tunnel syndrome    Former tobacco use     Advance Care Planning   Advance Care Planning     Advance Directive is not on file.  ACP discussion was held with the patient during this visit. Patient has an advance directive (not in EMR), copy requested.     Objective    Vitals:    09/12/23 1118   BP: 140/80   BP Location: Left arm   Patient Position: Sitting   Cuff Size: Adult   Pulse: 75   Temp: 97.7 °F (36.5 °C)   SpO2: 95%   Weight: 56.2 kg (124 lb)   Height: 160 cm (62.99\")     Estimated body mass index is 21.97 kg/m² as calculated from the following:    Height as of this encounter: 160 cm " "(62.99\").    Weight as of this encounter: 56.2 kg (124 lb).    BMI is within normal parameters. No other follow-up for BMI required.      Does the patient have evidence of cognitive impairment? No          HEALTH RISK ASSESSMENT    Smoking Status:  Social History     Tobacco Use   Smoking Status Former    Packs/day: 1.00    Years: 20.00    Pack years: 20.00    Types: Cigarettes    Quit date: 2015    Years since quittin.7    Passive exposure: Never   Smokeless Tobacco Never     Alcohol Consumption:  Social History     Substance and Sexual Activity   Alcohol Use Never     Fall Risk Screen:    STEADI Fall Risk Assessment was completed, and patient is at MODERATE risk for falls. Assessment completed on:2023    Depression Screenin/12/2023    11:00 AM   PHQ-2/PHQ-9 Depression Screening   Little Interest or Pleasure in Doing Things 0-->not at all   Feeling Down, Depressed or Hopeless 0-->not at all   PHQ-9: Brief Depression Severity Measure Score 0       Health Habits and Functional and Cognitive Screenin/12/2023    11:00 AM   Functional & Cognitive Status   Do you have difficulty preparing food and eating? No   Do you have difficulty bathing yourself, getting dressed or grooming yourself? No   Do you have difficulty using the toilet? No   Do you have difficulty moving around from place to place? No   Do you have trouble with steps or getting out of a bed or a chair? No   Current Diet Well Balanced Diet   Dental Exam Not up to date   Eye Exam Not up to date   Exercise (times per week) 3 times per week   Current Exercises Include Walking   Do you need help using the phone?  No   Are you deaf or do you have serious difficulty hearing?  No   Do you need help to go to places out of walking distance? No   Do you need help shopping? No   Do you need help preparing meals?  No   Do you need help with housework?  No   Do you need help with laundry? No   Do you need help taking your medications? No "   Do you need help managing money? No   Do you ever drive or ride in a car without wearing a seat belt? No   Have you felt unusual stress, anger or loneliness in the last month? No   Who do you live with? Spouse   If you need help, do you have trouble finding someone available to you? No   Have you been bothered in the last four weeks by sexual problems? No   Do you have difficulty concentrating, remembering or making decisions? No       Age-appropriate Screening Schedule:  Refer to the list below for future screening recommendations based on patient's age, sex and/or medical conditions. Orders for these recommended tests are listed in the plan section. The patient has been provided with a written plan.    Health Maintenance   Topic Date Due    ZOSTER VACCINE (1 of 2) Never done    COVID-19 Vaccine (3 - Moderna series) 11/12/2021    Pneumococcal Vaccine 65+ (2 - PCV) 10/28/2022    INFLUENZA VACCINE  10/01/2023    LUNG CANCER SCREENING  11/15/2023    LIPID PANEL  12/12/2023    ANNUAL WELLNESS VISIT  09/12/2024    COLORECTAL CANCER SCREENING  11/01/2024    MAMMOGRAM  11/15/2024    DXA SCAN  11/15/2024    TDAP/TD VACCINES (2 - Td or Tdap) 09/27/2028    HEPATITIS C SCREENING  Completed                  CMS Preventative Services Quick Reference  Risk Factors Identified During Encounter  Immunizations Discussed/Encouraged: Tdap, Prevnar 20 (Pneumococcal 20-valent conjugate), and Shingrix  Inactivity/Sedentary: Patient was advised to exercise at least 150 minutes a week per CDC recommendations.  The above risks/problems have been discussed with the patient.  Pertinent information has been shared with the patient in the After Visit Summary.  An After Visit Summary and PPPS were made available to the patient.    Follow Up:   Next Medicare Wellness visit to be scheduled in 1 year.       Additional E&M Note during same encounter follows:  Patient has multiple medical problems which are significant and separately identifiable  "that require additional work above and beyond the Medicare Wellness Visit.      Chief Complaint  Wrist Pain, Medicare Wellness-subsequent, and Hypothyroidism    Subjective        Wrist Pain   Pertinent negatives include no fever.   Hypothyroidism  Pertinent negatives include no abdominal pain, chest pain, coughing, fatigue, fever, headaches, nausea, sore throat or vomiting.       Review of Systems   Constitutional:  Negative for fatigue and fever.   HENT:  Negative for sore throat.    Eyes:  Negative for visual disturbance.   Respiratory:  Negative for cough, chest tightness, shortness of breath and wheezing.    Cardiovascular:  Negative for chest pain, palpitations and leg swelling.   Gastrointestinal:  Negative for abdominal pain, diarrhea, nausea and vomiting.   Neurological:  Negative for light-headedness.   Psychiatric/Behavioral:  Negative for decreased concentration, dysphoric mood and sleep disturbance.      Objective   Vital Signs:  /80 (BP Location: Left arm, Patient Position: Sitting, Cuff Size: Adult)   Pulse 75   Temp 97.7 °F (36.5 °C)   Ht 160 cm (62.99\")   Wt 56.2 kg (124 lb)   SpO2 95%   BMI 21.97 kg/m²     Physical Exam  Vitals reviewed.   Constitutional:       Appearance: Normal appearance. She is well-developed.   HENT:      Head: Normocephalic and atraumatic.      Right Ear: External ear normal.      Left Ear: External ear normal.      Mouth/Throat:      Pharynx: No oropharyngeal exudate.   Eyes:      Conjunctiva/sclera: Conjunctivae normal.      Pupils: Pupils are equal, round, and reactive to light.   Cardiovascular:      Rate and Rhythm: Normal rate and regular rhythm.      Pulses: Normal pulses.      Heart sounds: Normal heart sounds. No murmur heard.    No friction rub. No gallop.   Pulmonary:      Effort: Pulmonary effort is normal.      Breath sounds: Normal breath sounds. No wheezing or rhonchi.   Abdominal:      General: Abdomen is flat. Bowel sounds are normal. There is no " distension.      Palpations: Abdomen is soft. There is no mass.      Tenderness: There is no abdominal tenderness. There is no guarding or rebound.      Hernia: No hernia is present.   Musculoskeletal:         General: Normal range of motion.   Skin:     General: Skin is warm and dry.      Capillary Refill: Capillary refill takes less than 2 seconds.   Neurological:      General: No focal deficit present.      Mental Status: She is alert and oriented to person, place, and time.      Cranial Nerves: No cranial nerve deficit.   Psychiatric:         Mood and Affect: Mood and affect normal.         Behavior: Behavior normal.         Thought Content: Thought content normal.         Judgment: Judgment normal.        The following data was reviewed by: Jarrod Simms MD on 09/12/2023:  CMP          12/12/2022    10:33 4/28/2023    13:17   CMP   Glucose 91  95    BUN 15  19    Creatinine 0.85  0.81    EGFR 76.1  80.2    Sodium 141  141    Potassium 5.2  4.7    Chloride 107  107    Calcium 9.7  9.6    Total Protein 7.2  7.3    Albumin 4.30  3.8    Globulin 2.9  3.5    Total Bilirubin 0.3  <0.2    Alkaline Phosphatase 94  94    AST (SGOT) 19  13    ALT (SGPT) 17  15    Albumin/Globulin Ratio 1.5  1.1    BUN/Creatinine Ratio 17.6  23.5    Anion Gap 10.3  7.8        Lipid Panel          12/12/2022    10:33   Lipid Panel   Total Cholesterol 211    Triglycerides 135    HDL Cholesterol 63    VLDL Cholesterol 24    LDL Cholesterol  124    LDL/HDL Ratio 1.92      TSH          3/30/2023    12:07 4/28/2023    13:17 5/19/2023    12:38   TSH   TSH 7.140  0.198  0.478                 Assessment and Plan   Diagnoses and all orders for this visit:    1. Medicare annual wellness visit, subsequent (Primary)    2. Former tobacco use    3. Hypothyroidism, unspecified type  -     TSH+Free T4  -     levothyroxine (SYNTHROID, LEVOTHROID) 100 MCG tablet; Take 1 tablet by mouth Daily.  Dispense: 90 tablet; Refill: 1    4. Mixed hyperlipidemia  -      Comprehensive Metabolic Panel  -     Lipid Panel    5. Arthritis  -     amitriptyline (ELAVIL) 25 MG tablet; Take 1 tablet by mouth Every Night.  Dispense: 90 tablet; Refill: 1  -     celecoxib (CeleBREX) 200 MG capsule; Take 1 capsule by mouth Daily.  Dispense: 90 capsule; Refill: 1    6. Acute pain of right shoulder  -     baclofen (LIORESAL) 20 MG tablet; Take 1 tablet by mouth 3 (Three) Times a Day As Needed for Muscle Spasms.  Dispense: 270 tablet; Refill: 1    7. Acute right-sided thoracic back pain  -     baclofen (LIORESAL) 20 MG tablet; Take 1 tablet by mouth 3 (Three) Times a Day As Needed for Muscle Spasms.  Dispense: 270 tablet; Refill: 1    8. Bilateral carpal tunnel syndrome  -     celecoxib (CeleBREX) 200 MG capsule; Take 1 capsule by mouth Daily.  Dispense: 90 capsule; Refill: 1    9. Non-seasonal allergic rhinitis, unspecified trigger  -     fluticasone (FLONASE) 50 MCG/ACT nasal spray; 1 spray into the nostril(s) as directed by provider Daily.  Dispense: 48 g; Refill: 5    10. Moderate persistent asthma without complication  -     albuterol sulfate  (90 Base) MCG/ACT inhaler; Inhale 2 puffs Every 6 (Six) Hours As Needed for Wheezing.  Dispense: 18 g; Refill: 3  -     Symbicort 160-4.5 MCG/ACT inhaler; Inhale 2 puffs 2 (Two) Times a Day.  Dispense: 10.2 g; Refill: 3    11. Need for vaccination for pneumococcus  -     Pneumococcal Conjugate Vaccine 20-Valent (PCV20)             Follow Up   Return in about 6 months (around 3/12/2024) for Recheck.  Patient was given instructions and counseling regarding her condition or for health maintenance advice. Please see specific information pulled into the AVS if appropriate.

## 2023-09-12 NOTE — PROGRESS NOTES
Call pt:  Labs show hyperthyroidism so I have decreased synthroid to 88mcg daily, from 100mcg daily.  Start this dose and recheck thyroid labs in 1 month.  Cholesterol also elevated- really watch diet and exercise- follow-up if you have any questions.

## 2023-09-12 NOTE — PROGRESS NOTES
..  Venipuncture Blood Specimen Collection  Venipuncture performed in LT arm by Fior Goldberg with good hemostasis. Patient tolerated the procedure well without complications.   09/12/23   Barb Sims MA

## 2023-09-25 ENCOUNTER — TELEPHONE (OUTPATIENT)
Dept: VASCULAR SURGERY | Facility: HOSPITAL | Age: 68
End: 2023-09-25

## 2023-09-25 NOTE — TELEPHONE ENCOUNTER
PT IS DUE FOR HER YEARLY FOLLOW UP WITH DR CONNER BUT WE NEED TO SPEAK TO HER REGARDING HER INSURANCE BEFORE MAKING THE APPOINTMENT.   PLEASE TRANSFER TO THE OFFICE

## 2023-09-29 ENCOUNTER — HOSPITAL ENCOUNTER (OUTPATIENT)
Dept: MRI IMAGING | Facility: HOSPITAL | Age: 68
Discharge: HOME | End: 2023-09-29
Admitting: INTERNAL MEDICINE
Payer: MEDICARE

## 2023-09-29 DIAGNOSIS — M62.81 MUSCLE WEAKNESS (GENERALIZED): ICD-10-CM

## 2023-09-29 DIAGNOSIS — R74.8 ELEVATED CPK: ICD-10-CM

## 2023-09-29 PROCEDURE — 73718 MRI LOWER EXTREMITY W/O DYE: CPT

## 2023-10-09 DIAGNOSIS — M85.80 OSTEOPENIA, UNSPECIFIED LOCATION: ICD-10-CM

## 2023-11-06 DIAGNOSIS — E03.9 HYPOTHYROIDISM, UNSPECIFIED TYPE: ICD-10-CM

## 2023-11-06 RX ORDER — LEVOTHYROXINE SODIUM 88 UG/1
88 TABLET ORAL DAILY
Qty: 30 TABLET | Refills: 1 | OUTPATIENT
Start: 2023-11-06

## 2023-11-10 DIAGNOSIS — E03.9 HYPOTHYROIDISM, UNSPECIFIED TYPE: ICD-10-CM

## 2023-11-10 RX ORDER — LEVOTHYROXINE SODIUM 88 UG/1
88 TABLET ORAL DAILY
Qty: 30 TABLET | Refills: 1 | OUTPATIENT
Start: 2023-11-10

## 2023-11-10 NOTE — TELEPHONE ENCOUNTER
Pt needs Thyroid re-check. I sent her a [employer] message letting her know.

## 2023-11-13 ENCOUNTER — CLINICAL SUPPORT (OUTPATIENT)
Dept: FAMILY MEDICINE CLINIC | Facility: CLINIC | Age: 68
End: 2023-11-13
Payer: MEDICARE

## 2023-11-13 DIAGNOSIS — E03.9 HYPOTHYROIDISM, UNSPECIFIED TYPE: ICD-10-CM

## 2023-11-13 PROCEDURE — 36415 COLL VENOUS BLD VENIPUNCTURE: CPT | Performed by: FAMILY MEDICINE

## 2023-11-13 PROCEDURE — 84439 ASSAY OF FREE THYROXINE: CPT | Performed by: FAMILY MEDICINE

## 2023-11-13 PROCEDURE — 84443 ASSAY THYROID STIM HORMONE: CPT | Performed by: FAMILY MEDICINE

## 2023-11-13 NOTE — PROGRESS NOTES
Venipuncture Blood Specimen Collection  Venipuncture performed in left arm by Fior Goldberg with good hemostasis. Patient tolerated the procedure well without complications.   11/13/23   Stephany Siddiqui

## 2023-11-14 ENCOUNTER — HOSPITAL ENCOUNTER (OUTPATIENT)
Dept: CT IMAGING | Facility: HOSPITAL | Age: 68
Discharge: HOME | End: 2023-11-14
Admitting: FAMILY MEDICINE
Payer: MEDICARE

## 2023-11-14 DIAGNOSIS — Z87.891 PERSONAL HISTORY OF NICOTINE DEPENDENCE: ICD-10-CM

## 2023-11-14 DIAGNOSIS — Z72.0 TOBACCO ABUSE: ICD-10-CM

## 2023-11-14 LAB
T4 FREE SERPL-MCNC: 1.06 NG/DL (ref 0.93–1.7)
TSH SERPL DL<=0.05 MIU/L-ACNC: 0.88 UIU/ML (ref 0.27–4.2)

## 2023-11-14 PROCEDURE — 71271 CT THORAX LUNG CANCER SCR C-: CPT

## 2023-12-05 DIAGNOSIS — I87.2 VENOUS (PERIPHERAL) INSUFFICIENCY: ICD-10-CM

## 2023-12-05 DIAGNOSIS — M54.6 ACUTE RIGHT-SIDED THORACIC BACK PAIN: ICD-10-CM

## 2023-12-05 DIAGNOSIS — I83.893 VARICOSE VEINS OF BILATERAL LOWER EXTREMITIES WITH OTHER COMPLICATIONS: Primary | ICD-10-CM

## 2023-12-05 DIAGNOSIS — M25.511 ACUTE PAIN OF RIGHT SHOULDER: ICD-10-CM

## 2023-12-05 RX ORDER — DIOSMIN COMPLEX NO.1 630 MG
1 TABLET ORAL DAILY
Qty: 30 EACH | Refills: 11 | Status: SHIPPED | OUTPATIENT
Start: 2023-12-05 | End: 2024-11-29

## 2023-12-05 RX ORDER — BACLOFEN 10 MG/1
10 TABLET ORAL 3 TIMES DAILY
Qty: 270 TABLET | Refills: 1 | Status: SHIPPED | OUTPATIENT
Start: 2023-12-05

## 2024-01-03 ENCOUNTER — OFFICE VISIT (OUTPATIENT)
Dept: VASCULAR SURGERY | Facility: HOSPITAL | Age: 69
End: 2024-01-03
Payer: MEDICARE

## 2024-01-03 VITALS
TEMPERATURE: 97.8 F | SYSTOLIC BLOOD PRESSURE: 150 MMHG | OXYGEN SATURATION: 97 % | HEART RATE: 71 BPM | DIASTOLIC BLOOD PRESSURE: 60 MMHG | RESPIRATION RATE: 16 BRPM

## 2024-01-03 DIAGNOSIS — I87.2 VENOUS (PERIPHERAL) INSUFFICIENCY: ICD-10-CM

## 2024-01-03 DIAGNOSIS — I83.893 VARICOSE VEINS OF BILATERAL LOWER EXTREMITIES WITH OTHER COMPLICATIONS: Primary | ICD-10-CM

## 2024-01-03 PROCEDURE — G0463 HOSPITAL OUTPT CLINIC VISIT: HCPCS | Performed by: SURGERY

## 2024-01-03 RX ORDER — DIOSMIN COMPLEX NO.1 630 MG
1 TABLET ORAL DAILY
Qty: 30 EACH | Refills: 11 | Status: SHIPPED | OUTPATIENT
Start: 2024-01-03 | End: 2024-12-28

## 2024-01-03 NOTE — PROGRESS NOTES
[facility]   Follow up Office    Patient Name: Nora Bone  : 1955  MRN: [id number]  Primary Care Physician:  Jarrod Simms MD      Subjective   Subjective     HPI:    Nora Bone is a 68 y.o. female here to check on her leg veins.  She indicates that occasionally the veins around the right ankle causes discomfort especially when wearing shoes.  Some veins in the left calf puff up especially at night.  She feels the Vasculera is helping significantly.      Objective     Vitals:   Temp:  [97.8 °F (36.6 °C)] 97.8 °F (36.6 °C)  Heart Rate:  [71] 71  Resp:  [16] 16  BP: (150)/(60) 150/60    Physical Exam      General: Alert, no acute distress.  HEENT: PERRLA  Abdomen: Benign  Extremities: Symmetric.  Left calf: 2 medium size varicosities in the posterior medial calf.  Right ankle: Multiple telangiectasias.  Neuro: No gross deficits    Assessment & Plan   Assessment / Plan     Diagnoses and all orders for this visit:    1. Varicose veins of bilateral lower extremities with other complications (Primary)  -     Dietary Management Product (Vasculera) tablet; Take 1 each by mouth Daily for 360 days.  Dispense: 30 each; Refill: 11    2. Venous (peripheral) insufficiency  -     Dietary Management Product (Vasculera) tablet; Take 1 each by mouth Daily for 360 days.  Dispense: 30 each; Refill: 11       Assessment/Plan:   Ms. Bone has symptoms associated with her varicose veins.  She feels that her vascular is helping significantly.  I discussed management options with her to include some form of intervention for her veins for which she would be referred to [facility].  She is not interested in procedures at this time.  Will continue medical management.  Follow-up as needed.  I advised her that I have entered a prescription and refills for 1 year and if she prefers she can call us to renew it in a year from now.  She appears to understand and agrees with the plan.        Electronically signed by  Philipp Dunham MD, 01/03/24, 9:54 AM EST.

## 2024-02-14 ENCOUNTER — OFFICE VISIT (OUTPATIENT)
Dept: SURGERY | Facility: CLINIC | Age: 69
End: 2024-02-14
Payer: MEDICARE

## 2024-02-14 ENCOUNTER — PREP FOR SURGERY (OUTPATIENT)
Dept: OTHER | Facility: HOSPITAL | Age: 69
End: 2024-02-14
Payer: MEDICARE

## 2024-02-14 VITALS — BODY MASS INDEX: 22.79 KG/M2 | HEIGHT: 63 IN | RESPIRATION RATE: 14 BRPM | WEIGHT: 128.6 LBS

## 2024-02-14 DIAGNOSIS — M62.81 MUSCLE WEAKNESS: Primary | ICD-10-CM

## 2024-02-14 RX ORDER — HYDROCODONE BITARTRATE AND ACETAMINOPHEN 5; 325 MG/1; MG/1
TABLET ORAL
COMMUNITY
Start: 2023-11-22

## 2024-02-14 RX ORDER — CEFAZOLIN SODIUM 2 G/100ML
2000 INJECTION, SOLUTION INTRAVENOUS ONCE
OUTPATIENT
Start: 2024-02-14 | End: 2024-02-14

## 2024-02-14 RX ORDER — TRAMADOL HYDROCHLORIDE 50 MG/1
TABLET ORAL
COMMUNITY
Start: 2023-11-22

## 2024-02-14 RX ORDER — SODIUM CHLORIDE 0.9 % (FLUSH) 0.9 %
10 SYRINGE (ML) INJECTION AS NEEDED
OUTPATIENT
Start: 2024-02-14

## 2024-02-14 RX ORDER — SODIUM CHLORIDE 0.9 % (FLUSH) 0.9 %
10 SYRINGE (ML) INJECTION EVERY 12 HOURS SCHEDULED
OUTPATIENT
Start: 2024-02-14

## 2024-02-14 RX ORDER — SODIUM CHLORIDE 9 MG/ML
40 INJECTION, SOLUTION INTRAVENOUS AS NEEDED
OUTPATIENT
Start: 2024-02-14

## 2024-02-14 RX ORDER — LEVOTHYROXINE SODIUM 0.1 MG/1
100 TABLET ORAL DAILY
COMMUNITY

## 2024-02-14 RX ORDER — SODIUM CHLORIDE, SODIUM LACTATE, POTASSIUM CHLORIDE, CALCIUM CHLORIDE 600; 310; 30; 20 MG/100ML; MG/100ML; MG/100ML; MG/100ML
50 INJECTION, SOLUTION INTRAVENOUS CONTINUOUS
OUTPATIENT
Start: 2024-02-14

## 2024-02-15 NOTE — H&P (VIEW-ONLY)
General Surgery/Colorectal Surgery Note    Patient Name:  Nora Bone  YOB: 1955  [id number]    Referring Provider: Benson Shipley*      Patient Care Team:  Jarrod Simms MD as PCP - General (Family Medicine)    Chief complaint muscle weakness    Subjective .     History of present illness:    History of muscle weakness with elevated CK level coming in to discuss muscle biopsy to help with diagnosis.  No statin use.  No history of the same.  She thinks her right leg is more symptomatic than the left.  No recent chest pain.  No blood thinner use.      History:  Past Medical History:   Diagnosis Date    Anemia     Anxiety     Asthma     Back pain     Cancer     Cervical cancer     COPD (chronic obstructive pulmonary disease)     Deep vein thrombosis     Disease of thyroid gland     Diverticulosis     Hearing loss     Hyperlipidemia     Peripheral vascular disease        Past Surgical History:   Procedure Laterality Date    COLONOSCOPY W/ BIOPSIES      DILATATION AND CURETTAGE      VAGINAL HYSTERECTOMY      VEIN SURGERY Right        Family History   Problem Relation Age of Onset    Ovarian cancer Mother     Heart disease Mother     Thyroid disease Mother     Breast cancer Sister     Breast cancer Paternal Aunt     Epilepsy Father        Social History     Tobacco Use    Smoking status: Former     Packs/day: 1.00     Years: 20.00     Additional pack years: 0.00     Total pack years: 20.00     Types: Cigarettes     Quit date: 2015     Years since quittin.1     Passive exposure: Never    Smokeless tobacco: Never   Vaping Use    Vaping Use: Former   Substance Use Topics    Alcohol use: Never    Drug use: Yes     Types: Marijuana       Review of Systems  All systems were reviewed and negative except for:   Review of Systems   Constitutional: Negative for chills, fever and unexpected weight loss.   HENT: Negative for congestion, nosebleeds and voice change.    Eyes: Negative for  "blurred vision, double vision and discharge.   Respiratory: Negative for apnea, chest tightness and shortness of breath.    Cardiovascular: Negative for chest pain and leg swelling.   Gastrointestinal: Negative nausea vomiting diarrhea abdominal   Endocrine: Negative for cold intolerance and heat intolerance.   Genitourinary: Negative for dysuria, hematuria and urgency.   Musculoskeletal: Negative for back pain, joint swelling and neck pain.  See HPI  Skin: Negative for color change and dry skin.   Neurological: Negative for dizziness and confusion.  See HPI  Hematological: Negative for adenopathy.   Psychiatric/Behavioral: Negative for agitation and behavioral problems.     MEDS:  Prior to Admission medications    Medication Sig Start Date End Date Taking? Authorizing Provider   albuterol sulfate  (90 Base) MCG/ACT inhaler Inhale 2 puffs Every 6 (Six) Hours As Needed for Wheezing. 9/12/23  Yes Jarrod Simms MD   amitriptyline (ELAVIL) 25 MG tablet Take 1 tablet by mouth Every Night. 9/12/23  Yes Jarrod Simms MD   B-D 3CC LUER-LUCINA SYR 25GX1\" 25G X 1\" 3 ML misc use once monthly for b12 injection 11/18/22  Yes Jarrod Simms MD   Calcium Carb-Cholecalciferol (Oyster Shell Calcium w/D) 500-5 MG-MCG tablet TAKE 1 TABLET BY MOUTH TWICE DAILY 10/9/23  Yes Jarrod Simms MD   celecoxib (CeleBREX) 200 MG capsule Take 1 capsule by mouth Daily. 9/12/23  Yes Jarrod Simms MD   cyanocobalamin 1000 MCG/ML injection INJECT 1ML INTO THE APPROPRIATE MUSCLE AS DIRECTED BY PRESCRIBER EVERY 28 DAYS. 7/6/23  Yes Jarrod Simms MD   Dietary Management Product (Vasculera) tablet Take 1 each by mouth Daily for 360 days. 1/3/24 12/28/24 Yes Philipp Dunham MD   fluticasone (FLONASE) 50 MCG/ACT nasal spray 1 spray into the nostril(s) as directed by provider Daily. 9/12/23  Yes Jarrod Simms MD   levothyroxine (SYNTHROID, LEVOTHROID) 100 MCG tablet Take 1 tablet by mouth Daily.   " Yes Edna Damon MD   Probiotic Product (Onit PO) Take  by mouth.   Yes Edna Damon MD   Symbicort 160-4.5 MCG/ACT inhaler Inhale 2 puffs 2 (Two) Times a Day. 9/12/23  Yes Jarrod Simms MD   baclofen (LIORESAL) 10 MG tablet TAKE 1 TABLET BY MOUTH THREE TIMES DAILY  Patient not taking: Reported on 1/3/2024 12/5/23   Jarrod Simms MD   baclofen (LIORESAL) 20 MG tablet Take 1 tablet by mouth 3 (Three) Times a Day As Needed for Muscle Spasms.  Patient not taking: Reported on 1/3/2024 9/12/23   Jarrod Simms MD   Cyanocobalamin 1000 MCG/ML kit 1 mL Injection for 30 day(s)  Patient not taking: Reported on 2/14/2024    Edna Damon MD   Diclofenac Sodium (VOLTAREN) 1 % gel gel Apply 1 g topically to the appropriate area as directed Daily.  Patient not taking: Reported on 2/14/2024 3/28/23   Edna Damon MD   Dietary Management Product (VASCULERA PO) Daily.  Patient not taking: Reported on 2/14/2024    Edna Damon MD   HYDROcodone-acetaminophen (NORCO) 5-325 MG per tablet TAKE 1 TABLET BY MOUTH EVERY 6 HOURS AS NEEDED FOR PAIN MAY CAUSE DROWSINESS  Patient not taking: Reported on 2/14/2024 11/22/23   Edna Damon MD   levothyroxine (SYNTHROID, LEVOTHROID) 88 MCG tablet Take 1 tablet by mouth Daily.  Patient not taking: Reported on 2/14/2024 9/12/23   Jarrod Simms MD   traMADol (ULTRAM) 50 MG tablet TAKE 1 TABLET BY MOUTH EVERY 6 HOURS AS NEEDED FOR PAIN MAY CAUSE DROWSINESS  Patient not taking: Reported on 2/14/2024 11/22/23   dEna Damon MD        Allergies:  Hydrocodone-acetaminophen, Molds & smuts, and Pollen extract    Objective     Vital Signs   Resp:  [14] 14    Physical Exam:     General Appearance:    Alert, cooperative, in no acute distress   Head:    Normocephalic, without obvious abnormality, atraumatic   Eyes:          Conjunctivae and sclerae normal, no icterus,     Ears:    Ears appear intact  "with no abnormalities noted   Throat:   No oral lesions, no thrush, oral mucosa moist   Neck:   No adenopathy, supple, trachea midline, no thyromegaly   Back:     No kyphosis present, no scoliosis present, no skin lesions,      erythema or scars, no tenderness to percussion or                   palpation,   range of motion normal   Lungs:     Clear to auscultation,respirations regular, even and                  unlabored    Heart:    Regular rhythm and normal rate, normal S1 and S2, no            murmur, no gallop, no rub, no click   Chest Wall:    No abnormalities observed   Abdomen:     Normal bowel sounds, no masses, no organomegaly, soft        non-tender, non-distended, no guarding, no rebound                tenderness   Rectal:        Extremities:   Moves all extremities well, no edema, no cyanosis, no             redness   Pulses:   Pulses palpable and equal bilaterally   Skin:   No bleeding, bruising or rash   Lymph nodes:   No palpable adenopathy   Neurologic:   A/o x 4 with no deficits       Results Review:   {Results Review:33861::\"I reviewed the patient's new clinical results.\"    LABS/IMAGING:  Results for orders placed or performed in visit on 11/13/23   TSH+Free T4    Specimen: Blood   Result Value Ref Range    TSH 0.884 0.270 - 4.200 uIU/mL    Free T4 1.06 0.93 - 1.70 ng/dL        Result Review :     Assessment & Plan     Muscle weakness with history of elevated CK unknown etiology    Discussion with patient.  I recommended open muscle biopsy of her right thigh.  I explained the procedure and recovery.  Benefits and alternatives discussed.  Risk procedure including risk of anesthesia, bleeding, infection, need for more surgery, pain discussed.  All questions answered.  She agrees with the plan.  Orders placed.  She was instructed to use chlorhexidine the night before surgery.  Thank you for the consult.        This document has been electronically signed by Roque Dowd MD  February 15, 2024 " 09:10 EST

## 2024-02-16 ENCOUNTER — TELEPHONE (OUTPATIENT)
Dept: SURGERY | Facility: CLINIC | Age: 69
End: 2024-02-16
Payer: MEDICARE

## 2024-02-23 NOTE — PRE-PROCEDURE INSTRUCTIONS
PRE-OP INSTRUCTIONS REVIEWED WITH PATIENT: FASTING/BATHING/ARRIVAL PROCEDURES.  INSTRUCTED TO TAKE A.M. DAY OF SURGERY: ALBUTEROL INHAL AS NEEDED, TAKE: FLONASE, LEVOTHYROXINE, SYMBICORT INH.   UNDERSTANDING VERBALIZED.

## 2024-02-26 ENCOUNTER — HOSPITAL ENCOUNTER (OUTPATIENT)
Facility: HOSPITAL | Age: 69
Setting detail: HOSPITAL OUTPATIENT SURGERY
Discharge: HOME | End: 2024-02-26
Attending: SURGERY | Admitting: SURGERY
Payer: MEDICARE

## 2024-02-26 ENCOUNTER — ANESTHESIA (OUTPATIENT)
Dept: PERIOP | Facility: HOSPITAL | Age: 69
End: 2024-02-26
Payer: MEDICARE

## 2024-02-26 ENCOUNTER — ANESTHESIA EVENT (OUTPATIENT)
Dept: PERIOP | Facility: HOSPITAL | Age: 69
End: 2024-02-26
Payer: MEDICARE

## 2024-02-26 VITALS
WEIGHT: 128.97 LBS | OXYGEN SATURATION: 100 % | BODY MASS INDEX: 22.85 KG/M2 | TEMPERATURE: 97.2 F | SYSTOLIC BLOOD PRESSURE: 153 MMHG | DIASTOLIC BLOOD PRESSURE: 82 MMHG | HEIGHT: 63 IN | HEART RATE: 62 BPM | RESPIRATION RATE: 18 BRPM

## 2024-02-26 DIAGNOSIS — M62.81 MUSCLE WEAKNESS: ICD-10-CM

## 2024-02-26 PROCEDURE — 25010000002 MIDAZOLAM PER 1MG: Performed by: ANESTHESIOLOGY

## 2024-02-26 PROCEDURE — 25010000002 DEXAMETHASONE SODIUM PHOSPHATE 100 MG/10ML SOLUTION: Performed by: SURGERY

## 2024-02-26 PROCEDURE — 25010000002 BUPRENORPHINE PER 0.1 MG: Performed by: SURGERY

## 2024-02-26 PROCEDURE — 25010000002 CEFAZOLIN IN DEXTROSE 2000 MG/ 100 ML SOLUTION: Performed by: SURGERY

## 2024-02-26 PROCEDURE — 20205 DEEP MUSCLE BIOPSY: CPT | Performed by: SURGERY

## 2024-02-26 PROCEDURE — 25010000002 PROPOFOL 10 MG/ML EMULSION: Performed by: NURSE ANESTHETIST, CERTIFIED REGISTERED

## 2024-02-26 PROCEDURE — 25810000003 LACTATED RINGERS PER 1000 ML: Performed by: ANESTHESIOLOGY

## 2024-02-26 PROCEDURE — 25010000002 BUPIVACAINE (PF) 0.25 % SOLUTION: Performed by: SURGERY

## 2024-02-26 PROCEDURE — 88300 SURGICAL PATH GROSS: CPT | Performed by: SURGERY

## 2024-02-26 PROCEDURE — 25010000002 FENTANYL CITRATE (PF) 50 MCG/ML SOLUTION: Performed by: NURSE ANESTHETIST, CERTIFIED REGISTERED

## 2024-02-26 RX ORDER — PROMETHAZINE HYDROCHLORIDE 12.5 MG/1
25 TABLET ORAL ONCE AS NEEDED
Status: DISCONTINUED | OUTPATIENT
Start: 2024-02-26 | End: 2024-02-26 | Stop reason: HOSPADM

## 2024-02-26 RX ORDER — MAGNESIUM HYDROXIDE 1200 MG/15ML
LIQUID ORAL AS NEEDED
Status: DISCONTINUED | OUTPATIENT
Start: 2024-02-26 | End: 2024-02-26 | Stop reason: HOSPADM

## 2024-02-26 RX ORDER — PROMETHAZINE HYDROCHLORIDE 25 MG/1
25 SUPPOSITORY RECTAL ONCE AS NEEDED
Status: DISCONTINUED | OUTPATIENT
Start: 2024-02-26 | End: 2024-02-26 | Stop reason: HOSPADM

## 2024-02-26 RX ORDER — SODIUM CHLORIDE 0.9 % (FLUSH) 0.9 %
10 SYRINGE (ML) INJECTION AS NEEDED
Status: DISCONTINUED | OUTPATIENT
Start: 2024-02-26 | End: 2024-02-26 | Stop reason: HOSPADM

## 2024-02-26 RX ORDER — SODIUM CHLORIDE 0.9 % (FLUSH) 0.9 %
10 SYRINGE (ML) INJECTION EVERY 12 HOURS SCHEDULED
Status: DISCONTINUED | OUTPATIENT
Start: 2024-02-26 | End: 2024-02-26 | Stop reason: HOSPADM

## 2024-02-26 RX ORDER — OXYCODONE HYDROCHLORIDE AND ACETAMINOPHEN 5; 325 MG/1; MG/1
1 TABLET ORAL EVERY 6 HOURS PRN
Qty: 6 TABLET | Refills: 0 | Status: SHIPPED | OUTPATIENT
Start: 2024-02-26

## 2024-02-26 RX ORDER — OXYCODONE HYDROCHLORIDE 5 MG/1
5 TABLET ORAL
Status: DISCONTINUED | OUTPATIENT
Start: 2024-02-26 | End: 2024-02-26 | Stop reason: HOSPADM

## 2024-02-26 RX ORDER — POLYETHYLENE GLYCOL 3350 17 G/17G
17 POWDER, FOR SOLUTION ORAL DAILY
Qty: 3 PACKET | Refills: 0 | Status: SHIPPED | OUTPATIENT
Start: 2024-02-26

## 2024-02-26 RX ORDER — LIDOCAINE HYDROCHLORIDE 20 MG/ML
INJECTION, SOLUTION EPIDURAL; INFILTRATION; INTRACAUDAL; PERINEURAL AS NEEDED
Status: DISCONTINUED | OUTPATIENT
Start: 2024-02-26 | End: 2024-02-26 | Stop reason: SURG

## 2024-02-26 RX ORDER — ACETAMINOPHEN 500 MG
1000 TABLET ORAL ONCE
Status: COMPLETED | OUTPATIENT
Start: 2024-02-26 | End: 2024-02-26

## 2024-02-26 RX ORDER — SODIUM CHLORIDE, SODIUM LACTATE, POTASSIUM CHLORIDE, CALCIUM CHLORIDE 600; 310; 30; 20 MG/100ML; MG/100ML; MG/100ML; MG/100ML
9 INJECTION, SOLUTION INTRAVENOUS CONTINUOUS PRN
Status: DISCONTINUED | OUTPATIENT
Start: 2024-02-26 | End: 2024-02-26 | Stop reason: HOSPADM

## 2024-02-26 RX ORDER — ONDANSETRON 2 MG/ML
4 INJECTION INTRAMUSCULAR; INTRAVENOUS ONCE AS NEEDED
Status: DISCONTINUED | OUTPATIENT
Start: 2024-02-26 | End: 2024-02-26 | Stop reason: HOSPADM

## 2024-02-26 RX ORDER — SODIUM CHLORIDE, SODIUM LACTATE, POTASSIUM CHLORIDE, CALCIUM CHLORIDE 600; 310; 30; 20 MG/100ML; MG/100ML; MG/100ML; MG/100ML
50 INJECTION, SOLUTION INTRAVENOUS CONTINUOUS
Status: DISCONTINUED | OUTPATIENT
Start: 2024-02-26 | End: 2024-02-26 | Stop reason: HOSPADM

## 2024-02-26 RX ORDER — SCOLOPAMINE TRANSDERMAL SYSTEM 1 MG/1
1 PATCH, EXTENDED RELEASE TRANSDERMAL ONCE
Status: DISCONTINUED | OUTPATIENT
Start: 2024-02-26 | End: 2024-02-26 | Stop reason: HOSPADM

## 2024-02-26 RX ORDER — PROPOFOL 10 MG/ML
VIAL (ML) INTRAVENOUS AS NEEDED
Status: DISCONTINUED | OUTPATIENT
Start: 2024-02-26 | End: 2024-02-26 | Stop reason: SURG

## 2024-02-26 RX ORDER — FENTANYL CITRATE 50 UG/ML
INJECTION, SOLUTION INTRAMUSCULAR; INTRAVENOUS AS NEEDED
Status: DISCONTINUED | OUTPATIENT
Start: 2024-02-26 | End: 2024-02-26 | Stop reason: SURG

## 2024-02-26 RX ORDER — MIDAZOLAM HYDROCHLORIDE 2 MG/2ML
2 INJECTION, SOLUTION INTRAMUSCULAR; INTRAVENOUS ONCE
Status: COMPLETED | OUTPATIENT
Start: 2024-02-26 | End: 2024-02-26

## 2024-02-26 RX ORDER — CEFAZOLIN SODIUM 2 G/100ML
2000 INJECTION, SOLUTION INTRAVENOUS ONCE
Status: COMPLETED | OUTPATIENT
Start: 2024-02-26 | End: 2024-02-26

## 2024-02-26 RX ORDER — SODIUM CHLORIDE 9 MG/ML
40 INJECTION, SOLUTION INTRAVENOUS AS NEEDED
Status: DISCONTINUED | OUTPATIENT
Start: 2024-02-26 | End: 2024-02-26 | Stop reason: HOSPADM

## 2024-02-26 RX ORDER — MEPERIDINE HYDROCHLORIDE 25 MG/ML
12.5 INJECTION INTRAMUSCULAR; INTRAVENOUS; SUBCUTANEOUS
Status: DISCONTINUED | OUTPATIENT
Start: 2024-02-26 | End: 2024-02-26 | Stop reason: HOSPADM

## 2024-02-26 RX ADMIN — SODIUM CHLORIDE, POTASSIUM CHLORIDE, SODIUM LACTATE AND CALCIUM CHLORIDE 9 ML/HR: 600; 310; 30; 20 INJECTION, SOLUTION INTRAVENOUS at 07:19

## 2024-02-26 RX ADMIN — ACETAMINOPHEN 1000 MG: 500 TABLET ORAL at 07:18

## 2024-02-26 RX ADMIN — LIDOCAINE HYDROCHLORIDE 60 MG: 20 INJECTION, SOLUTION INTRAVENOUS at 07:26

## 2024-02-26 RX ADMIN — SCOPALAMINE 1 PATCH: 1 PATCH, EXTENDED RELEASE TRANSDERMAL at 07:18

## 2024-02-26 RX ADMIN — MIDAZOLAM HYDROCHLORIDE 2 MG: 1 INJECTION, SOLUTION INTRAMUSCULAR; INTRAVENOUS at 07:18

## 2024-02-26 RX ADMIN — PROPOFOL 225 MCG/KG/MIN: 10 INJECTION, EMULSION INTRAVENOUS at 07:26

## 2024-02-26 RX ADMIN — PROPOFOL 50 MG: 10 INJECTION, EMULSION INTRAVENOUS at 07:26

## 2024-02-26 RX ADMIN — FENTANYL CITRATE 25 MCG: 50 INJECTION, SOLUTION INTRAMUSCULAR; INTRAVENOUS at 07:26

## 2024-02-26 RX ADMIN — FENTANYL CITRATE 25 MCG: 50 INJECTION, SOLUTION INTRAMUSCULAR; INTRAVENOUS at 07:37

## 2024-02-26 RX ADMIN — FENTANYL CITRATE 25 MCG: 50 INJECTION, SOLUTION INTRAMUSCULAR; INTRAVENOUS at 07:34

## 2024-02-26 RX ADMIN — CEFAZOLIN SODIUM 2 G: 2 INJECTION, SOLUTION INTRAVENOUS at 07:21

## 2024-02-26 NOTE — ANESTHESIA POSTPROCEDURE EVALUATION
Patient: Nora Bone    Procedure Summary       Date: 02/26/24 Room / Location: [facility] OR [address] [place] [address] OR [facility]    Anesthesia Start: 0721 Anesthesia Stop: 0758    Procedure: MUSCLE BIOPSY right thigh (Right: Thigh) Diagnosis:       Muscle weakness      (Muscle weakness [M62.81])    Surgeons: Roque Dowd MD Provider: Geena Sanches CRNA    Anesthesia Type: general ASA Status: 3            Anesthesia Type: general    Vitals  Vitals Value Taken Time   /73 02/26/24 0825   Temp 36.1 °C (97 °F) 02/26/24 0811   Pulse 61 02/26/24 0815   Resp 18 02/26/24 0811   SpO2 96 % 02/26/24 0825   Vitals shown include unfiled device data.        Post Anesthesia Care and Evaluation    Patient location during evaluation: PACU  Patient participation: complete - patient participated  Level of consciousness: awake and awake and alert  Pain score: 1  Pain management: adequate    Airway patency: patent  Anesthetic complications: No anesthetic complications  PONV Status: none  Cardiovascular status: acceptable  Respiratory status: acceptable  Hydration status: acceptable

## 2024-02-26 NOTE — DISCHARGE INSTRUCTIONS
DISCHARGE INSTRUCTIONS  SURGICAL / AMBULATORY  PROCEDURES      For your surgery you had:  Monitored anesthesia Care  You may experience dizziness, drowsiness, or light-headedness for several hours following surgery/procedure.  Do not stay alone today or tonight.  Limit your activity for 24 hours.  Resume your diet slowly.  Follow whatever special dietary instructions you may have been given by your doctor.  You should not drive or operate machinery, drink alcohol, or sign legally binding documents for 24 hours or while you are taking pain medication.  Last dose of pain medication was given at:   .  NOTIFY YOUR DOCTOR IF YOU EXPERIENCE ANY OF THE FOLLOWING:  Temperature greater than 101 degrees Fahrenheit  Shaking Chills  Redness or excessive drainage from incision  Nausea, vomiting and/or pain that is not controlled by prescribed medications  Increase in bleeding or bleeding that is excessive  Unable to urinate in 6 hours after surgery  If unable to reach your doctor, please go to the closest Emergency Room  You may shower in 2 days wed no tub baths for 2 wks  .  Apply an ice pack 24-48 hours.  Medications per physician instructions as indicated on Discharge Medication Information Sheet.  SPECIAL INSTRUCTIONS:  U have skin adhesive will adhesive will eventually flake off       May take an over the counter stool softener as needed      No lifting over 5-10 lbs about a gallon of milk      Gentle stretching

## 2024-02-26 NOTE — OP NOTE
OP NOTE  MUSCLE BIOPSY  Procedure Report    Patient Name:  Nora Bone  YOB: 1955  [id number]    Date of Surgery:  2/26/2024     Indications: See last clinic note for indications, discussion of risk benefits and alternatives    Pre-op Diagnosis:   Muscle weakness [M62.81]       Post-Op Diagnosis Codes:     * Muscle weakness [M62.81]    Procedure/CPT® Codes:      Procedure(s):  Open excisional MUSCLE BIOPSY right thigh    Staff:  Surgeon(s):  Roque Dowd MD    Assistant: Amy Stone CSA    Anesthesia: Monitored Anesthesia Care, Local    Estimated Blood Loss: minimal    Implants:    Nothing was implanted during the procedure    Specimen:          Specimens       ID Source Type Tests Collected By Collected At Frozen?    A Thigh, Right Tissue TISSUE PATHOLOGY EXAM   Rouqe Dowd MD 2/26/24 0741     Description: right thigh muscle biopsy    Comment: Small amount sterile saline on tissue              Findings: Open excisional muscle biopsy right vastus lateralis    Complications: None    Description of Procedure:   After all questions were answered, consent was verified, site was marked.  Patient brought to the operating room per stretcher placed in supine position arms out all extremities padded.  Bilateral lower extremity SCDs placed.  Anesthesia induced.  Preoperative IV antibiotics administered.  Patient's right thigh prepped with ChloraPrep.  We waited 3 minutes.  We draped in usual sterile fashion.  I then infiltrated with local anesthesia.  I made a longitudinal incision over the vastus lateralis.  I dissected down to the fascia.  I placed a self-retaining retractor.  I incised the fascia sharply.  I then excised 2 portions of muscle sharply with the sent to pathology per the directions given by pathology.  I obtained hemostasis with Bovie electrocautery.  I reapproximated the fascia with Vicryl.  I closed the incision with Vicryl Monocryl and skin glue.  I  infiltrated with local.  At the end of the procedure all counts were correct.  I was present for the procedure.    Assistant: Amy Stone CSA was responsible for performing the following activities: Retraction, Closing, and Placing Dressing and their skilled assistance was necessary for the success of this case.    Roque Dowd MD     Date: 2/26/2024  Time: 07:56 EST

## 2024-02-26 NOTE — ANESTHESIA PREPROCEDURE EVALUATION
Anesthesia Evaluation     Patient summary reviewed and Nursing notes reviewed   no history of anesthetic complications:   NPO Solid Status: > 8 hours  NPO Liquid Status: > 2 hours           Airway   Mallampati: I  TM distance: >3 FB  Neck ROM: full  Dental    (+) edentulous    Pulmonary - normal exam   (+) a smoker Former, COPD, asthma,  Cardiovascular - normal exam  Exercise tolerance: good (4-7 METS)    (+) PVD, hyperlipidemia      Neuro/Psych- negative ROS  GI/Hepatic/Renal/Endo    (+) GERD, thyroid problem     Musculoskeletal     (+) back pain  Abdominal  - normal exam   Substance History - negative use     OB/GYN negative ob/gyn ROS         Other   arthritis,   history of cancer    ROS/Med Hx Other: PAT Nursing Notes unavailable.               Anesthesia Plan    ASA 3     general     intravenous induction     Anesthetic plan, risks, benefits, and alternatives have been provided, discussed and informed consent has been obtained with: patient.    Plan discussed with CRNA.    CODE STATUS:

## 2024-02-27 ENCOUNTER — TELEPHONE (OUTPATIENT)
Dept: SURGERY | Facility: CLINIC | Age: 69
End: 2024-02-27
Payer: MEDICARE

## 2024-02-27 DIAGNOSIS — M79.604 PAIN OF RIGHT LOWER EXTREMITY: Primary | ICD-10-CM

## 2024-02-27 RX ORDER — TRAMADOL HYDROCHLORIDE 50 MG/1
50 TABLET ORAL EVERY 6 HOURS PRN
Qty: 12 TABLET | Refills: 0 | Status: SHIPPED | OUTPATIENT
Start: 2024-02-27 | End: 2025-02-26

## 2024-02-27 NOTE — TELEPHONE ENCOUNTER
Patient had muscle biopsy yesterday. You sent her home with Percocet and she cannot sleep. She said that she has not been asleep since she got home and says its because of the percocet. [facility] [place]

## 2024-02-29 LAB
CYTO UR: NORMAL
LAB AP CASE REPORT: NORMAL
LAB AP CLINICAL INFORMATION: NORMAL
LAB AP DIAGNOSIS COMMENT: NORMAL
PATH REPORT.FINAL DX SPEC: NORMAL
PATH REPORT.GROSS SPEC: NORMAL

## 2024-03-04 DIAGNOSIS — M19.90 ARTHRITIS: ICD-10-CM

## 2024-03-04 RX ORDER — AMITRIPTYLINE HYDROCHLORIDE 25 MG/1
25 TABLET, FILM COATED ORAL NIGHTLY
Qty: 90 TABLET | Refills: 1 | OUTPATIENT
Start: 2024-03-04

## 2024-03-11 ENCOUNTER — OFFICE VISIT (OUTPATIENT)
Dept: FAMILY MEDICINE CLINIC | Facility: CLINIC | Age: 69
End: 2024-03-11
Payer: MEDICARE

## 2024-03-11 VITALS
TEMPERATURE: 97.6 F | HEART RATE: 77 BPM | WEIGHT: 130.4 LBS | SYSTOLIC BLOOD PRESSURE: 150 MMHG | DIASTOLIC BLOOD PRESSURE: 80 MMHG | OXYGEN SATURATION: 100 % | BODY MASS INDEX: 23.1 KG/M2

## 2024-03-11 DIAGNOSIS — E03.9 HYPOTHYROIDISM, UNSPECIFIED TYPE: ICD-10-CM

## 2024-03-11 DIAGNOSIS — G56.03 BILATERAL CARPAL TUNNEL SYNDROME: ICD-10-CM

## 2024-03-11 DIAGNOSIS — M79.604 RIGHT LEG PAIN: Primary | ICD-10-CM

## 2024-03-11 DIAGNOSIS — M19.90 ARTHRITIS: ICD-10-CM

## 2024-03-11 DIAGNOSIS — E78.2 MIXED HYPERLIPIDEMIA: ICD-10-CM

## 2024-03-11 DIAGNOSIS — J45.40 MODERATE PERSISTENT ASTHMA WITHOUT COMPLICATION: ICD-10-CM

## 2024-03-11 LAB
ALBUMIN SERPL-MCNC: 4.6 G/DL (ref 3.5–5.2)
ALBUMIN/GLOB SERPL: 1.7 G/DL
ALP SERPL-CCNC: 103 U/L (ref 39–117)
ALT SERPL W P-5'-P-CCNC: 23 U/L (ref 1–33)
ANION GAP SERPL CALCULATED.3IONS-SCNC: 9 MMOL/L (ref 5–15)
AST SERPL-CCNC: 27 U/L (ref 1–32)
BASOPHILS # BLD AUTO: 0.1 10*3/MM3 (ref 0–0.2)
BASOPHILS NFR BLD AUTO: 1.2 % (ref 0–1.5)
BILIRUB SERPL-MCNC: 0.3 MG/DL (ref 0–1.2)
BUN SERPL-MCNC: 14 MG/DL (ref 8–23)
BUN/CREAT SERPL: 15.1 (ref 7–25)
CALCIUM SPEC-SCNC: 9.5 MG/DL (ref 8.6–10.5)
CHLORIDE SERPL-SCNC: 106 MMOL/L (ref 98–107)
CHOLEST SERPL-MCNC: 245 MG/DL (ref 0–200)
CO2 SERPL-SCNC: 27 MMOL/L (ref 22–29)
CREAT SERPL-MCNC: 0.93 MG/DL (ref 0.57–1)
DEPRECATED RDW RBC AUTO: 43.4 FL (ref 37–54)
EGFRCR SERPLBLD CKD-EPI 2021: 67.5 ML/MIN/1.73
EOSINOPHIL # BLD AUTO: 0.27 10*3/MM3 (ref 0–0.4)
EOSINOPHIL NFR BLD AUTO: 3.1 % (ref 0.3–6.2)
ERYTHROCYTE [DISTWIDTH] IN BLOOD BY AUTOMATED COUNT: 13.7 % (ref 12.3–15.4)
GLOBULIN UR ELPH-MCNC: 2.7 GM/DL
GLUCOSE SERPL-MCNC: 88 MG/DL (ref 65–99)
HCT VFR BLD AUTO: 45.4 % (ref 34–46.6)
HDLC SERPL-MCNC: 54 MG/DL (ref 40–60)
HGB BLD-MCNC: 15.4 G/DL (ref 12–15.9)
IMM GRANULOCYTES # BLD AUTO: 0.02 10*3/MM3 (ref 0–0.05)
IMM GRANULOCYTES NFR BLD AUTO: 0.2 % (ref 0–0.5)
LDLC SERPL CALC-MCNC: 159 MG/DL (ref 0–100)
LDLC/HDLC SERPL: 2.89 {RATIO}
LYMPHOCYTES # BLD AUTO: 2.94 10*3/MM3 (ref 0.7–3.1)
LYMPHOCYTES NFR BLD AUTO: 34 % (ref 19.6–45.3)
MCH RBC QN AUTO: 29.7 PG (ref 26.6–33)
MCHC RBC AUTO-ENTMCNC: 33.9 G/DL (ref 31.5–35.7)
MCV RBC AUTO: 87.5 FL (ref 79–97)
MONOCYTES # BLD AUTO: 0.66 10*3/MM3 (ref 0.1–0.9)
MONOCYTES NFR BLD AUTO: 7.6 % (ref 5–12)
NEUTROPHILS NFR BLD AUTO: 4.66 10*3/MM3 (ref 1.7–7)
NEUTROPHILS NFR BLD AUTO: 53.9 % (ref 42.7–76)
NRBC BLD AUTO-RTO: 0 /100 WBC (ref 0–0.2)
PLATELET # BLD AUTO: 260 10*3/MM3 (ref 140–450)
PMV BLD AUTO: 11.9 FL (ref 6–12)
POTASSIUM SERPL-SCNC: 4.9 MMOL/L (ref 3.5–5.2)
PROT SERPL-MCNC: 7.3 G/DL (ref 6–8.5)
RBC # BLD AUTO: 5.19 10*6/MM3 (ref 3.77–5.28)
SODIUM SERPL-SCNC: 142 MMOL/L (ref 136–145)
T4 FREE SERPL-MCNC: 1.81 NG/DL (ref 0.93–1.7)
TRIGL SERPL-MCNC: 174 MG/DL (ref 0–150)
TSH SERPL DL<=0.05 MIU/L-ACNC: 0.25 UIU/ML (ref 0.27–4.2)
VLDLC SERPL-MCNC: 32 MG/DL (ref 5–40)
WBC NRBC COR # BLD AUTO: 8.65 10*3/MM3 (ref 3.4–10.8)

## 2024-03-11 PROCEDURE — 85025 COMPLETE CBC W/AUTO DIFF WBC: CPT | Performed by: FAMILY MEDICINE

## 2024-03-11 PROCEDURE — 84443 ASSAY THYROID STIM HORMONE: CPT | Performed by: FAMILY MEDICINE

## 2024-03-11 PROCEDURE — 84439 ASSAY OF FREE THYROXINE: CPT | Performed by: FAMILY MEDICINE

## 2024-03-11 PROCEDURE — 80053 COMPREHEN METABOLIC PANEL: CPT | Performed by: FAMILY MEDICINE

## 2024-03-11 PROCEDURE — 80061 LIPID PANEL: CPT | Performed by: FAMILY MEDICINE

## 2024-03-11 RX ORDER — LEVOTHYROXINE SODIUM 0.1 MG/1
100 TABLET ORAL DAILY
Qty: 90 TABLET | Refills: 1 | Status: SHIPPED | OUTPATIENT
Start: 2024-03-11 | End: 2024-03-11 | Stop reason: SDUPTHER

## 2024-03-11 RX ORDER — ALBUTEROL SULFATE 90 UG/1
2 AEROSOL, METERED RESPIRATORY (INHALATION) EVERY 6 HOURS PRN
Qty: 18 G | Refills: 3 | Status: SHIPPED | OUTPATIENT
Start: 2024-03-11

## 2024-03-11 RX ORDER — LEVOTHYROXINE SODIUM 88 UG/1
88 TABLET ORAL DAILY
Qty: 30 TABLET | Refills: 1 | Status: SHIPPED | OUTPATIENT
Start: 2024-03-11

## 2024-03-11 RX ORDER — BUDESONIDE AND FORMOTEROL FUMARATE DIHYDRATE 160; 4.5 UG/1; UG/1
2 AEROSOL RESPIRATORY (INHALATION) 2 TIMES DAILY
Qty: 10.2 G | Refills: 3 | Status: SHIPPED | OUTPATIENT
Start: 2024-03-11

## 2024-03-11 RX ORDER — TRAMADOL HYDROCHLORIDE 50 MG/1
50 TABLET ORAL EVERY 6 HOURS PRN
Qty: 12 TABLET | Refills: 0 | Status: SHIPPED | OUTPATIENT
Start: 2024-03-11 | End: 2025-03-11

## 2024-03-11 NOTE — PROGRESS NOTES
Call pt:  Labs showed elevated cholesterol and hyperthyroidism.  I sent in decreased dose of synthroid, down to 88mcg daily, from 100mcg daily.  Take new dose and recheck thyroid labs in 1 month.  Really watch diet and exercise for cholesterol.  Follow-up if you have any questions.

## 2024-03-11 NOTE — PROGRESS NOTES
Chief Complaint  Hypothyroidism, Hospital Follow Up Visit (Muscle bx right thigh), and Hypertension    Subjective          Nora Bone presents to [facility]  History of Present Illness  Pt having some pain from recent biopsy- needing refill on ultram    Asthma- controlled on current meds    Pt recently had CT chest in November 2023 and was normal at that time.  Hypothyroidism  This is a chronic problem. The current episode started more than 1 year ago. The problem occurs intermittently. The problem has been gradually improving. Pertinent negatives include no abdominal pain, anorexia, arthralgias, change in bowel habit, chest pain, chills, congestion, coughing, diaphoresis, fatigue, fever, headaches, joint swelling, myalgias, nausea, neck pain, numbness, rash, sore throat, swollen glands, urinary symptoms, vertigo, visual change, vomiting or weakness. Nothing aggravates the symptoms. Treatments tried: synthroid. The treatment provided significant relief.   Hypertension  This is a chronic problem. The current episode started more than 1 year ago. The problem has been improved since onset. The problem is controlled. Pertinent negatives include no anxiety, blurred vision, chest pain, headaches, malaise/fatigue, neck pain, orthopnea, palpitations, peripheral edema, PND, shortness of breath or sweats. Risk factors for coronary artery disease include dyslipidemia, family history and post-menopausal state. Current antihypertension treatment includes lifestyle changes. The current treatment provides significant improvement. There are no compliance problems.    Asthma  There is no chest tightness, cough, difficulty breathing, frequent throat clearing, hemoptysis, hoarse voice, shortness of breath, sputum production or wheezing. This is a chronic problem. The current episode started more than 1 year ago. The problem occurs intermittently. The problem has been gradually improving. Pertinent  negatives include no appetite change, chest pain, dyspnea on exertion, ear congestion, ear pain, fever, headaches, heartburn, malaise/fatigue, myalgias, nasal congestion, orthopnea, PND, postnasal drip, rhinorrhea, sneezing, sore throat, sweats, trouble swallowing or weight loss. Her symptoms are alleviated by steroid inhaler and beta-agonist. She reports significant improvement on treatment. Her past medical history is significant for asthma.       BMI is within normal parameters. No other follow-up for BMI required.       Objective   Allergies   Allergen Reactions    Hydrocodone-Acetaminophen Shortness Of Breath and Nausea And Vomiting     Also reports shaking       Pollen Extract Shortness Of Breath    Percocet [Oxycodone-Acetaminophen] Other (See Comments)     PT REQUEST PERCOCET BE ADDED TO ALLERGY LIST/PT CANNOT SLEEP WHILE TAKING THIS MEDICINE     Molds & Smuts Rash     Immunization History   Administered Date(s) Administered    ABRYSVO (RSV, 60+ or pregnant women 32-36 wks) 01/06/2024    COVID-19 (MODERNA) 1st,2nd,3rd Dose Monovalent 08/20/2021, 09/17/2021    Flu Vaccine Quad PF >36MO 02/07/2017, 09/27/2018, 10/15/2019, 01/12/2021    Fluzone (or Fluarix & Flulaval for VFC) >6mos 02/07/2017, 10/28/2021    Fluzone High-Dose 65+yrs 11/04/2022    Hepatitis A 03/13/2018, 09/13/2018    Pneumococcal Conjugate 20-Valent (PCV20) 09/12/2023    Pneumococcal Polysaccharide (PPSV23) 10/28/2021    Tdap 09/27/2018     Past Medical History:   Diagnosis Date    Anemia     NO CURRENT ISSUES GETS MONTHLY SHOTS    Anxiety     Asthma     Back pain     OCCASSIONAL    Cervical cancer     COPD (chronic obstructive pulmonary disease)     no O2 use    Deep vein thrombosis     NO CURRENT ISSUES    Disease of thyroid gland     Diverticulosis     Hearing loss     Hyperlipidemia     Muscle weakness     detereration    Peripheral vascular disease     WEARS SUPPORT HOSE/R VARICOSE VEINS      Past Surgical History:   Procedure Laterality  "Date    CARPAL TUNNEL RELEASE Bilateral     COLONOSCOPY W/ BIOPSIES      DILATATION AND CURETTAGE      MUSCLE BIOPSY Right 2024    Procedure: MUSCLE BIOPSY right thigh;  Surgeon: Roque Dowd MD;  [place] [facility] [place] [facility] [place]  Service: General;  Laterality: Right;    VAGINAL HYSTERECTOMY      VEIN SURGERY Right     STRIPPING      Social History     Socioeconomic History    Marital status: Single   Tobacco Use    Smoking status: Former     Current packs/day: 0.00     Average packs/day: 1 pack/day for 20.0 years (20.0 ttl pk-yrs)     Types: Cigarettes     Start date: 1995     Quit date: 2015     Years since quittin.1     Passive exposure: Never    Smokeless tobacco: Never   Vaping Use    Vaping status: Former   Substance and Sexual Activity    Alcohol use: Never    Drug use: Yes     Types: Marijuana     Comment: OCCASSIONAL PRN PAIN/ANXIETY    Sexual activity: Defer     Birth control/protection: Surgical        Current Outpatient Medications:     albuterol sulfate  (90 Base) MCG/ACT inhaler, Inhale 2 puffs Every 6 (Six) Hours As Needed for Wheezing., Disp: 18 g, Rfl: 3    amitriptyline (ELAVIL) 25 MG tablet, Take 1 tablet by mouth Every Night., Disp: 90 tablet, Rfl: 1    B-D 3CC LUER-LUCINA SYR 25GX1\" 25G X 1\" 3 ML misc, use once monthly for b12 injection, Disp: 3 each, Rfl: 0    Calcium Carb-Cholecalciferol (Oyster Shell Calcium w/D) 500-5 MG-MCG tablet, TAKE 1 TABLET BY MOUTH TWICE DAILY, Disp: 180 tablet, Rfl: 0    celecoxib (CeleBREX) 200 MG capsule, Take 1 capsule by mouth Daily., Disp: 90 capsule, Rfl: 1    cyanocobalamin 1000 MCG/ML injection, INJECT 1ML INTO THE APPROPRIATE MUSCLE AS DIRECTED BY PRESCRIBER EVERY 28 DAYS. (Patient taking differently: 1 mL Every 28 (Twenty-Eight) Days.), Disp: 3 mL, Rfl: 3    fluticasone (FLONASE) 50 MCG/ACT nasal spray, 1 spray into the nostril(s) as directed by provider Daily., Disp: 48 g, Rfl: 5    levothyroxine (SYNTHROID, LEVOTHROID) 100 MCG " tablet, Take 1 tablet by mouth Daily. CURRENT DOSE, Disp: 90 tablet, Rfl: 1    polyethylene glycol (MIRALAX) 17 g packet, Take 17 g by mouth Daily., Disp: 3 packet, Rfl: 0    Probiotic Product ([employer] PO), Take  by mouth. LAST DOSE 2/23/24, Disp: , Rfl:     Symbicort 160-4.5 MCG/ACT inhaler, Inhale 2 puffs 2 (Two) Times a Day., Disp: 10.2 g, Rfl: 3    traMADol (Ultram) 50 MG tablet, Take 1 tablet by mouth Every 6 (Six) Hours As Needed for Moderate Pain., Disp: 12 tablet, Rfl: 0    oxyCODONE-acetaminophen (Percocet) 5-325 MG per tablet, Take 1 tablet by mouth Every 6 (Six) Hours As Needed for Moderate Pain. (Patient not taking: Reported on 3/11/2024), Disp: 6 tablet, Rfl: 0   Family History   Problem Relation Age of Onset    Ovarian cancer Mother     Heart disease Mother     Thyroid disease Mother     Epilepsy Father     Breast cancer Sister     Breast cancer Paternal Aunt     Malig Hyperthermia Neg Hx           Vital Signs:   Vitals:    03/11/24 1047   BP: 150/80   BP Location: Right arm   Patient Position: Sitting   Cuff Size: Adult   Pulse: 77   Temp: 97.6 °F (36.4 °C)   SpO2: 100%   Weight: 59.1 kg (130 lb 6.4 oz)       Review of Systems   Constitutional:  Negative for appetite change, chills, diaphoresis, fatigue, fever, malaise/fatigue and weight loss.   HENT:  Negative for congestion, ear pain, hoarse voice, postnasal drip, rhinorrhea, sneezing, sore throat and trouble swallowing.    Eyes:  Negative for blurred vision.   Respiratory:  Negative for cough, hemoptysis, sputum production, shortness of breath and wheezing.    Cardiovascular:  Negative for chest pain, dyspnea on exertion, palpitations, orthopnea and PND.   Gastrointestinal:  Negative for abdominal pain, anorexia, change in bowel habit, heartburn, nausea and vomiting.   Musculoskeletal:  Negative for arthralgias, joint swelling, myalgias and neck pain.   Skin:  Negative for rash.   Neurological:  Negative for vertigo, weakness,  numbness and headaches.      Physical Exam  Vitals reviewed.   Constitutional:       Appearance: Normal appearance. She is well-developed.   HENT:      Head: Normocephalic and atraumatic.      Right Ear: External ear normal.      Left Ear: External ear normal.      Mouth/Throat:      Pharynx: No oropharyngeal exudate.   Eyes:      Conjunctiva/sclera: Conjunctivae normal.      Pupils: Pupils are equal, round, and reactive to light.   Neck:      Thyroid: No thyroid mass, thyromegaly or thyroid tenderness.   Cardiovascular:      Rate and Rhythm: Normal rate and regular rhythm.      Pulses: Normal pulses.      Heart sounds: Normal heart sounds. No murmur heard.     No friction rub. No gallop.   Pulmonary:      Effort: Pulmonary effort is normal.      Breath sounds: Normal breath sounds. No wheezing or rhonchi.   Abdominal:      General: Abdomen is flat. Bowel sounds are normal. There is no distension.      Palpations: Abdomen is soft. There is no mass.      Tenderness: There is no abdominal tenderness. There is no guarding or rebound.      Hernia: No hernia is present.   Musculoskeletal:         General: Normal range of motion.      Cervical back: Normal range of motion and neck supple.   Skin:     General: Skin is warm and dry.      Capillary Refill: Capillary refill takes less than 2 seconds.   Neurological:      General: No focal deficit present.      Mental Status: She is alert and oriented to person, place, and time.      Cranial Nerves: No cranial nerve deficit.   Psychiatric:         Mood and Affect: Mood and affect normal.         Behavior: Behavior normal.         Thought Content: Thought content normal.         Judgment: Judgment normal.        Result Review :   The following data was reviewed by: Jarrod Simms MD on 03/11/2024:  CMP          4/28/2023    13:17 9/12/2023    11:45   CMP   Glucose 95  83    BUN 19  13    Creatinine 0.81  0.88    EGFR 80.2  72.6    Sodium 141  143    Potassium 4.7  4.5     Chloride 107  106    Calcium 9.6  9.5    Total Protein 7.3  7.5    Albumin 3.8  4.7    Globulin 3.5  2.8    Total Bilirubin <0.2  0.3    Alkaline Phosphatase 94  92    AST (SGOT) 13  26    ALT (SGPT) 15  27    Albumin/Globulin Ratio 1.1  1.7    BUN/Creatinine Ratio 23.5  14.8    Anion Gap 7.8  11.3        Lipid Panel          9/12/2023    11:45   Lipid Panel   Total Cholesterol 235    Triglycerides 138    HDL Cholesterol 59    VLDL Cholesterol 25    LDL Cholesterol  151    LDL/HDL Ratio 2.52      TSH          5/19/2023    12:38 9/12/2023    11:45 11/13/2023    11:33   TSH   TSH 0.478  0.035  0.884                Assessment and Plan    Diagnoses and all orders for this visit:    1. Right leg pain (Primary)  -     traMADol (Ultram) 50 MG tablet; Take 1 tablet by mouth Every 6 (Six) Hours As Needed for Moderate Pain.  Dispense: 12 tablet; Refill: 0  -     CBC Auto Differential    2. Arthritis    3. Bilateral carpal tunnel syndrome    4. Moderate persistent asthma without complication  -     albuterol sulfate  (90 Base) MCG/ACT inhaler; Inhale 2 puffs Every 6 (Six) Hours As Needed for Wheezing.  Dispense: 18 g; Refill: 3  -     Symbicort 160-4.5 MCG/ACT inhaler; Inhale 2 puffs 2 (Two) Times a Day.  Dispense: 10.2 g; Refill: 3  -     CBC Auto Differential    5. Hypothyroidism, unspecified type  -     TSH+Free T4  -     levothyroxine (SYNTHROID, LEVOTHROID) 100 MCG tablet; Take 1 tablet by mouth Daily. CURRENT DOSE  Dispense: 90 tablet; Refill: 1    6. Mixed hyperlipidemia  -     Comprehensive Metabolic Panel  -     Lipid Panel            Follow Up   Return in about 27 weeks (around 9/16/2024) for Recheck, Medicare Wellness.  Patient was given instructions and counseling regarding her condition or for health maintenance advice. Please see specific information pulled into the AVS if appropriate.

## 2024-03-11 NOTE — PROGRESS NOTES
Venipuncture Blood Specimen Collection  Venipuncture performed in left arm  by Fior Goldberg with good hemostasis. Patient tolerated the procedure well without complications.   03/11/24   Fior Goldberg

## 2024-03-14 ENCOUNTER — OFFICE VISIT (OUTPATIENT)
Dept: SURGERY | Facility: CLINIC | Age: 69
End: 2024-03-14
Payer: MEDICARE

## 2024-03-14 ENCOUNTER — PREP FOR SURGERY (OUTPATIENT)
Dept: OTHER | Facility: HOSPITAL | Age: 69
End: 2024-03-14
Payer: MEDICARE

## 2024-03-14 VITALS
BODY MASS INDEX: 22.32 KG/M2 | HEIGHT: 63 IN | SYSTOLIC BLOOD PRESSURE: 183 MMHG | WEIGHT: 126 LBS | DIASTOLIC BLOOD PRESSURE: 91 MMHG

## 2024-03-14 DIAGNOSIS — R15.9 FULL INCONTINENCE OF FECES: Primary | ICD-10-CM

## 2024-03-14 RX ORDER — SODIUM CHLORIDE 0.9 % (FLUSH) 0.9 %
10 SYRINGE (ML) INJECTION EVERY 12 HOURS SCHEDULED
OUTPATIENT
Start: 2024-03-14

## 2024-03-14 RX ORDER — SODIUM CHLORIDE, SODIUM LACTATE, POTASSIUM CHLORIDE, CALCIUM CHLORIDE 600; 310; 30; 20 MG/100ML; MG/100ML; MG/100ML; MG/100ML
50 INJECTION, SOLUTION INTRAVENOUS CONTINUOUS
OUTPATIENT
Start: 2024-03-14

## 2024-03-14 RX ORDER — SODIUM CHLORIDE 0.9 % (FLUSH) 0.9 %
10 SYRINGE (ML) INJECTION AS NEEDED
OUTPATIENT
Start: 2024-03-14

## 2024-03-14 RX ORDER — SODIUM CHLORIDE 9 MG/ML
40 INJECTION, SOLUTION INTRAVENOUS AS NEEDED
OUTPATIENT
Start: 2024-03-14

## 2024-03-14 NOTE — LETTER
March 16, 2024       No Recipients    Patient: Nora Bone   YOB: 1955   Date of Visit: 3/14/2024     Dear Jarrod Simms MD:       Thank you for referring Nora Bone to me for evaluation. Below are the relevant portions of my assessment and plan of care.    If you have questions, please do not hesitate to call me. I look forward to following Nora along with you.         Sincerely,        Roque Dowd MD        CC:   No Recipients    Roque Dowd MD  03/16/24 1425  Sign when Signing Visit  General Surgery/Colorectal Surgery Note    Patient Name:  Nora Bone  YOB: 1955  [id number]    Referring Provider: No ref. provider found      Patient Care Team:  Jarrod Simms MD as PCP - General (Family Medicine)    Chief complaint follow-up, bowel accidents    Subjective.     History of present illness:    Previously seen with History of muscle weakness with elevated CK level    Status post right thigh muscle biopsy 2/26/2024.  Pathology with inflammatory myopathy, type II muscle fiber atrophy, background mild denervation type changes    She comes in for follow-up.  No fever, erythema.    She also comes in to discuss a new concern today with episodes of fecal incontinence.  Long history of constipation treated with MiraLAX.  Followed by Dr. Duff.  Father with colorectal cancer age 45.  Colonoscopy 2019 with diverticulosis and multiple polyps removed.  She has had recent new onset of mucus per rectum.  No blood per rectum.  She has 2 episodes of fecal incontinence per week over the past 4 to 5 months.  No history of the same.  No prolapse.  She has decreased sensation she needs to have a bowel movement.  Previous 6 vaginal deliveries with history of episiotomy.  No blood thinner use.      History:  Past Medical History:   Diagnosis Date   • Anemia     NO CURRENT ISSUES GETS MONTHLY SHOTS   • Anxiety    • Asthma    • Back pain     OCCASSIONAL   • Cervical  cancer    • COPD (chronic obstructive pulmonary disease)     no O2 use   • Deep vein thrombosis     NO CURRENT ISSUES   • Disease of thyroid gland    • Diverticulosis    • Hearing loss    • Hyperlipidemia    • Muscle weakness     detereration   • Peripheral vascular disease     WEARS SUPPORT HOSE/R VARICOSE VEINS       Past Surgical History:   Procedure Laterality Date   • CARPAL TUNNEL RELEASE Bilateral    • COLONOSCOPY W/ BIOPSIES     • DILATATION AND CURETTAGE     • MUSCLE BIOPSY Right 2024    Procedure: MUSCLE BIOPSY right thigh;  Surgeon: Roque Dowd MD;  Location: [facility];  Service: General;  Laterality: Right;   • VAGINAL HYSTERECTOMY     • VEIN SURGERY Right     STRIPPING       Family History   Problem Relation Age of Onset   • Ovarian cancer Mother    • Heart disease Mother    • Thyroid disease Mother    • Epilepsy Father    • Breast cancer Sister    • Breast cancer Paternal Aunt    • Malig Hyperthermia Neg Hx        Social History     Tobacco Use   • Smoking status: Former     Current packs/day: 0.00     Average packs/day: 1 pack/day for 20.0 years (20.0 ttl pk-yrs)     Types: Cigarettes     Start date: 1995     Quit date: 2015     Years since quittin.2     Passive exposure: Never   • Smokeless tobacco: Never   Vaping Use   • Vaping status: Former   Substance Use Topics   • Alcohol use: Never   • Drug use: Yes     Types: Marijuana     Comment: OCCASSIONAL PRN PAIN/ANXIETY       Review of Systems  All systems were reviewed and negative except for:   Review of Systems   Constitutional: Negative for chills, fever and unexpected weight loss.   HENT: Negative for congestion, nosebleeds and voice change.    Eyes: Negative for blurred vision, double vision and discharge.   Respiratory: Negative for apnea, chest tightness and shortness of breath.    Cardiovascular: Negative for chest pain and leg swelling.   Gastrointestinal:        See HPI   Endocrine: Negative for cold  "intolerance and heat intolerance.   Genitourinary: Negative for dysuria, hematuria and urgency.   Musculoskeletal: Negative for back pain, joint swelling and neck pain.   Skin: Negative for color change and dry skin.   Neurological: Negative for dizziness and confusion.   Hematological: Negative for adenopathy.   Psychiatric/Behavioral: Negative for agitation and behavioral problems.     MEDS:  Prior to Admission medications    Medication Sig Start Date End Date Taking? Authorizing Provider   albuterol sulfate  (90 Base) MCG/ACT inhaler Inhale 2 puffs Every 6 (Six) Hours As Needed for Wheezing. 3/11/24  Yes Jarrod Simms MD   amitriptyline (ELAVIL) 25 MG tablet Take 1 tablet by mouth Every Night. 9/12/23  Yes Jarrod Simms MD   B-D 3CC LUER-LUCINA SYR 25GX1\" 25G X 1\" 3 ML misc use once monthly for b12 injection 11/18/22  Yes Jarrod Simms MD   Calcium Carb-Cholecalciferol (Oyster Shell Calcium w/D) 500-5 MG-MCG tablet TAKE 1 TABLET BY MOUTH TWICE DAILY 10/9/23  Yes Jarrod Simms MD   celecoxib (CeleBREX) 200 MG capsule Take 1 capsule by mouth Daily. 9/12/23  Yes Jarrod Simms MD   cyanocobalamin 1000 MCG/ML injection INJECT 1ML INTO THE APPROPRIATE MUSCLE AS DIRECTED BY PRESCRIBER EVERY 28 DAYS.  Patient taking differently: 1 mL Every 28 (Twenty-Eight) Days. 7/6/23  Yes Jarrod Simms MD   fluticasone (FLONASE) 50 MCG/ACT nasal spray 1 spray into the nostril(s) as directed by provider Daily. 9/12/23  Yes Jarrod Simms MD   levothyroxine (SYNTHROID, LEVOTHROID) 88 MCG tablet Take 1 tablet by mouth Daily. CURRENT DOSE 3/11/24  Yes Jarrod Simms MD   polyethylene glycol (MIRALAX) 17 g packet Take 17 g by mouth Daily. 2/26/24  Yes Roque Dowd MD   Probiotic Product ([employer] PO) Take  by mouth. LAST DOSE 2/23/24   Yes Edna Damon MD   Symbicort 160-4.5 MCG/ACT inhaler Inhale 2 puffs 2 (Two) Times a Day. 3/11/24  Yes " "Jarrod Simms MD   traMADol (Ultram) 50 MG tablet Take 1 tablet by mouth Every 6 (Six) Hours As Needed for Moderate Pain. 3/11/24 3/11/25 Yes Jarrod Simms MD   oxyCODONE-acetaminophen (Percocet) 5-325 MG per tablet Take 1 tablet by mouth Every 6 (Six) Hours As Needed for Moderate Pain.  Patient not taking: Reported on 3/11/2024 2/26/24   Roque Dowd MD        Allergies:  Hydrocodone-acetaminophen, Pollen extract, Percocet [oxycodone-acetaminophen], and Molds & smuts    Objective    Vital Signs        Physical Exam:     General Appearance:    Alert, cooperative, in no acute distress   Head:    Normocephalic, without obvious abnormality, atraumatic   Eyes:          Conjunctivae and sclerae normal, no icterus,     Ears:    Ears appear intact with no abnormalities noted   Throat:   No oral lesions, no thrush, oral mucosa moist   Neck:   No adenopathy, supple, trachea midline, no thyromegaly   Back:     No kyphosis present, no scoliosis present, no skin lesions,      erythema or scars, no tenderness to percussion or                   palpation,   range of motion normal   Lungs:     Clear to auscultation,respirations regular, even and                  unlabored    Heart:    Regular rhythm and normal rate, normal S1 and S2, no            murmur, no gallop, no rub, no click   Chest Wall:    No abnormalities observed   Abdomen:     Normal bowel sounds, no masses, no organomegaly, soft        non-tender, non-distended, no guarding, no rebound                tenderness   Rectal:        Extremities:   Moves all extremities well, no edema, no cyanosis, no             redness   Pulses:   Pulses palpable and equal bilaterally   Skin:   No bleeding, bruising or rash   Lymph nodes:   No palpable adenopathy   Neurologic:   A/o x 4 with no deficits       Results Review:   {Results Review:29825::\"I reviewed the patient's new clinical results.\"    LABS/IMAGING:  Results for orders placed or performed in visit " on 03/11/24   CBC Auto Differential    Specimen: Blood   Result Value Ref Range    WBC 8.65 3.40 - 10.80 10*3/mm3    RBC 5.19 3.77 - 5.28 10*6/mm3    Hemoglobin 15.4 12.0 - 15.9 g/dL    Hematocrit 45.4 34.0 - 46.6 %    MCV 87.5 79.0 - 97.0 fL    MCH 29.7 26.6 - 33.0 pg    MCHC 33.9 31.5 - 35.7 g/dL    RDW 13.7 12.3 - 15.4 %    RDW-SD 43.4 37.0 - 54.0 fl    MPV 11.9 6.0 - 12.0 fL    Platelets 260 140 - 450 10*3/mm3    Neutrophil % 53.9 42.7 - 76.0 %    Lymphocyte % 34.0 19.6 - 45.3 %    Monocyte % 7.6 5.0 - 12.0 %    Eosinophil % 3.1 0.3 - 6.2 %    Basophil % 1.2 0.0 - 1.5 %    Immature Grans % 0.2 0.0 - 0.5 %    Neutrophils, Absolute 4.66 1.70 - 7.00 10*3/mm3    Lymphocytes, Absolute 2.94 0.70 - 3.10 10*3/mm3    Monocytes, Absolute 0.66 0.10 - 0.90 10*3/mm3    Eosinophils, Absolute 0.27 0.00 - 0.40 10*3/mm3    Basophils, Absolute 0.10 0.00 - 0.20 10*3/mm3    Immature Grans, Absolute 0.02 0.00 - 0.05 10*3/mm3    nRBC 0.0 0.0 - 0.2 /100 WBC   Comprehensive Metabolic Panel    Specimen: Blood   Result Value Ref Range    Glucose 88 65 - 99 mg/dL    BUN 14 8 - 23 mg/dL    Creatinine 0.93 0.57 - 1.00 mg/dL    Sodium 142 136 - 145 mmol/L    Potassium 4.9 3.5 - 5.2 mmol/L    Chloride 106 98 - 107 mmol/L    CO2 27.0 22.0 - 29.0 mmol/L    Calcium 9.5 8.6 - 10.5 mg/dL    Total Protein 7.3 6.0 - 8.5 g/dL    Albumin 4.6 3.5 - 5.2 g/dL    ALT (SGPT) 23 1 - 33 U/L    AST (SGOT) 27 1 - 32 U/L    Alkaline Phosphatase 103 39 - 117 U/L    Total Bilirubin 0.3 0.0 - 1.2 mg/dL    Globulin 2.7 gm/dL    A/G Ratio 1.7 g/dL    BUN/Creatinine Ratio 15.1 7.0 - 25.0    Anion Gap 9.0 5.0 - 15.0 mmol/L    eGFR 67.5 >60.0 mL/min/1.73   Lipid Panel    Specimen: Blood   Result Value Ref Range    Total Cholesterol 245 (H) 0 - 200 mg/dL    Triglycerides 174 (H) 0 - 150 mg/dL    HDL Cholesterol 54 40 - 60 mg/dL    LDL Cholesterol  159 (H) 0 - 100 mg/dL    VLDL Cholesterol 32 5 - 40 mg/dL    LDL/HDL Ratio 2.89    TSH+Free T4    Specimen: Blood   Result  Value Ref Range    TSH 0.250 (L) 0.270 - 4.200 uIU/mL    Free T4 1.81 (H) 0.93 - 1.70 ng/dL        Result Review:     Assessment & Plan    Status post right thigh muscle biopsy 2/26/2024.  Pathology with inflammatory myopathy, type II muscle fiber atrophy, background mild denervation type changes    Constipation, recent change in bowel habits with mucus per rectum, personal history of colorectal polyps, family history father with colorectal cancer  Fecal incontinence    Discussion with patient.  I reviewed the muscle biopsy results.  She has follow-up with her physician to discuss this.  Activity as tolerated.    For her new concerns today, patient likely needs repeat colonoscopy with biopsies and will send a copy of the note to Dr. Duff to consider with her having performed her colonoscopy in the past.    For her fecal incontinence, I recommended a 1 week peripheral nerve evaluation test for sacral neuromodulation.  Procedure and recovery discussed.  Benefits and alternatives discussed.  Risk procedure including risk of anesthesia, bleeding, infection, need for more testing, pain discussed.  All questions answered.  She agrees with the plan.  Orders placed.  She was instructed to use chlorhexidine the night before surgery.  Thank you for the consult.             This document has been electronically signed by Roque Dowd MD  March 16, 2024 14:15 EDT

## 2024-03-16 NOTE — PROGRESS NOTES
General Surgery/Colorectal Surgery Note    Patient Name:  Nora Bone  YOB: 1955  [id number]    Referring Provider: No ref. provider found      Patient Care Team:  Jarrod Simms MD as PCP - General (Family Medicine)    Chief complaint follow-up, bowel accidents    Subjective .     History of present illness:    Previously seen with History of muscle weakness with elevated CK level    Status post right thigh muscle biopsy 2/26/2024.  Pathology with inflammatory myopathy, type II muscle fiber atrophy, background mild denervation type changes    She comes in for follow-up.  No fever, erythema.    She also comes in to discuss a new concern today with episodes of fecal incontinence.  Long history of constipation treated with MiraLAX.  Followed by Dr. Duff.  Father with colorectal cancer age 45.  Colonoscopy 2019 with diverticulosis and multiple polyps removed.  She has had recent new onset of mucus per rectum.  No blood per rectum.  She has 2 episodes of fecal incontinence per week over the past 4 to 5 months.  No history of the same.  No prolapse.  She has decreased sensation she needs to have a bowel movement.  Previous 6 vaginal deliveries with history of episiotomy.  No blood thinner use.      History:  Past Medical History:   Diagnosis Date    Anemia     NO CURRENT ISSUES GETS MONTHLY SHOTS    Anxiety     Asthma     Back pain     OCCASSIONAL    Cervical cancer     COPD (chronic obstructive pulmonary disease)     no O2 use    Deep vein thrombosis     NO CURRENT ISSUES    Disease of thyroid gland     Diverticulosis     Hearing loss     Hyperlipidemia     Muscle weakness     detereration    Peripheral vascular disease     WEARS SUPPORT HOSE/R VARICOSE VEINS       Past Surgical History:   Procedure Laterality Date    CARPAL TUNNEL RELEASE Bilateral     COLONOSCOPY W/ BIOPSIES      DILATATION AND CURETTAGE      MUSCLE BIOPSY Right 02/26/2024    Procedure: MUSCLE BIOPSY right thigh;  Surgeon:  Roque Dowd MD;  Location: [facility] OR [facility];  Service: General;  Laterality: Right;    VAGINAL HYSTERECTOMY      VEIN SURGERY Right     STRIPPING       Family History   Problem Relation Age of Onset    Ovarian cancer Mother     Heart disease Mother     Thyroid disease Mother     Epilepsy Father     Breast cancer Sister     Breast cancer Paternal Aunt     Malig Hyperthermia Neg Hx        Social History     Tobacco Use    Smoking status: Former     Current packs/day: 0.00     Average packs/day: 1 pack/day for 20.0 years (20.0 ttl pk-yrs)     Types: Cigarettes     Start date: 1995     Quit date: 2015     Years since quittin.2     Passive exposure: Never    Smokeless tobacco: Never   Vaping Use    Vaping status: Former   Substance Use Topics    Alcohol use: Never    Drug use: Yes     Types: Marijuana     Comment: OCCASSIONAL PRN PAIN/ANXIETY       Review of Systems  All systems were reviewed and negative except for:   Review of Systems   Constitutional: Negative for chills, fever and unexpected weight loss.   HENT: Negative for congestion, nosebleeds and voice change.    Eyes: Negative for blurred vision, double vision and discharge.   Respiratory: Negative for apnea, chest tightness and shortness of breath.    Cardiovascular: Negative for chest pain and leg swelling.   Gastrointestinal:        See HPI   Endocrine: Negative for cold intolerance and heat intolerance.   Genitourinary: Negative for dysuria, hematuria and urgency.   Musculoskeletal: Negative for back pain, joint swelling and neck pain.   Skin: Negative for color change and dry skin.   Neurological: Negative for dizziness and confusion.   Hematological: Negative for adenopathy.   Psychiatric/Behavioral: Negative for agitation and behavioral problems.     MEDS:  Prior to Admission medications    Medication Sig Start Date End Date Taking? Authorizing Provider   albuterol sulfate  (90 Base) MCG/ACT inhaler Inhale 2 puffs Every 6 (Six)  "Hours As Needed for Wheezing. 3/11/24  Yes Jarrod Simms MD   amitriptyline (ELAVIL) 25 MG tablet Take 1 tablet by mouth Every Night. 9/12/23  Yes Jarrod Simms MD   B-D 3CC LUER-LUCINA SYR 25GX1\" 25G X 1\" 3 ML misc use once monthly for b12 injection 11/18/22  Yes Jarrod Simms MD   Calcium Carb-Cholecalciferol (Oyster Shell Calcium w/D) 500-5 MG-MCG tablet TAKE 1 TABLET BY MOUTH TWICE DAILY 10/9/23  Yes Jarrod Simms MD   celecoxib (CeleBREX) 200 MG capsule Take 1 capsule by mouth Daily. 9/12/23  Yes Jarrod Simms MD   cyanocobalamin 1000 MCG/ML injection INJECT 1ML INTO THE APPROPRIATE MUSCLE AS DIRECTED BY PRESCRIBER EVERY 28 DAYS.  Patient taking differently: 1 mL Every 28 (Twenty-Eight) Days. 7/6/23  Yes Jarrod Simms MD   fluticasone (FLONASE) 50 MCG/ACT nasal spray 1 spray into the nostril(s) as directed by provider Daily. 9/12/23  Yes Jarrod Simms MD   levothyroxine (SYNTHROID, LEVOTHROID) 88 MCG tablet Take 1 tablet by mouth Daily. CURRENT DOSE 3/11/24  Yes Jarrod Simms MD   polyethylene glycol (MIRALAX) 17 g packet Take 17 g by mouth Daily. 2/26/24  Yes Roque Dowd MD   Probiotic Product (Reflektion PO) Take  by mouth. LAST DOSE 2/23/24   Yes ProviderEdna MD   Symbicort 160-4.5 MCG/ACT inhaler Inhale 2 puffs 2 (Two) Times a Day. 3/11/24  Yes Jarrod Simms MD   traMADol (Ultram) 50 MG tablet Take 1 tablet by mouth Every 6 (Six) Hours As Needed for Moderate Pain. 3/11/24 3/11/25 Yes Jarrod Simms MD   oxyCODONE-acetaminophen (Percocet) 5-325 MG per tablet Take 1 tablet by mouth Every 6 (Six) Hours As Needed for Moderate Pain.  Patient not taking: Reported on 3/11/2024 2/26/24   Roque Dowd MD        Allergies:  Hydrocodone-acetaminophen, Pollen extract, Percocet [oxycodone-acetaminophen], and Molds & smuts    Objective     Vital Signs        Physical Exam:     General Appearance:    Alert, " "cooperative, in no acute distress   Head:    Normocephalic, without obvious abnormality, atraumatic   Eyes:          Conjunctivae and sclerae normal, no icterus,     Ears:    Ears appear intact with no abnormalities noted   Throat:   No oral lesions, no thrush, oral mucosa moist   Neck:   No adenopathy, supple, trachea midline, no thyromegaly   Back:     No kyphosis present, no scoliosis present, no skin lesions,      erythema or scars, no tenderness to percussion or                   palpation,   range of motion normal   Lungs:     Clear to auscultation,respirations regular, even and                  unlabored    Heart:    Regular rhythm and normal rate, normal S1 and S2, no            murmur, no gallop, no rub, no click   Chest Wall:    No abnormalities observed   Abdomen:     Normal bowel sounds, no masses, no organomegaly, soft        non-tender, non-distended, no guarding, no rebound                tenderness   Rectal:        Extremities:   Moves all extremities well, no edema, no cyanosis, no             redness   Pulses:   Pulses palpable and equal bilaterally   Skin:   No bleeding, bruising or rash   Lymph nodes:   No palpable adenopathy   Neurologic:   A/o x 4 with no deficits       Results Review:   {Results Review:63513::\"I reviewed the patient's new clinical results.\"    LABS/IMAGING:  Results for orders placed or performed in visit on 03/11/24   CBC Auto Differential    Specimen: Blood   Result Value Ref Range    WBC 8.65 3.40 - 10.80 10*3/mm3    RBC 5.19 3.77 - 5.28 10*6/mm3    Hemoglobin 15.4 12.0 - 15.9 g/dL    Hematocrit 45.4 34.0 - 46.6 %    MCV 87.5 79.0 - 97.0 fL    MCH 29.7 26.6 - 33.0 pg    MCHC 33.9 31.5 - 35.7 g/dL    RDW 13.7 12.3 - 15.4 %    RDW-SD 43.4 37.0 - 54.0 fl    MPV 11.9 6.0 - 12.0 fL    Platelets 260 140 - 450 10*3/mm3    Neutrophil % 53.9 42.7 - 76.0 %    Lymphocyte % 34.0 19.6 - 45.3 %    Monocyte % 7.6 5.0 - 12.0 %    Eosinophil % 3.1 0.3 - 6.2 %    Basophil % 1.2 0.0 - 1.5 % "    Immature Grans % 0.2 0.0 - 0.5 %    Neutrophils, Absolute 4.66 1.70 - 7.00 10*3/mm3    Lymphocytes, Absolute 2.94 0.70 - 3.10 10*3/mm3    Monocytes, Absolute 0.66 0.10 - 0.90 10*3/mm3    Eosinophils, Absolute 0.27 0.00 - 0.40 10*3/mm3    Basophils, Absolute 0.10 0.00 - 0.20 10*3/mm3    Immature Grans, Absolute 0.02 0.00 - 0.05 10*3/mm3    nRBC 0.0 0.0 - 0.2 /100 WBC   Comprehensive Metabolic Panel    Specimen: Blood   Result Value Ref Range    Glucose 88 65 - 99 mg/dL    BUN 14 8 - 23 mg/dL    Creatinine 0.93 0.57 - 1.00 mg/dL    Sodium 142 136 - 145 mmol/L    Potassium 4.9 3.5 - 5.2 mmol/L    Chloride 106 98 - 107 mmol/L    CO2 27.0 22.0 - 29.0 mmol/L    Calcium 9.5 8.6 - 10.5 mg/dL    Total Protein 7.3 6.0 - 8.5 g/dL    Albumin 4.6 3.5 - 5.2 g/dL    ALT (SGPT) 23 1 - 33 U/L    AST (SGOT) 27 1 - 32 U/L    Alkaline Phosphatase 103 39 - 117 U/L    Total Bilirubin 0.3 0.0 - 1.2 mg/dL    Globulin 2.7 gm/dL    A/G Ratio 1.7 g/dL    BUN/Creatinine Ratio 15.1 7.0 - 25.0    Anion Gap 9.0 5.0 - 15.0 mmol/L    eGFR 67.5 >60.0 mL/min/1.73   Lipid Panel    Specimen: Blood   Result Value Ref Range    Total Cholesterol 245 (H) 0 - 200 mg/dL    Triglycerides 174 (H) 0 - 150 mg/dL    HDL Cholesterol 54 40 - 60 mg/dL    LDL Cholesterol  159 (H) 0 - 100 mg/dL    VLDL Cholesterol 32 5 - 40 mg/dL    LDL/HDL Ratio 2.89    TSH+Free T4    Specimen: Blood   Result Value Ref Range    TSH 0.250 (L) 0.270 - 4.200 uIU/mL    Free T4 1.81 (H) 0.93 - 1.70 ng/dL        Result Review :     Assessment & Plan     Status post right thigh muscle biopsy 2/26/2024.  Pathology with inflammatory myopathy, type II muscle fiber atrophy, background mild denervation type changes    Constipation, recent change in bowel habits with mucus per rectum, personal history of colorectal polyps, family history father with colorectal cancer  Fecal incontinence    Discussion with patient.  I reviewed the muscle biopsy results.  She has follow-up with her physician to  discuss this.  Activity as tolerated.    For her new concerns today, patient likely needs repeat colonoscopy with biopsies and will send a copy of the note to Dr. Duff to consider with her having performed her colonoscopy in the past.    For her fecal incontinence, I recommended a 1 week peripheral nerve evaluation test for sacral neuromodulation.  Procedure and recovery discussed.  Benefits and alternatives discussed.  Risk procedure including risk of anesthesia, bleeding, infection, need for more testing, pain discussed.  All questions answered.  She agrees with the plan.  Orders placed.  She was instructed to use chlorhexidine the night before surgery.  Thank you for the consult.             This document has been electronically signed by Roque Dowd MD  March 16, 2024 14:15 EDT

## 2024-03-18 ENCOUNTER — TELEPHONE (OUTPATIENT)
Dept: GASTROENTEROLOGY | Facility: CLINIC | Age: 69
End: 2024-03-18
Payer: MEDICARE

## 2024-03-18 NOTE — TELEPHONE ENCOUNTER
Dr. Dowd's office note reviewed.  Patient with recent change in bowel habits, constipation.  Please arrange f/u appointment in office with NP to discuss further.  Pt will likely need to schedule colonoscopy at f/u.  thanks

## 2024-03-19 PROBLEM — R15.9 FULL INCONTINENCE OF FECES: Status: ACTIVE | Noted: 2024-03-14

## 2024-04-03 DIAGNOSIS — E53.8 VITAMIN B12 DEFICIENCY: ICD-10-CM

## 2024-04-03 DIAGNOSIS — G56.03 BILATERAL CARPAL TUNNEL SYNDROME: ICD-10-CM

## 2024-04-03 DIAGNOSIS — M19.90 ARTHRITIS: ICD-10-CM

## 2024-04-03 RX ORDER — CELECOXIB 200 MG/1
200 CAPSULE ORAL DAILY
Qty: 90 CAPSULE | Refills: 1 | OUTPATIENT
Start: 2024-04-03

## 2024-04-03 RX ORDER — CYANOCOBALAMIN 1000 UG/ML
INJECTION, SOLUTION INTRAMUSCULAR; SUBCUTANEOUS
Qty: 3 ML | Refills: 1 | OUTPATIENT
Start: 2024-04-03

## 2024-04-12 ENCOUNTER — TELEPHONE (OUTPATIENT)
Dept: SURGERY | Facility: CLINIC | Age: 69
End: 2024-04-12
Payer: MEDICARE

## 2024-04-12 ENCOUNTER — TELEPHONE (OUTPATIENT)
Dept: GASTROENTEROLOGY | Facility: CLINIC | Age: 69
End: 2024-04-12
Payer: MEDICARE

## 2024-04-12 NOTE — TELEPHONE ENCOUNTER
Patient called and left a message about the appointment with Fabiola Nguyen on 07/23/2024.    Upon review, this is a follow up appointment due to changes in bowels so that would require, an office appointment. So the appointment on 07/23/2024 will be an in office appointment only with Fabiola LEBLANC to go over the changes.

## 2024-04-12 NOTE — TELEPHONE ENCOUNTER
PT IS SCHEDULED FOR PNE ON 4/19. SHE CALLED TO SPEAK WITH DR STANFORD REGARDING A NEW DIAGNOSIS THAT SHE GOT FROM DR WHITTINGTON. SHE WOULD NOT ELABORATE WITH ME WHAT SHE WANTS TO DISCUSS. PT WAS ADAMANT THAT SHE SPEAK WITH DR STANFORD BEFORE SURGERY.

## 2024-04-15 NOTE — TELEPHONE ENCOUNTER
Caller: Nora Bone    Relationship: Self    Best call back number: [phone]     What is the best time to reach you: ANYTIME    Who are you requesting to speak with (clinical staff, provider,  specific staff member): DR. STANFORD OR MA    Do you know the name of the person who called: VAUGHN ORELLANA    What was the call regarding: PT IS RETURNING THE CALL - STATES SHE IS REPORTING A NEW DX AND MEDICATION PRIOR TO SURGERY AND WANTS TO KNOW IF OKAY TO CONTINUE - NEW DX IS: POLYNYOSITIS; PT IS NOW TAKING PREDNISONE 10 MG 4 X DAILY     Is it okay if the provider responds through [facility]: NO - PLEASE CALL - OKAY TO LEAVE DETAILED VM

## 2024-04-17 RX ORDER — PREDNISONE 10 MG/1
40 TABLET ORAL DAILY
COMMUNITY
Start: 2024-04-04

## 2024-04-17 NOTE — PRE-PROCEDURE INSTRUCTIONS
PRE-OP INSTRUCTIONS REVIEWED WITH PATIENT: FASTING/BATHING/ARRIVAL PROCEDURES.  INSTRUCTED TO TAKE A.M. DAY OF SURGERY: LEVOTHYROXINE SYMBICORT INH, ALBUTEROL INHALER AS NEEDED, FLONASE NASAL SPRAY.  UNDERSTANDING VERBALIZED.

## 2024-04-19 ENCOUNTER — ANESTHESIA (OUTPATIENT)
Dept: PERIOP | Facility: HOSPITAL | Age: 69
End: 2024-04-19
Payer: MEDICARE

## 2024-04-19 ENCOUNTER — ANESTHESIA EVENT (OUTPATIENT)
Dept: PERIOP | Facility: HOSPITAL | Age: 69
End: 2024-04-19
Payer: MEDICARE

## 2024-04-19 ENCOUNTER — HOSPITAL ENCOUNTER (OUTPATIENT)
Facility: HOSPITAL | Age: 69
Setting detail: HOSPITAL OUTPATIENT SURGERY
Discharge: HOME | End: 2024-04-19
Attending: SURGERY | Admitting: SURGERY
Payer: MEDICARE

## 2024-04-19 VITALS
SYSTOLIC BLOOD PRESSURE: 158 MMHG | TEMPERATURE: 98.2 F | WEIGHT: 132.5 LBS | BODY MASS INDEX: 22.62 KG/M2 | HEART RATE: 74 BPM | HEIGHT: 64 IN | DIASTOLIC BLOOD PRESSURE: 80 MMHG | OXYGEN SATURATION: 100 % | RESPIRATION RATE: 18 BRPM

## 2024-04-19 DIAGNOSIS — R15.9 FULL INCONTINENCE OF FECES: ICD-10-CM

## 2024-04-19 PROCEDURE — 64561 IMPLANT NEUROELECTRODES: CPT | Performed by: SURGERY

## 2024-04-19 PROCEDURE — 25810000003 LACTATED RINGERS PER 1000 ML: Performed by: ANESTHESIOLOGY

## 2024-04-19 PROCEDURE — C1778 LEAD, NEUROSTIMULATOR: HCPCS | Performed by: SURGERY

## 2024-04-19 PROCEDURE — 25010000002 MIDAZOLAM PER 1MG: Performed by: ANESTHESIOLOGY

## 2024-04-19 PROCEDURE — C1894 INTRO/SHEATH, NON-LASER: HCPCS | Performed by: SURGERY

## 2024-04-19 PROCEDURE — 25010000002 GLYCOPYRROLATE 0.2 MG/ML SOLUTION

## 2024-04-19 PROCEDURE — 64561 IMPLANT NEUROELECTRODES: CPT

## 2024-04-19 PROCEDURE — 25010000002 PROPOFOL 10 MG/ML EMULSION

## 2024-04-19 PROCEDURE — 25010000002 LIDOCAINE 1 % SOLUTION: Performed by: SURGERY

## 2024-04-19 PROCEDURE — C1897 LEAD, NEUROSTIM TEST KIT: HCPCS | Performed by: SURGERY

## 2024-04-19 DEVICE — PNE LEAD
Type: IMPLANTABLE DEVICE | Site: BACK | Status: FUNCTIONAL
Brand: AXONICS

## 2024-04-19 DEVICE — PNE LEAD IMPLANT KIT
Type: IMPLANTABLE DEVICE | Status: FUNCTIONAL
Brand: AXONICS

## 2024-04-19 RX ORDER — LIDOCAINE HYDROCHLORIDE 10 MG/ML
INJECTION, SOLUTION INFILTRATION; PERINEURAL AS NEEDED
Status: DISCONTINUED | OUTPATIENT
Start: 2024-04-19 | End: 2024-04-19 | Stop reason: HOSPADM

## 2024-04-19 RX ORDER — SODIUM CHLORIDE 9 MG/ML
40 INJECTION, SOLUTION INTRAVENOUS AS NEEDED
Status: DISCONTINUED | OUTPATIENT
Start: 2024-04-19 | End: 2024-04-19 | Stop reason: HOSPADM

## 2024-04-19 RX ORDER — MIDAZOLAM HYDROCHLORIDE 2 MG/2ML
2 INJECTION, SOLUTION INTRAMUSCULAR; INTRAVENOUS ONCE
Status: COMPLETED | OUTPATIENT
Start: 2024-04-19 | End: 2024-04-19

## 2024-04-19 RX ORDER — TRAMADOL HYDROCHLORIDE 50 MG/1
50 TABLET ORAL EVERY 6 HOURS PRN
Qty: 8 TABLET | Refills: 0 | Status: SHIPPED | OUTPATIENT
Start: 2024-04-19 | End: 2025-04-19

## 2024-04-19 RX ORDER — GLYCOPYRROLATE 0.2 MG/ML
INJECTION INTRAMUSCULAR; INTRAVENOUS AS NEEDED
Status: DISCONTINUED | OUTPATIENT
Start: 2024-04-19 | End: 2024-04-19 | Stop reason: SURG

## 2024-04-19 RX ORDER — SODIUM CHLORIDE 0.9 % (FLUSH) 0.9 %
10 SYRINGE (ML) INJECTION EVERY 12 HOURS SCHEDULED
Status: DISCONTINUED | OUTPATIENT
Start: 2024-04-19 | End: 2024-04-19 | Stop reason: HOSPADM

## 2024-04-19 RX ORDER — MEPERIDINE HYDROCHLORIDE 25 MG/ML
12.5 INJECTION INTRAMUSCULAR; INTRAVENOUS; SUBCUTANEOUS
Status: DISCONTINUED | OUTPATIENT
Start: 2024-04-19 | End: 2024-04-19 | Stop reason: HOSPADM

## 2024-04-19 RX ORDER — LIDOCAINE HYDROCHLORIDE 20 MG/ML
INJECTION, SOLUTION EPIDURAL; INFILTRATION; INTRACAUDAL; PERINEURAL AS NEEDED
Status: DISCONTINUED | OUTPATIENT
Start: 2024-04-19 | End: 2024-04-19 | Stop reason: SURG

## 2024-04-19 RX ORDER — KETAMINE HCL IN NACL, ISO-OSM 100MG/10ML
SYRINGE (ML) INJECTION AS NEEDED
Status: DISCONTINUED | OUTPATIENT
Start: 2024-04-19 | End: 2024-04-19 | Stop reason: SURG

## 2024-04-19 RX ORDER — SODIUM CHLORIDE, SODIUM LACTATE, POTASSIUM CHLORIDE, CALCIUM CHLORIDE 600; 310; 30; 20 MG/100ML; MG/100ML; MG/100ML; MG/100ML
50 INJECTION, SOLUTION INTRAVENOUS CONTINUOUS
Status: DISCONTINUED | OUTPATIENT
Start: 2024-04-19 | End: 2024-04-19 | Stop reason: HOSPADM

## 2024-04-19 RX ORDER — PROMETHAZINE HYDROCHLORIDE 12.5 MG/1
25 TABLET ORAL ONCE AS NEEDED
Status: DISCONTINUED | OUTPATIENT
Start: 2024-04-19 | End: 2024-04-19 | Stop reason: HOSPADM

## 2024-04-19 RX ORDER — SCOLOPAMINE TRANSDERMAL SYSTEM 1 MG/1
1 PATCH, EXTENDED RELEASE TRANSDERMAL ONCE
Status: DISCONTINUED | OUTPATIENT
Start: 2024-04-19 | End: 2024-04-19 | Stop reason: HOSPADM

## 2024-04-19 RX ORDER — SODIUM CHLORIDE 0.9 % (FLUSH) 0.9 %
10 SYRINGE (ML) INJECTION AS NEEDED
Status: DISCONTINUED | OUTPATIENT
Start: 2024-04-19 | End: 2024-04-19 | Stop reason: HOSPADM

## 2024-04-19 RX ORDER — PROPOFOL 10 MG/ML
VIAL (ML) INTRAVENOUS AS NEEDED
Status: DISCONTINUED | OUTPATIENT
Start: 2024-04-19 | End: 2024-04-19 | Stop reason: SURG

## 2024-04-19 RX ORDER — ONDANSETRON 2 MG/ML
4 INJECTION INTRAMUSCULAR; INTRAVENOUS ONCE AS NEEDED
Status: DISCONTINUED | OUTPATIENT
Start: 2024-04-19 | End: 2024-04-19 | Stop reason: HOSPADM

## 2024-04-19 RX ORDER — PROMETHAZINE HYDROCHLORIDE 25 MG/1
25 SUPPOSITORY RECTAL ONCE AS NEEDED
Status: DISCONTINUED | OUTPATIENT
Start: 2024-04-19 | End: 2024-04-19 | Stop reason: HOSPADM

## 2024-04-19 RX ORDER — SODIUM CHLORIDE, SODIUM LACTATE, POTASSIUM CHLORIDE, CALCIUM CHLORIDE 600; 310; 30; 20 MG/100ML; MG/100ML; MG/100ML; MG/100ML
9 INJECTION, SOLUTION INTRAVENOUS CONTINUOUS PRN
Status: DISCONTINUED | OUTPATIENT
Start: 2024-04-19 | End: 2024-04-19 | Stop reason: HOSPADM

## 2024-04-19 RX ADMIN — LIDOCAINE HYDROCHLORIDE 80 MG: 20 INJECTION, SOLUTION EPIDURAL; INFILTRATION; INTRACAUDAL; PERINEURAL at 07:30

## 2024-04-19 RX ADMIN — Medication 20 MG: at 07:27

## 2024-04-19 RX ADMIN — PROPOFOL 100 MCG/KG/MIN: 10 INJECTION, EMULSION INTRAVENOUS at 07:30

## 2024-04-19 RX ADMIN — MIDAZOLAM HYDROCHLORIDE 2 MG: 1 INJECTION, SOLUTION INTRAMUSCULAR; INTRAVENOUS at 07:17

## 2024-04-19 RX ADMIN — PROPOFOL 30 MG: 10 INJECTION, EMULSION INTRAVENOUS at 07:30

## 2024-04-19 RX ADMIN — SCOPALAMINE 1 PATCH: 1 PATCH, EXTENDED RELEASE TRANSDERMAL at 06:53

## 2024-04-19 RX ADMIN — SODIUM CHLORIDE, POTASSIUM CHLORIDE, SODIUM LACTATE AND CALCIUM CHLORIDE 9 ML/HR: 600; 310; 30; 20 INJECTION, SOLUTION INTRAVENOUS at 06:53

## 2024-04-19 RX ADMIN — GLYCOPYRROLATE 0.1 MG: 0.2 INJECTION INTRAMUSCULAR; INTRAVENOUS at 07:27

## 2024-04-19 NOTE — H&P
No changes    History of present illness:    Previously seen with History of muscle weakness with elevated CK level     Status post right thigh muscle biopsy 2/26/2024.  Pathology with inflammatory myopathy, type II muscle fiber atrophy, background mild denervation type changes     She comes in for follow-up.  No fever, erythema.     She also comes in to discuss a new concern today with episodes of fecal incontinence.  Long history of constipation treated with MiraLAX.  Followed by Dr. Duff.  Father with colorectal cancer age 45.  Colonoscopy 2019 with diverticulosis and multiple polyps removed.  She has had recent new onset of mucus per rectum.  No blood per rectum.  She has 2 episodes of fecal incontinence per week over the past 4 to 5 months.  No history of the same.  No prolapse.  She has decreased sensation she needs to have a bowel movement.  Previous 6 vaginal deliveries with history of episiotomy.  No blood thinner use.        History:  Medical History        Past Medical History:   Diagnosis Date    Anemia       NO CURRENT ISSUES GETS MONTHLY SHOTS    Anxiety      Asthma      Back pain       OCCASSIONAL    Cervical cancer      COPD (chronic obstructive pulmonary disease)       no O2 use    Deep vein thrombosis       NO CURRENT ISSUES    Disease of thyroid gland      Diverticulosis      Hearing loss      Hyperlipidemia      Muscle weakness       detereration    Peripheral vascular disease       WEARS SUPPORT HOSE/R VARICOSE VEINS            Surgical History         Past Surgical History:   Procedure Laterality Date    CARPAL TUNNEL RELEASE Bilateral      COLONOSCOPY W/ BIOPSIES        DILATATION AND CURETTAGE        MUSCLE BIOPSY Right 02/26/2024     Procedure: MUSCLE BIOPSY right thigh;  Surgeon: Roque Dowd MD;  [facility];  Service: General;  Laterality: Right;    VAGINAL HYSTERECTOMY        VEIN SURGERY Right       STRIPPING                  Family History   Problem Relation  Age of Onset    Ovarian cancer Mother      Heart disease Mother      Thyroid disease Mother      Epilepsy Father      Breast cancer Sister      Breast cancer Paternal Aunt      Malig Hyperthermia Neg Hx           Social History   Social History            Tobacco Use    Smoking status: Former       Current packs/day: 0.00       Average packs/day: 1 pack/day for 20.0 years (20.0 ttl pk-yrs)       Types: Cigarettes       Start date: 1995       Quit date: 2015       Years since quittin.2       Passive exposure: Never    Smokeless tobacco: Never   Vaping Use    Vaping status: Former   Substance Use Topics    Alcohol use: Never    Drug use: Yes       Types: Marijuana       Comment: OCCASSIONAL PRN PAIN/ANXIETY            Review of Systems  All systems were reviewed and negative except for:   Review of Systems   Constitutional: Negative for chills, fever and unexpected weight loss.   HENT: Negative for congestion, nosebleeds and voice change.    Eyes: Negative for blurred vision, double vision and discharge.   Respiratory: Negative for apnea, chest tightness and shortness of breath.    Cardiovascular: Negative for chest pain and leg swelling.   Gastrointestinal:        See HPI   Endocrine: Negative for cold intolerance and heat intolerance.   Genitourinary: Negative for dysuria, hematuria and urgency.   Musculoskeletal: Negative for back pain, joint swelling and neck pain.   Skin: Negative for color change and dry skin.   Neurological: Negative for dizziness and confusion.   Hematological: Negative for adenopathy.   Psychiatric/Behavioral: Negative for agitation and behavioral problems.      MEDS:          Prior to Admission medications    Medication Sig Start Date End Date Taking? Authorizing Provider   albuterol sulfate  (90 Base) MCG/ACT inhaler Inhale 2 puffs Every 6 (Six) Hours As Needed for Wheezing. 3/11/24   Yes Jarrod Simms MD   amitriptyline (ELAVIL) 25 MG tablet Take 1 tablet by  "mouth Every Night. 9/12/23   Yes Jarrod Simms MD   B-D 3CC LUER-LUCINA SYR 25GX1\" 25G X 1\" 3 ML misc use once monthly for b12 injection 11/18/22   Yes Jarrod Simms MD   Calcium Carb-Cholecalciferol (Oyster Shell Calcium w/D) 500-5 MG-MCG tablet TAKE 1 TABLET BY MOUTH TWICE DAILY 10/9/23   Yes Jarrod Simms MD   celecoxib (CeleBREX) 200 MG capsule Take 1 capsule by mouth Daily. 9/12/23   Yes Jarrod Simms MD   cyanocobalamin 1000 MCG/ML injection INJECT 1ML INTO THE APPROPRIATE MUSCLE AS DIRECTED BY PRESCRIBER EVERY 28 DAYS.  Patient taking differently: 1 mL Every 28 (Twenty-Eight) Days. 7/6/23   Yes Jarrod Simms MD   fluticasone (FLONASE) 50 MCG/ACT nasal spray 1 spray into the nostril(s) as directed by provider Daily. 9/12/23   Yes Jarrod Simms MD   levothyroxine (SYNTHROID, LEVOTHROID) 88 MCG tablet Take 1 tablet by mouth Daily. CURRENT DOSE 3/11/24   Yes Jarrod Simms MD   polyethylene glycol (MIRALAX) 17 g packet Take 17 g by mouth Daily. 2/26/24   Yes Roque Dowd MD   Probiotic Product (Zaplox PO) Take  by mouth. LAST DOSE 2/23/24     Yes ProviderEdna MD   Symbicort 160-4.5 MCG/ACT inhaler Inhale 2 puffs 2 (Two) Times a Day. 3/11/24   Yes Jarrod Simms MD   traMADol (Ultram) 50 MG tablet Take 1 tablet by mouth Every 6 (Six) Hours As Needed for Moderate Pain. 3/11/24 3/11/25 Yes Jarrod Simms MD   oxyCODONE-acetaminophen (Percocet) 5-325 MG per tablet Take 1 tablet by mouth Every 6 (Six) Hours As Needed for Moderate Pain.  Patient not taking: Reported on 3/11/2024 2/26/24     Roque Dowd MD         Allergies:  Hydrocodone-acetaminophen, Pollen extract, Percocet [oxycodone-acetaminophen], and Molds & smuts           Objective  Vital Signs      Physical Exam:                General Appearance:    Alert, cooperative, in no acute distress   Head:    Normocephalic, without obvious abnormality, " "atraumatic   Eyes:          Conjunctivae and sclerae normal, no icterus,      Ears:    Ears appear intact with no abnormalities noted   Throat:   No oral lesions, no thrush, oral mucosa moist   Neck:   No adenopathy, supple, trachea midline, no thyromegaly   Back:     No kyphosis present, no scoliosis present, no skin lesions,      erythema or scars, no tenderness to percussion or                   palpation,   range of motion normal   Lungs:     Clear to auscultation,respirations regular, even and                  unlabored    Heart:    Regular rhythm and normal rate, normal S1 and S2, no            murmur, no gallop, no rub, no click   Chest Wall:    No abnormalities observed   Abdomen:     Normal bowel sounds, no masses, no organomegaly, soft        non-tender, non-distended, no guarding, no rebound                tenderness   Rectal:        Extremities:   Moves all extremities well, no edema, no cyanosis, no             redness   Pulses:   Pulses palpable and equal bilaterally   Skin:   No bleeding, bruising or rash   Lymph nodes:   No palpable adenopathy   Neurologic:   A/o x 4 with no deficits         Results Review:              {Results Review:12302::\"I reviewed the patient's new clinical results.\"     LABS/IMAGING:        Results for orders placed or performed in visit on 03/11/24   CBC Auto Differential     Specimen: Blood   Result Value Ref Range     WBC 8.65 3.40 - 10.80 10*3/mm3     RBC 5.19 3.77 - 5.28 10*6/mm3     Hemoglobin 15.4 12.0 - 15.9 g/dL     Hematocrit 45.4 34.0 - 46.6 %     MCV 87.5 79.0 - 97.0 fL     MCH 29.7 26.6 - 33.0 pg     MCHC 33.9 31.5 - 35.7 g/dL     RDW 13.7 12.3 - 15.4 %     RDW-SD 43.4 37.0 - 54.0 fl     MPV 11.9 6.0 - 12.0 fL     Platelets 260 140 - 450 10*3/mm3     Neutrophil % 53.9 42.7 - 76.0 %     Lymphocyte % 34.0 19.6 - 45.3 %     Monocyte % 7.6 5.0 - 12.0 %     Eosinophil % 3.1 0.3 - 6.2 %     Basophil % 1.2 0.0 - 1.5 %     Immature Grans % 0.2 0.0 - 0.5 %     " Neutrophils, Absolute 4.66 1.70 - 7.00 10*3/mm3     Lymphocytes, Absolute 2.94 0.70 - 3.10 10*3/mm3     Monocytes, Absolute 0.66 0.10 - 0.90 10*3/mm3     Eosinophils, Absolute 0.27 0.00 - 0.40 10*3/mm3     Basophils, Absolute 0.10 0.00 - 0.20 10*3/mm3     Immature Grans, Absolute 0.02 0.00 - 0.05 10*3/mm3     nRBC 0.0 0.0 - 0.2 /100 WBC   Comprehensive Metabolic Panel     Specimen: Blood   Result Value Ref Range     Glucose 88 65 - 99 mg/dL     BUN 14 8 - 23 mg/dL     Creatinine 0.93 0.57 - 1.00 mg/dL     Sodium 142 136 - 145 mmol/L     Potassium 4.9 3.5 - 5.2 mmol/L     Chloride 106 98 - 107 mmol/L     CO2 27.0 22.0 - 29.0 mmol/L     Calcium 9.5 8.6 - 10.5 mg/dL     Total Protein 7.3 6.0 - 8.5 g/dL     Albumin 4.6 3.5 - 5.2 g/dL     ALT (SGPT) 23 1 - 33 U/L     AST (SGOT) 27 1 - 32 U/L     Alkaline Phosphatase 103 39 - 117 U/L     Total Bilirubin 0.3 0.0 - 1.2 mg/dL     Globulin 2.7 gm/dL     A/G Ratio 1.7 g/dL     BUN/Creatinine Ratio 15.1 7.0 - 25.0     Anion Gap 9.0 5.0 - 15.0 mmol/L     eGFR 67.5 >60.0 mL/min/1.73   Lipid Panel     Specimen: Blood   Result Value Ref Range     Total Cholesterol 245 (H) 0 - 200 mg/dL     Triglycerides 174 (H) 0 - 150 mg/dL     HDL Cholesterol 54 40 - 60 mg/dL     LDL Cholesterol  159 (H) 0 - 100 mg/dL     VLDL Cholesterol 32 5 - 40 mg/dL     LDL/HDL Ratio 2.89     TSH+Free T4     Specimen: Blood   Result Value Ref Range     TSH 0.250 (L) 0.270 - 4.200 uIU/mL     Free T4 1.81 (H) 0.93 - 1.70 ng/dL               Result Review  :            Assessment & Plan  Status post right thigh muscle biopsy 2/26/2024.  Pathology with inflammatory myopathy, type II muscle fiber atrophy, background mild denervation type changes     Constipation, recent change in bowel habits with mucus per rectum, personal history of colorectal polyps, family history father with colorectal cancer  Fecal incontinence     Discussion with patient.  I reviewed the muscle biopsy results.  She has follow-up with her  physician to discuss this.  Activity as tolerated.     For her new concerns today, patient likely needs repeat colonoscopy with biopsies and will send a copy of the note to Dr. Duff to consider with her having performed her colonoscopy in the past.     For her fecal incontinence, I recommended a 1 week peripheral nerve evaluation test for sacral neuromodulation.  Procedure and recovery discussed.  Benefits and alternatives discussed.  Risk procedure including risk of anesthesia, bleeding, infection, need for more testing, pain discussed.  All questions answered.  She agrees with the plan.  Orders placed.  She was instructed to use chlorhexidine the night before surgery.  Thank you for the consult.

## 2024-04-19 NOTE — DISCHARGE INSTRUCTIONS
DISCHARGE INSTRUCTIONS  SURGICAL / AMBULATORY  PROCEDURES      For your surgery you had:  General anesthesia (you may have a sore throat for the first 24 hours)  You received a medicated patch for nausea prevention today (behind your ear). It is recommended that you remove it 24-48 hours post-operatively. It must be removed within 72 hours.   You may experience dizziness, drowsiness, or light-headedness for several hours following surgery/procedure.  Do not stay alone today or tonight.  Limit your activity for 24 hours.  Resume your diet slowly.  Follow whatever special dietary instructions you may have been given by your doctor.  You should not drive or operate machinery, drink alcohol, or sign legally binding documents for 24 hours or while you are taking pain medication.  Last dose of pain medication was given at:   .  NOTIFY YOUR DOCTOR IF YOU EXPERIENCE ANY OF THE FOLLOWING:  Temperature greater than 101 degrees Fahrenheit  Shaking Chills  Redness or excessive drainage from incision  Nausea, vomiting and/or pain that is not controlled by prescribed medications  Increase in bleeding or bleeding that is excessive  Unable to urinate in 6 hours after surgery  If unable to reach your doctor, please go to the closest Emergency Room    You may sponge bathe only. Leave dressing on until your follow up appointment. If bandage comes off, call Dr. Dowd's office.    .    Medications per physician instructions as indicated on Discharge Medication Information Sheet.  You should see   for follow-up care   on   .  Phone number:       SPECIAL INSTRUCTIONS:     Call for fever, erythema, purulent drainage.  No shower for 1 week.  Okay to sponge bath.  Call my office if the dressing comes off.  Call helpline for questions regarding the settings.  Tylenol for pain.  No significant exercising this week with the leads in place.

## 2024-04-19 NOTE — ANESTHESIA POSTPROCEDURE EVALUATION
Patient: Nora Bone    Procedure Summary       Date: 04/19/24 Room / Location: [facility]  / [facility]    Anesthesia Start: 0724 Anesthesia Stop: 0808    Procedure: basic peripheral nerve evaluation Diagnosis:       Full incontinence of feces      (Full incontinence of feces [R15.9])    Surgeons: Roque Dowd MD Provider: Tomas Prescott MD    Anesthesia Type: MAC ASA Status: 3            Anesthesia Type: MAC    Vitals  Vitals Value Taken Time   /91 04/19/24 0832   Temp 36.4 °C (97.6 °F) 04/19/24 0805   Pulse 64 04/19/24 0839   Resp 20 04/19/24 0830   SpO2 100 % 04/19/24 0839   Vitals shown include unfiled device data.        Post Anesthesia Care and Evaluation    Patient location during evaluation: bedside  Patient participation: complete - patient participated  Level of consciousness: awake  Pain management: adequate    Airway patency: patent  PONV Status: none  Cardiovascular status: acceptable and stable  Respiratory status: acceptable  Hydration status: acceptable    Comments: An Anesthesiologist personally participated in the most demanding procedures (including induction and emergence if applicable) in the anesthesia plan, monitored the course of anesthesia administration at frequent intervals and remained physically present and available for immediate diagnosis and treatment of emergencies.

## 2024-04-19 NOTE — ANESTHESIA PREPROCEDURE EVALUATION
Anesthesia Evaluation     Patient summary reviewed and Nursing notes reviewed   no history of anesthetic complications:   NPO Solid Status: > 8 hours  NPO Liquid Status: > 2 hours           Airway   Mallampati: I  TM distance: >3 FB  Neck ROM: full  Dental    (+) edentulous    Pulmonary - normal exam   (+) COPD, asthma,  Cardiovascular - normal exam  Exercise tolerance: poor (<4 METS)    (+) hypertension, PVD, hyperlipidemia      Neuro/Psych  (+) headaches, numbness, psychiatric history  GI/Hepatic/Renal/Endo    (+) GERD, renal disease-, thyroid problem hypothyroidism    Musculoskeletal     (+) back pain  Abdominal  - normal exam   Substance History - negative use     OB/GYN negative ob/gyn ROS         Other   arthritis,   history of cancer    ROS/Med Hx Other: ON DAILY PREDNISONE                     Anesthesia Plan    ASA 3     MAC   total IV anesthesia  intravenous induction     Anesthetic plan, risks, benefits, and alternatives have been provided, discussed and informed consent has been obtained with: patient.    Plan discussed with CRNA.        CODE STATUS:

## 2024-04-19 NOTE — OP NOTE
OP NOTE  INTERSTIM STAGE 1 LEAD PLACEMENT WITH ROLANDO  Procedure Report    Patient Name:  Nora Bone  YOB: 1955  [id number]    Date of Surgery:  4/19/2024     Indications: See last clinic note for indications, discussion of risk benefits and alternatives    Pre-op Diagnosis:   Full incontinence of feces [R15.9]       Post-Op Diagnosis Codes:     * Full incontinence of feces [R15.9]    Procedure/CPT® Codes:      Procedure(s):  basic peripheral nerve evaluation    Staff:  Surgeon(s):  Roque Dowd MD    Assistant: mAy Stone CSA    Anesthesia: Monitored Anesthesia Care, Local    Estimated Blood Loss: 1 mL    Implants:    Implant Name Type Inv. Item Serial No.  Lot No. LRB No. Used Action   LD NEUROSTM SACRAL PNE - [id number] Implant LD NEUROSTM SACRAL PNE  [employer] [id number] N/A 1 Implanted       Specimen:          None      Findings: Bilateral peripheral nerve leads placed to S3 foramen.    Complications: None    Description of Procedure:   After all questions were answered, consent was verified.  Patient was brought the operating room per stretcher placed in supine position arms out all extremities padded.  Patient rotated prone.  All extremities padded.  Anesthesia induced.  Patient's lower back and around her buttocks prepped with ChloraPrep.  We waited 3 minutes.  Draped in usual sterile fashion.  Ioban used to cover the anus.  Critical timeout taken.  Began the procedure measuring my landmarks over the S3 foramen.  I then infiltrated local anesthesia bilaterally.  I then placed a foramen needle stylette into the right S3 foramen.  Excellent bellowing and plantarflexion of the great toe noted with the external test stimulator cable.  We then removed the stylette of the foramen needle and placed the peripheral lead removing the foramen needle.  This was repeated on the patient's left side in a similar fashion.  The leads were then  connected to the ground pads and stimulator cables in the usual fashion.  Dressing placed.  She was programmed.  At the end of the procedure all counts were correct.  I was present for the procedure.    Assistant: Amy Stone CSA was responsible for performing the following activities: Placing Dressing and their skilled assistance was necessary for the success of this case.    Roque Dowd MD     Date: 4/19/2024  Time: 08:07 EDT

## 2024-04-23 ENCOUNTER — TELEPHONE (OUTPATIENT)
Dept: SURGERY | Facility: CLINIC | Age: 69
End: 2024-04-23
Payer: MEDICARE

## 2024-04-23 NOTE — TELEPHONE ENCOUNTER
Patient went to the bathroom this morning. When she sat down the wires came out of her back. The machine is not working at all. 1 wire is still attached. She said that she did successfully do 3 days and wants with the next step. # [phone]

## 2024-04-23 NOTE — TELEPHONE ENCOUNTER
PATIENT HAS BEEN INFORMED AND VOICED UNDERSTANDING.     DR STANFORD REACHED OUT TO [facility] AND THEY WILL BE CONTACTING HER TODAY. DISCUSSED CHANGING APPT BUT SHE STATED SHE WILL KEEP HER APPT FOR THURSDAY.

## 2024-04-25 ENCOUNTER — OFFICE VISIT (OUTPATIENT)
Dept: SURGERY | Facility: CLINIC | Age: 69
End: 2024-04-25
Payer: MEDICARE

## 2024-04-25 ENCOUNTER — PREP FOR SURGERY (OUTPATIENT)
Dept: OTHER | Facility: HOSPITAL | Age: 69
End: 2024-04-25
Payer: MEDICARE

## 2024-04-25 VITALS — RESPIRATION RATE: 14 BRPM | BODY MASS INDEX: 22.3 KG/M2 | WEIGHT: 130.6 LBS | HEIGHT: 64 IN

## 2024-04-25 DIAGNOSIS — R15.9 FULL INCONTINENCE OF FECES: Primary | ICD-10-CM

## 2024-04-25 RX ORDER — SODIUM CHLORIDE 0.9 % (FLUSH) 0.9 %
10 SYRINGE (ML) INJECTION EVERY 12 HOURS SCHEDULED
OUTPATIENT
Start: 2024-04-25

## 2024-04-25 RX ORDER — SODIUM CHLORIDE 9 MG/ML
40 INJECTION, SOLUTION INTRAVENOUS AS NEEDED
OUTPATIENT
Start: 2024-04-25

## 2024-04-25 RX ORDER — SODIUM CHLORIDE, SODIUM LACTATE, POTASSIUM CHLORIDE, CALCIUM CHLORIDE 600; 310; 30; 20 MG/100ML; MG/100ML; MG/100ML; MG/100ML
50 INJECTION, SOLUTION INTRAVENOUS CONTINUOUS
OUTPATIENT
Start: 2024-04-25

## 2024-04-25 RX ORDER — CEFAZOLIN SODIUM 2 G/100ML
2000 INJECTION, SOLUTION INTRAVENOUS ONCE
OUTPATIENT
Start: 2024-04-25 | End: 2024-04-25

## 2024-04-25 RX ORDER — SODIUM CHLORIDE 0.9 % (FLUSH) 0.9 %
10 SYRINGE (ML) INJECTION AS NEEDED
OUTPATIENT
Start: 2024-04-25

## 2024-04-25 NOTE — LETTER
April 26, 2024       No Recipients    Patient: Nora Bone   YOB: 1955   Date of Visit: 4/25/2024     Dear Jarrod Simms MD:       Thank you for referring Nora Bone to me for evaluation. Below are the relevant portions of my assessment and plan of care.    If you have questions, please do not hesitate to call me. I look forward to following Nora along with you.         Sincerely,        Roque Dowd MD        CC:   No Recipients    Roque Dowd MD  04/26/24 0718  Sign when Signing Visit  General Surgery/Colorectal Surgery   Post -op Follow up Note    Patient Name:  Nora Bone  YOB: 1955  [id number]    Referring Provider: No ref. provider found    Patient Care Team:  Jarrod Simms MD as PCP - General (Family Medicine)    Chief complaint follow-up    Subjective.     History of present illness:    History of fecal incontinence    Status post basic peripheral nerve evaluation 4/19/2024    She comes in for follow-up.  She has had a greater than 50% improvement of her symptoms and has passed the trial.  She wishes to proceed with pacemaker implant.    History:  Past Medical History:   Diagnosis Date   • Anemia     NO CURRENT ISSUES GETS MONTHLY SHOTS   • Anxiety    • Asthma     INHALER, CONTROLLED   • Back pain     OCCASSIONAL   • Cervical cancer    • COPD (chronic obstructive pulmonary disease)     no O2 use   • Deep vein thrombosis     NO CURRENT ISSUES NO THINNERS   • Disease of thyroid gland    • Diverticulosis    • Fecal incontinence    • Hearing loss    • Hyperlipidemia    • Hypertension     NO MEDS   • Muscle weakness     detereration   • Peripheral vascular disease     WEARS SUPPORT HOSE/R VARICOSE VEINS   • Polymyositis     FOLLOWS BROCK, DAILY PREDNISONE       Past Surgical History:   Procedure Laterality Date   • CARPAL TUNNEL RELEASE Bilateral    • COLONOSCOPY W/ BIOPSIES     • DILATATION AND CURETTAGE     • INTERSTIM PLACEMENT N/A  "2024    Procedure: basic peripheral nerve evaluation;  Surgeon: Roque Dowd MD;  [place] [facility] [place] [facility] [place];  Service: General;  Laterality: N/A;   • MUSCLE BIOPSY Right 2024    Procedure: MUSCLE BIOPSY right thigh;  Surgeon: Roque Dowd MD;  Location: [facility] [place] [facility] [place];  Service: General;  Laterality: Right;   • VAGINAL HYSTERECTOMY     • VEIN SURGERY Right     STRIPPING       Family History   Problem Relation Age of Onset   • Ovarian cancer Mother    • Heart disease Mother    • Thyroid disease Mother    • Epilepsy Father    • Breast cancer Sister    • Breast cancer Paternal Aunt    • Malig Hyperthermia Neg Hx        Social History     Tobacco Use   • Smoking status: Former     Current packs/day: 0.00     Average packs/day: 1 pack/day for 20.0 years (20.0 ttl pk-yrs)     Types: Cigarettes     Start date: 1995     Quit date: 2015     Years since quittin.3     Passive exposure: Never   • Smokeless tobacco: Never   Vaping Use   • Vaping status: Former   Substance Use Topics   • Alcohol use: Never   • Drug use: Yes     Types: Marijuana     Comment: OCCASSIONAL PRN PAIN/ANXIETY/INST PER ANESTHESIA PROTOCOL       MEDS:  Prior to Admission medications    Medication Sig Start Date End Date Taking? Authorizing Provider   albuterol sulfate  (90 Base) MCG/ACT inhaler Inhale 2 puffs Every 6 (Six) Hours As Needed for Wheezing. 3/11/24  Yes Jarrod Simms MD   amitriptyline (ELAVIL) 25 MG tablet Take 1 tablet by mouth Every Night. 23  Yes Jarrod Simms MD   B-D 3CC LUER-LUCINA SYR 25GX1\" 25G X 1\" 3 ML misc use once monthly for b12 injection 22  Yes Jarrod Simms MD   Calcium Carb-Cholecalciferol (Oyster Shell Calcium w/D) 500-5 MG-MCG tablet TAKE 1 TABLET BY MOUTH TWICE DAILY  Patient taking differently: Take 1 tablet by mouth 2 (Two) Times a Day. LD 4/17/24 10/9/23  Yes Jarrod Simms MD   cyanocobalamin 1000 MCG/ML injection INJECT 1ML INTO THE " APPROPRIATE MUSCLE AS DIRECTED BY PRESCRIBER EVERY 28 DAYS.  Patient taking differently: 1 mL Every 28 (Twenty-Eight) Days. 7/6/23  Yes Jarrod Simms MD   fluticasone (FLONASE) 50 MCG/ACT nasal spray 1 spray into the nostril(s) as directed by provider Daily.  Patient taking differently: 1 spray into the nostril(s) as directed by provider 2 (Two) Times a Day. 9/12/23  Yes Jarrod Simms MD   levothyroxine (SYNTHROID, LEVOTHROID) 88 MCG tablet Take 1 tablet by mouth Daily. CURRENT DOSE 3/11/24  Yes Jarrod Simms MD   polyethylene glycol (MIRALAX) 17 g packet Take 17 g by mouth Daily. 2/26/24  Yes Roque Dowd MD   predniSONE (DELTASONE) 10 MG tablet Take 4 tablets by mouth Daily. 4/4/24  Yes Edna Damon MD   Probiotic Product (Savorfull PO) Take  by mouth. LAST DOSE 4/17/24   Yes Edna Damon MD   Symbicort 160-4.5 MCG/ACT inhaler Inhale 2 puffs 2 (Two) Times a Day. 3/11/24  Yes Jarrod Simms MD   traMADol (Ultram) 50 MG tablet Take 1 tablet by mouth Every 6 (Six) Hours As Needed for Moderate Pain. 4/19/24 4/19/25 Yes Roque Dowd MD             No current facility-administered medications for this visit.       Allergies:  Hydrocodone-acetaminophen, Pollen extract, Molds & smuts, and Percocet [oxycodone-acetaminophen]    Objective    Vital Signs   Resp:  [14] 14    Physical Exam:     Leads removed intact      Results Review: I have reviewed the patient's labs and imaging    LABS/IMAGING:    Imaging Results (Last 72 Hours)       ** No results found for the last 72 hours. **             Lab Results (last 72 hours)       ** No results found for the last 72 hours. **               Result Review:     Assessment & Plan    * No active hospital problems. *     History of fecal incontinence    Status post basic peripheral nerve evaluation 4/19/2024    I removed her leads intact.  She is okay to shower.  I recommended full implant procedure.  I  explained the procedure and recovery.  Benefits and alternatives discussed.  Risk procedure including risk of anesthesia, bleeding, infection, failure of the device, heart attack, stroke, blood clot, pneumonia discussed.  All questions answered.  She agrees with the plan.  Orders placed.  She was instructed to use chlorhexidine the night before surgery.  Thank you for the consult.          Roque Dowd MD  04/26/24 07:16 EDT

## 2024-04-26 NOTE — H&P (VIEW-ONLY)
General Surgery/Colorectal Surgery   Post -op Follow up Note    Patient Name:  Nora Bone  YOB: 1955  [id number]    Referring Provider: No ref. provider found    Patient Care Team:  Jarrod Simms MD as PCP - General (Family Medicine)    Chief complaint follow-up    Subjective .     History of present illness:    History of fecal incontinence    Status post basic peripheral nerve evaluation 4/19/2024    She comes in for follow-up.  She has had a greater than 50% improvement of her symptoms and has passed the trial.  She wishes to proceed with pacemaker implant.    History:  Past Medical History:   Diagnosis Date    Anemia     NO CURRENT ISSUES GETS MONTHLY SHOTS    Anxiety     Asthma     INHALER, CONTROLLED    Back pain     OCCASSIONAL    Cervical cancer     COPD (chronic obstructive pulmonary disease)     no O2 use    Deep vein thrombosis     NO CURRENT ISSUES NO THINNERS    Disease of thyroid gland     Diverticulosis     Fecal incontinence     Hearing loss     Hyperlipidemia     Hypertension     NO MEDS    Muscle weakness     detereration    Peripheral vascular disease     WEARS SUPPORT HOSE/R VARICOSE VEINS    Polymyositis     FOLLOWS BROCK, DAILY PREDNISONE       Past Surgical History:   Procedure Laterality Date    CARPAL TUNNEL RELEASE Bilateral     COLONOSCOPY W/ BIOPSIES      DILATATION AND CURETTAGE      INTERSTIM PLACEMENT N/A 4/19/2024    Procedure: basic peripheral nerve evaluation;  Surgeon: Roque Dowd MD;  [place] [facility] [place] [facility] [place]  Service: General;  Laterality: N/A;    MUSCLE BIOPSY Right 02/26/2024    Procedure: MUSCLE BIOPSY right thigh;  Surgeon: Roque Dowd MD;  [place] [facility] [place];  Service: General;  Laterality: Right;    VAGINAL HYSTERECTOMY      VEIN SURGERY Right     STRIPPING       Family History   Problem Relation Age of Onset    Ovarian cancer Mother     Heart disease Mother     Thyroid disease Mother     Epilepsy Father     Breast  "cancer Sister     Breast cancer Paternal Aunt     Malig Hyperthermia Neg Hx        Social History     Tobacco Use    Smoking status: Former     Current packs/day: 0.00     Average packs/day: 1 pack/day for 20.0 years (20.0 ttl pk-yrs)     Types: Cigarettes     Start date: 1995     Quit date: 2015     Years since quittin.3     Passive exposure: Never    Smokeless tobacco: Never   Vaping Use    Vaping status: Former   Substance Use Topics    Alcohol use: Never    Drug use: Yes     Types: Marijuana     Comment: OCCASSIONAL PRN PAIN/ANXIETY/INST PER ANESTHESIA PROTOCOL       MEDS:  Prior to Admission medications    Medication Sig Start Date End Date Taking? Authorizing Provider   albuterol sulfate  (90 Base) MCG/ACT inhaler Inhale 2 puffs Every 6 (Six) Hours As Needed for Wheezing. 3/11/24  Yes Jarrod Simms MD   amitriptyline (ELAVIL) 25 MG tablet Take 1 tablet by mouth Every Night. 23  Yes Jarrod Simms MD   B-D 3CC LUER-LUCINA SYR 25GX1\" 25G X 1\" 3 ML misc use once monthly for b12 injection 22  Yes Jarrod Simms MD   Calcium Carb-Cholecalciferol (Oyster Shell Calcium w/D) 500-5 MG-MCG tablet TAKE 1 TABLET BY MOUTH TWICE DAILY  Patient taking differently: Take 1 tablet by mouth 2 (Two) Times a Day. LD 4/17/24 10/9/23  Yes Jarrod Simms MD   cyanocobalamin 1000 MCG/ML injection INJECT 1ML INTO THE APPROPRIATE MUSCLE AS DIRECTED BY PRESCRIBER EVERY 28 DAYS.  Patient taking differently: 1 mL Every 28 (Twenty-Eight) Days. 23  Yes Jarrod Simms MD   fluticasone (FLONASE) 50 MCG/ACT nasal spray 1 spray into the nostril(s) as directed by provider Daily.  Patient taking differently: 1 spray into the nostril(s) as directed by provider 2 (Two) Times a Day. 23  Yes Jarrod Simms MD   levothyroxine (SYNTHROID, LEVOTHROID) 88 MCG tablet Take 1 tablet by mouth Daily. CURRENT DOSE 3/11/24  Yes Jarrod Simms MD   polyethylene glycol (MIRALAX) " 17 g packet Take 17 g by mouth Daily. 2/26/24  Yes Roque Dowd MD   predniSONE (DELTASONE) 10 MG tablet Take 4 tablets by mouth Daily. 4/4/24  Yes Edna Damon MD   Probiotic Product (TipTap PO) Take  by mouth. LAST DOSE 4/17/24   Yes Edna Damon MD   Symbicort 160-4.5 MCG/ACT inhaler Inhale 2 puffs 2 (Two) Times a Day. 3/11/24  Yes Jarrod Simms MD   traMADol (Ultram) 50 MG tablet Take 1 tablet by mouth Every 6 (Six) Hours As Needed for Moderate Pain. 4/19/24 4/19/25 Yes Roque Dowd MD             No current facility-administered medications for this visit.       Allergies:  Hydrocodone-acetaminophen, Pollen extract, Molds & smuts, and Percocet [oxycodone-acetaminophen]    Objective     Vital Signs   Resp:  [14] 14    Physical Exam:     Leads removed intact      Results Review: I have reviewed the patient's labs and imaging    LABS/IMAGING:    Imaging Results (Last 72 Hours)       ** No results found for the last 72 hours. **             Lab Results (last 72 hours)       ** No results found for the last 72 hours. **               Result Review :     Assessment & Plan     * No active hospital problems. *     History of fecal incontinence    Status post basic peripheral nerve evaluation 4/19/2024    I removed her leads intact.  She is okay to shower.  I recommended full implant procedure.  I explained the procedure and recovery.  Benefits and alternatives discussed.  Risk procedure including risk of anesthesia, bleeding, infection, failure of the device, heart attack, stroke, blood clot, pneumonia discussed.  All questions answered.  She agrees with the plan.  Orders placed.  She was instructed to use chlorhexidine the night before surgery.  Thank you for the consult.          Roque Dowd MD  04/26/24 07:16 EDT

## 2024-04-26 NOTE — PROGRESS NOTES
General Surgery/Colorectal Surgery   Post -op Follow up Note    Patient Name:  Nora Bone  YOB: 1955  [id number]    Referring Provider: No ref. provider found    Patient Care Team:  Jarrod Simms MD as PCP - General (Family Medicine)    Chief complaint follow-up    Subjective .     History of present illness:    History of fecal incontinence    Status post basic peripheral nerve evaluation 4/19/2024    She comes in for follow-up.  She has had a greater than 50% improvement of her symptoms and has passed the trial.  She wishes to proceed with pacemaker implant.    History:  Past Medical History:   Diagnosis Date    Anemia     NO CURRENT ISSUES GETS MONTHLY SHOTS    Anxiety     Asthma     INHALER, CONTROLLED    Back pain     OCCASSIONAL    Cervical cancer     COPD (chronic obstructive pulmonary disease)     no O2 use    Deep vein thrombosis     NO CURRENT ISSUES NO THINNERS    Disease of thyroid gland     Diverticulosis     Fecal incontinence     Hearing loss     Hyperlipidemia     Hypertension     NO MEDS    Muscle weakness     detereration    Peripheral vascular disease     WEARS SUPPORT HOSE/R VARICOSE VEINS    Polymyositis     FOLLOWS BROCK, DAILY PREDNISONE       Past Surgical History:   Procedure Laterality Date    CARPAL TUNNEL RELEASE Bilateral     COLONOSCOPY W/ BIOPSIES      DILATATION AND CURETTAGE      INTERSTIM PLACEMENT N/A 4/19/2024    Procedure: basic peripheral nerve evaluation;  Surgeon: Roque Dowd MD;  [place] [facility] [place] [facility] [place]  Service: General;  Laterality: N/A;    MUSCLE BIOPSY Right 02/26/2024    Procedure: MUSCLE BIOPSY right thigh;  Surgeon: Roque Dowd MD;  [place] [facility] [place] [facility] [place]  Service: General;  Laterality: Right;    VAGINAL HYSTERECTOMY      VEIN SURGERY Right     STRIPPING       Family History   Problem Relation Age of Onset    Ovarian cancer Mother     Heart disease Mother     Thyroid disease Mother     Epilepsy Father     Breast  "cancer Sister     Breast cancer Paternal Aunt     Malig Hyperthermia Neg Hx        Social History     Tobacco Use    Smoking status: Former     Current packs/day: 0.00     Average packs/day: 1 pack/day for 20.0 years (20.0 ttl pk-yrs)     Types: Cigarettes     Start date: 1995     Quit date: 2015     Years since quittin.3     Passive exposure: Never    Smokeless tobacco: Never   Vaping Use    Vaping status: Former   Substance Use Topics    Alcohol use: Never    Drug use: Yes     Types: Marijuana     Comment: OCCASSIONAL PRN PAIN/ANXIETY/INST PER ANESTHESIA PROTOCOL       MEDS:  Prior to Admission medications    Medication Sig Start Date End Date Taking? Authorizing Provider   albuterol sulfate  (90 Base) MCG/ACT inhaler Inhale 2 puffs Every 6 (Six) Hours As Needed for Wheezing. 3/11/24  Yes Jarrod Simms MD   amitriptyline (ELAVIL) 25 MG tablet Take 1 tablet by mouth Every Night. 23  Yes Jarrod Simms MD   B-D 3CC LUER-LUCINA SYR 25GX1\" 25G X 1\" 3 ML misc use once monthly for b12 injection 22  Yes Jarrod Simms MD   Calcium Carb-Cholecalciferol (Oyster Shell Calcium w/D) 500-5 MG-MCG tablet TAKE 1 TABLET BY MOUTH TWICE DAILY  Patient taking differently: Take 1 tablet by mouth 2 (Two) Times a Day. LD 4/17/24 10/9/23  Yes Jarrod Simms MD   cyanocobalamin 1000 MCG/ML injection INJECT 1ML INTO THE APPROPRIATE MUSCLE AS DIRECTED BY PRESCRIBER EVERY 28 DAYS.  Patient taking differently: 1 mL Every 28 (Twenty-Eight) Days. 23  Yes Jarrod Simms MD   fluticasone (FLONASE) 50 MCG/ACT nasal spray 1 spray into the nostril(s) as directed by provider Daily.  Patient taking differently: 1 spray into the nostril(s) as directed by provider 2 (Two) Times a Day. 23  Yes Jarrod Simms MD   levothyroxine (SYNTHROID, LEVOTHROID) 88 MCG tablet Take 1 tablet by mouth Daily. CURRENT DOSE 3/11/24  Yes Jarrod Simms MD   polyethylene glycol (MIRALAX) " 17 g packet Take 17 g by mouth Daily. 2/26/24  Yes Roque Dowd MD   predniSONE (DELTASONE) 10 MG tablet Take 4 tablets by mouth Daily. 4/4/24  Yes Edna Damon MD   Probiotic Product ([employer]vify PO) Take  by mouth. LAST DOSE 4/17/24   Yes Edna Damon MD   Symbicort 160-4.5 MCG/ACT inhaler Inhale 2 puffs 2 (Two) Times a Day. 3/11/24  Yes Jarrod Simms MD   traMADol (Ultram) 50 MG tablet Take 1 tablet by mouth Every 6 (Six) Hours As Needed for Moderate Pain. 4/19/24 4/19/25 Yes Roque Dowd MD             No current facility-administered medications for this visit.       Allergies:  Hydrocodone-acetaminophen, Pollen extract, Molds & smuts, and Percocet [oxycodone-acetaminophen]    Objective     Vital Signs   Resp:  [14] 14    Physical Exam:     Leads removed intact      Results Review: I have reviewed the patient's labs and imaging    LABS/IMAGING:    Imaging Results (Last 72 Hours)       ** No results found for the last 72 hours. **             Lab Results (last 72 hours)       ** No results found for the last 72 hours. **               Result Review :     Assessment & Plan     * No active hospital problems. *     History of fecal incontinence    Status post basic peripheral nerve evaluation 4/19/2024    I removed her leads intact.  She is okay to shower.  I recommended full implant procedure.  I explained the procedure and recovery.  Benefits and alternatives discussed.  Risk procedure including risk of anesthesia, bleeding, infection, failure of the device, heart attack, stroke, blood clot, pneumonia discussed.  All questions answered.  She agrees with the plan.  Orders placed.  She was instructed to use chlorhexidine the night before surgery.  Thank you for the consult.          Roque Dowd MD  04/26/24 07:16 EDT

## 2024-04-30 NOTE — PRE-PROCEDURE INSTRUCTIONS
IMPORTANT INSTRUCTIONS - PRE-ADMISSION TESTING  DO NOT EAT OR DRINK  anything after midnight the night before your procedure.      Take the following medications the morning of your procedure with JUST A SIP OF WATER:  _PREDNISONE, SYMBICORT, ALBUTEROL ______________________________________________________________________________________________________________________________________________________________________________________    DO NOT BRING your medications to the hospital with you, UNLESS something has changed since your PRE-Admission Testing appointment.  Hold all vitamins, supplements, and NSAIDS (Non- steroidal anti-inflammatory meds) for one week prior to surgery (you MAY take Tylenol or Acetaminophen).  If you are diabetic, check your blood sugar the morning of your procedure. If it is less than 70 or if you are feeling symptomatic, call the following number for further instructions: [phone]  Use your inhalers/nebulizers as usual, the morning of your procedure. BRING YOUR INHALERS with you.   Bring your CPAP or BIPAP to hospital, ONLY IF YOU WILL BE SPENDING THE NIGHT.   Make sure you have a ride home and have someone who will stay with you the day of your procedure after you go home.  If you have any questions, please call your Pre-Admission Testing Nurse, _____ALICIA____ at [phone]____.   Per anesthesia request, do not smoke for 24 hours before your procedure or as instructed by your surgeon.

## 2024-05-01 ENCOUNTER — TELEPHONE (OUTPATIENT)
Dept: GASTROENTEROLOGY | Facility: CLINIC | Age: 69
End: 2024-05-01
Payer: MEDICARE

## 2024-05-01 NOTE — TELEPHONE ENCOUNTER
Attempted to contact patient about the cancellation list.     Patient returned phone call and she stated she is having surgery this Friday and didn't want to come into the office. Advised that is was just an office visit for her symptoms. Patient stated she will take care of this appointment later she has a surgery this week she is needing to take care of.

## 2024-05-02 ENCOUNTER — ANESTHESIA EVENT (OUTPATIENT)
Dept: PERIOP | Facility: HOSPITAL | Age: 69
End: 2024-05-02
Payer: MEDICARE

## 2024-05-02 DIAGNOSIS — M19.90 ARTHRITIS: ICD-10-CM

## 2024-05-02 RX ORDER — AMITRIPTYLINE HYDROCHLORIDE 25 MG/1
25 TABLET, FILM COATED ORAL NIGHTLY
Qty: 90 TABLET | Refills: 1 | Status: SHIPPED | OUTPATIENT
Start: 2024-05-02

## 2024-05-03 ENCOUNTER — APPOINTMENT (OUTPATIENT)
Dept: GENERAL RADIOLOGY | Facility: HOSPITAL | Age: 69
End: 2024-05-03
Payer: MEDICARE

## 2024-05-03 ENCOUNTER — ANESTHESIA (OUTPATIENT)
Dept: PERIOP | Facility: HOSPITAL | Age: 69
End: 2024-05-03
Payer: MEDICARE

## 2024-05-03 ENCOUNTER — HOSPITAL ENCOUNTER (OUTPATIENT)
Facility: HOSPITAL | Age: 69
Setting detail: HOSPITAL OUTPATIENT SURGERY
Discharge: HOME | End: 2024-05-03
Attending: SURGERY | Admitting: SURGERY
Payer: MEDICARE

## 2024-05-03 VITALS
HEIGHT: 63 IN | RESPIRATION RATE: 20 BRPM | WEIGHT: 131.84 LBS | BODY MASS INDEX: 23.36 KG/M2 | HEART RATE: 65 BPM | OXYGEN SATURATION: 98 % | DIASTOLIC BLOOD PRESSURE: 79 MMHG | TEMPERATURE: 98.3 F | SYSTOLIC BLOOD PRESSURE: 152 MMHG

## 2024-05-03 DIAGNOSIS — R15.9 FULL INCONTINENCE OF FECES: ICD-10-CM

## 2024-05-03 PROCEDURE — 93005 ELECTROCARDIOGRAM TRACING: CPT | Performed by: ANESTHESIOLOGY

## 2024-05-03 PROCEDURE — 25810000003 LACTATED RINGERS PER 1000 ML: Performed by: ANESTHESIOLOGY

## 2024-05-03 PROCEDURE — 25010000002 SUGAMMADEX 200 MG/2ML SOLUTION: Performed by: NURSE ANESTHETIST, CERTIFIED REGISTERED

## 2024-05-03 PROCEDURE — 25010000002 MIDAZOLAM PER 1MG: Performed by: ANESTHESIOLOGY

## 2024-05-03 PROCEDURE — 25010000002 PROPOFOL 10 MG/ML EMULSION: Performed by: NURSE ANESTHETIST, CERTIFIED REGISTERED

## 2024-05-03 PROCEDURE — 25010000002 DEXAMETHASONE PER 1 MG: Performed by: NURSE ANESTHETIST, CERTIFIED REGISTERED

## 2024-05-03 PROCEDURE — 25010000002 LIDOCAINE 1 % SOLUTION: Performed by: SURGERY

## 2024-05-03 PROCEDURE — C1894 INTRO/SHEATH, NON-LASER: HCPCS | Performed by: SURGERY

## 2024-05-03 PROCEDURE — 25010000002 CEFAZOLIN PER 500 MG: Performed by: SURGERY

## 2024-05-03 PROCEDURE — L8689 EXTERNAL RECHARG SYS INTERN: HCPCS | Performed by: SURGERY

## 2024-05-03 PROCEDURE — 25010000002 FENTANYL CITRATE (PF) 50 MCG/ML SOLUTION: Performed by: NURSE ANESTHETIST, CERTIFIED REGISTERED

## 2024-05-03 PROCEDURE — C1787 PATIENT PROGR, NEUROSTIM: HCPCS | Performed by: SURGERY

## 2024-05-03 PROCEDURE — C1778 LEAD, NEUROSTIMULATOR: HCPCS | Performed by: SURGERY

## 2024-05-03 PROCEDURE — C1767 GENERATOR, NEURO NON-RECHARG: HCPCS | Performed by: SURGERY

## 2024-05-03 PROCEDURE — 76000 FLUOROSCOPY <1 HR PHYS/QHP: CPT

## 2024-05-03 PROCEDURE — 25010000002 ONDANSETRON PER 1 MG: Performed by: NURSE ANESTHETIST, CERTIFIED REGISTERED

## 2024-05-03 DEVICE — NEUROSTIMULATOR
Type: IMPLANTABLE DEVICE | Site: SACRUM | Status: FUNCTIONAL
Brand: AXONICS

## 2024-05-03 DEVICE — TINED LEAD KIT
Type: IMPLANTABLE DEVICE | Site: BACK | Status: FUNCTIONAL
Brand: AXONICS

## 2024-05-03 RX ORDER — SODIUM CHLORIDE 0.9 % (FLUSH) 0.9 %
10 SYRINGE (ML) INJECTION EVERY 12 HOURS SCHEDULED
Status: DISCONTINUED | OUTPATIENT
Start: 2024-05-03 | End: 2024-05-03 | Stop reason: HOSPADM

## 2024-05-03 RX ORDER — PROMETHAZINE HYDROCHLORIDE 25 MG/1
25 SUPPOSITORY RECTAL ONCE AS NEEDED
Status: DISCONTINUED | OUTPATIENT
Start: 2024-05-03 | End: 2024-05-03 | Stop reason: HOSPADM

## 2024-05-03 RX ORDER — PHENYLEPHRINE HCL IN 0.9% NACL 1 MG/10 ML
SYRINGE (ML) INTRAVENOUS AS NEEDED
Status: DISCONTINUED | OUTPATIENT
Start: 2024-05-03 | End: 2024-05-03 | Stop reason: SURG

## 2024-05-03 RX ORDER — SODIUM CHLORIDE, SODIUM LACTATE, POTASSIUM CHLORIDE, CALCIUM CHLORIDE 600; 310; 30; 20 MG/100ML; MG/100ML; MG/100ML; MG/100ML
9 INJECTION, SOLUTION INTRAVENOUS CONTINUOUS PRN
Status: DISCONTINUED | OUTPATIENT
Start: 2024-05-03 | End: 2024-05-03 | Stop reason: HOSPADM

## 2024-05-03 RX ORDER — ONDANSETRON 2 MG/ML
INJECTION INTRAMUSCULAR; INTRAVENOUS AS NEEDED
Status: DISCONTINUED | OUTPATIENT
Start: 2024-05-03 | End: 2024-05-03 | Stop reason: SURG

## 2024-05-03 RX ORDER — PROPOFOL 10 MG/ML
VIAL (ML) INTRAVENOUS AS NEEDED
Status: DISCONTINUED | OUTPATIENT
Start: 2024-05-03 | End: 2024-05-03 | Stop reason: SURG

## 2024-05-03 RX ORDER — BUPIVACAINE HCL/0.9 % NACL/PF 0.1 %
2000 PLASTIC BAG, INJECTION (ML) EPIDURAL ONCE
Status: COMPLETED | OUTPATIENT
Start: 2024-05-03 | End: 2024-05-03

## 2024-05-03 RX ORDER — SODIUM CHLORIDE, SODIUM LACTATE, POTASSIUM CHLORIDE, CALCIUM CHLORIDE 600; 310; 30; 20 MG/100ML; MG/100ML; MG/100ML; MG/100ML
50 INJECTION, SOLUTION INTRAVENOUS CONTINUOUS
Status: DISCONTINUED | OUTPATIENT
Start: 2024-05-03 | End: 2024-05-03 | Stop reason: HOSPADM

## 2024-05-03 RX ORDER — MIDAZOLAM HYDROCHLORIDE 2 MG/2ML
1 INJECTION, SOLUTION INTRAMUSCULAR; INTRAVENOUS ONCE
Status: COMPLETED | OUTPATIENT
Start: 2024-05-03 | End: 2024-05-03

## 2024-05-03 RX ORDER — FENTANYL CITRATE 50 UG/ML
INJECTION, SOLUTION INTRAMUSCULAR; INTRAVENOUS AS NEEDED
Status: DISCONTINUED | OUTPATIENT
Start: 2024-05-03 | End: 2024-05-03 | Stop reason: SURG

## 2024-05-03 RX ORDER — DEXAMETHASONE SODIUM PHOSPHATE 4 MG/ML
INJECTION, SOLUTION INTRA-ARTICULAR; INTRALESIONAL; INTRAMUSCULAR; INTRAVENOUS; SOFT TISSUE AS NEEDED
Status: DISCONTINUED | OUTPATIENT
Start: 2024-05-03 | End: 2024-05-03 | Stop reason: SURG

## 2024-05-03 RX ORDER — ONDANSETRON 2 MG/ML
4 INJECTION INTRAMUSCULAR; INTRAVENOUS ONCE AS NEEDED
Status: DISCONTINUED | OUTPATIENT
Start: 2024-05-03 | End: 2024-05-03 | Stop reason: HOSPADM

## 2024-05-03 RX ORDER — SODIUM CHLORIDE 0.9 % (FLUSH) 0.9 %
10 SYRINGE (ML) INJECTION AS NEEDED
Status: DISCONTINUED | OUTPATIENT
Start: 2024-05-03 | End: 2024-05-03 | Stop reason: HOSPADM

## 2024-05-03 RX ORDER — OXYCODONE HYDROCHLORIDE 5 MG/1
5 TABLET ORAL
Status: DISCONTINUED | OUTPATIENT
Start: 2024-05-03 | End: 2024-05-03 | Stop reason: HOSPADM

## 2024-05-03 RX ORDER — LIDOCAINE HYDROCHLORIDE 20 MG/ML
INJECTION, SOLUTION EPIDURAL; INFILTRATION; INTRACAUDAL; PERINEURAL AS NEEDED
Status: DISCONTINUED | OUTPATIENT
Start: 2024-05-03 | End: 2024-05-03 | Stop reason: SURG

## 2024-05-03 RX ORDER — ROCURONIUM BROMIDE 10 MG/ML
INJECTION, SOLUTION INTRAVENOUS AS NEEDED
Status: DISCONTINUED | OUTPATIENT
Start: 2024-05-03 | End: 2024-05-03 | Stop reason: SURG

## 2024-05-03 RX ORDER — ACETAMINOPHEN AND CODEINE PHOSPHATE 300; 30 MG/1; MG/1
1 TABLET ORAL EVERY 4 HOURS PRN
Qty: 8 TABLET | Refills: 0 | Status: SHIPPED | OUTPATIENT
Start: 2024-05-03 | End: 2025-05-03

## 2024-05-03 RX ORDER — LIDOCAINE HYDROCHLORIDE 10 MG/ML
INJECTION, SOLUTION INFILTRATION; PERINEURAL AS NEEDED
Status: DISCONTINUED | OUTPATIENT
Start: 2024-05-03 | End: 2024-05-03 | Stop reason: HOSPADM

## 2024-05-03 RX ORDER — PROMETHAZINE HYDROCHLORIDE 12.5 MG/1
25 TABLET ORAL ONCE AS NEEDED
Status: DISCONTINUED | OUTPATIENT
Start: 2024-05-03 | End: 2024-05-03 | Stop reason: HOSPADM

## 2024-05-03 RX ORDER — SODIUM CHLORIDE 9 MG/ML
40 INJECTION, SOLUTION INTRAVENOUS AS NEEDED
Status: DISCONTINUED | OUTPATIENT
Start: 2024-05-03 | End: 2024-05-03 | Stop reason: HOSPADM

## 2024-05-03 RX ADMIN — FENTANYL CITRATE 25 MCG: 50 INJECTION, SOLUTION INTRAMUSCULAR; INTRAVENOUS at 10:59

## 2024-05-03 RX ADMIN — Medication 100 MCG: at 10:36

## 2024-05-03 RX ADMIN — SODIUM CHLORIDE, POTASSIUM CHLORIDE, SODIUM LACTATE AND CALCIUM CHLORIDE 9 ML/HR: 600; 310; 30; 20 INJECTION, SOLUTION INTRAVENOUS at 09:57

## 2024-05-03 RX ADMIN — PROPOFOL 150 MG: 10 INJECTION, EMULSION INTRAVENOUS at 10:20

## 2024-05-03 RX ADMIN — ROCURONIUM BROMIDE 10 MG: 10 INJECTION, SOLUTION INTRAVENOUS at 10:20

## 2024-05-03 RX ADMIN — Medication 2000 MG: at 10:16

## 2024-05-03 RX ADMIN — Medication 100 MCG: at 10:42

## 2024-05-03 RX ADMIN — FENTANYL CITRATE 25 MCG: 50 INJECTION, SOLUTION INTRAMUSCULAR; INTRAVENOUS at 10:43

## 2024-05-03 RX ADMIN — LIDOCAINE HYDROCHLORIDE 60 MG: 20 INJECTION, SOLUTION INTRAVENOUS at 10:20

## 2024-05-03 RX ADMIN — FENTANYL CITRATE 25 MCG: 50 INJECTION, SOLUTION INTRAMUSCULAR; INTRAVENOUS at 10:49

## 2024-05-03 RX ADMIN — SUGAMMADEX 100 MG: 100 INJECTION, SOLUTION INTRAVENOUS at 10:30

## 2024-05-03 RX ADMIN — ONDANSETRON HYDROCHLORIDE 4 MG: 2 SOLUTION INTRAMUSCULAR; INTRAVENOUS at 10:20

## 2024-05-03 RX ADMIN — DEXAMETHASONE SODIUM PHOSPHATE 4 MG: 4 INJECTION, SOLUTION INTRAMUSCULAR; INTRAVENOUS at 11:31

## 2024-05-03 RX ADMIN — MIDAZOLAM HYDROCHLORIDE 1 MG: 1 INJECTION, SOLUTION INTRAMUSCULAR; INTRAVENOUS at 09:56

## 2024-05-03 RX ADMIN — FENTANYL CITRATE 25 MCG: 50 INJECTION, SOLUTION INTRAMUSCULAR; INTRAVENOUS at 10:20

## 2024-05-03 NOTE — ANESTHESIA PREPROCEDURE EVALUATION
Anesthesia Evaluation     Patient summary reviewed and Nursing notes reviewed   no history of anesthetic complications:   NPO Solid Status: > 8 hours  NPO Liquid Status: > 2 hours           Airway   Mallampati: II  TM distance: >3 FB  Neck ROM: full  No difficulty expected  Dental    (+) upper dentures    Pulmonary - normal exam    breath sounds clear to auscultation  (+) a smoker Former, COPD, asthma (controlled, feels breathing is good this AM),  Cardiovascular - normal exam  Exercise tolerance: good (4-7 METS)    Rhythm: regular  Rate: normal    (+) hypertension, DVT, hyperlipidemia      Neuro/Psych- negative ROS  GI/Hepatic/Renal/Endo - negative ROS   (+) thyroid problem hypothyroidism    Musculoskeletal (-) negative ROS    (+) myalgias (polymyositis)  Abdominal    Substance History - negative use     OB/GYN negative ob/gyn ROS         Other - negative ROS  arthritis,     ROS/Med Hx Other: PAT Nursing Notes unavailable.    History of polymyositis and is being treated with 40 mg prednisone daily.                Anesthesia Plan    ASA 3     general     (Patient understands anesthesia not responsible for dental damage.    On 40 mg prednisone for polymyositis, discussed possible need for stress dose of steroid.  Will start with 4 mg decadron and then give additional dose if needed postop )  intravenous induction     Anesthetic plan, risks, benefits, and alternatives have been provided, discussed and informed consent has been obtained with: patient.    Use of blood products discussed with patient .    Plan discussed with CRNA.    CODE STATUS:

## 2024-05-03 NOTE — ANESTHESIA POSTPROCEDURE EVALUATION
Patient: Nora Bone    Procedure Summary       Date: 05/03/24 Room / Location: [facility]  / [facility]    Anesthesia Start: 1016 Anesthesia Stop: 1149    Procedure: INTERSTIM STAGES 1 AND 2 LEAD AND GENERATOR PLACEMENT, full implant procedure Diagnosis:       Full incontinence of feces      (Full incontinence of feces [R15.9])    Surgeons: Roque Dowd MD Provider: Elias Huntley MD    Anesthesia Type: general ASA Status: 3            Anesthesia Type: general    Vitals  Vitals Value Taken Time   /79 05/03/24 1210   Temp 36.8 °C (98.2 °F) 05/03/24 1145   Pulse 66 05/03/24 1214   Resp 20 05/03/24 1210   SpO2 97 % 05/03/24 1214   Vitals shown include unfiled device data.        Post Anesthesia Care and Evaluation    Patient location during evaluation: bedside  Patient participation: complete - patient participated  Level of consciousness: awake  Pain management: adequate    Airway patency: patent  PONV Status: none  Cardiovascular status: acceptable and stable  Respiratory status: acceptable  Hydration status: acceptable    Comments: An Anesthesiologist personally participated in the most demanding procedures (including induction and emergence if applicable) in the anesthesia plan, monitored the course of anesthesia administration at frequent intervals and remained physically present and available for immediate diagnosis and treatment of emergencies.

## 2024-05-03 NOTE — OP NOTE
OP NOTE  INTERSTIM STAGE 1 LEAD PLACEMENT WITH ROLANDO  Procedure Report    Patient Name:  Nora Bnoe  YOB: 1955  [id number]    Date of Surgery:  5/3/2024     Indications: See last clinic note for indications, discussion of risk benefits and alternatives    Pre-op Diagnosis:   Full incontinence of feces [R15.9]       Post-Op Diagnosis Codes:     * Full incontinence of feces [R15.9]    Procedure/CPT® Codes:      Procedure(s): Complete InterStim implantation with incision and implantation of tined quadripolar lead electrode into the left S3 foramen with fluoroscopic guidance for needle placement, subcutaneous implantation of sacral nerve neurostimulator with electronic analysis and programming    Staff:  Surgeon(s):  Roque Dowd MD    Assistant: Amy Stone CSA    Anesthesia: General, Local    Estimated Blood Loss: 20 mL    Implants:    Implant Name Type Inv. Item Serial No.  Lot No. LRB No. Used Action   KT LD STIM TINED AXONICS W/2/STY STR/CRV - [id number] Implant KT LD STIM TINED AXONICS W/2/STY STR/CRV  [employer] [id number] N/A 1 Implanted   NEUROSTM SACRAL/NERV R20 RECHG - [id number] Implant NEUROSTM SACRAL/NERV R20 RECHG  [employer] SN [id number] N/A 1 Implanted       Specimen:          None      Findings: Excellent bellowing of the perineum noted to the left S3 foramen on 3 of the 4 electrodes.  Rechargeable battery placed on the patient's right side.    Complications: None    Description of Procedure:   After all questions were answered, consent was verified.  Patient brought to the operating room per stretcher placed in supine position arms out all extremities padded.  Bilateral lower extremity SCDs placed.  Anesthesia induced.  Patient rotated prone jackknife with all extremities padded.  Preoperative IV antibiotics administered.  Patient's buttocks taped apart.  Patient's lower back and buttocks prepped with  ChloraPrep.  We waited 3 minutes.  Draped in usual sterile fashion.  Ioban used to cover the anus.  Began the procedure using fluoroscopy to identify the landmarks of the S3 foramen.  I then placed a foramen needle to the patient's left S3 foramen using fluoroscopic guidance.  Bellowing of the perineum noted.  Foramen needle stylette removed and directional guide placed.  Stab incision made at the skin.  Lead introducer placed over the directional guide.  The internal obturator was removed.  The lead was placed until 3 electrodes were visible below the the sacral fluoroscopically.  Each lead electrode was tested.  Responses noted in 2 of the 4 electrodes.  The lead was removed and this was repeated on the patient's right side with a poor response to the right S3 foramen.  In a similar fashion to the steps above the lead was placed again on the patient's left side using fluoroscopic guidance with this time excellent response and 3 of the 4 electrodes with bellowing of the perineum.  The introducer sheath was retracted under continuous fluoroscopy deploying the lead tines in the [place] sacral tissue on the patient's left side under continuous fluoroscopy.  I then made a transverse incision in the right upper buttock creating a pocket for the battery.  I then tunneled the lead from the patient's left side to the right upper buttock pocket.  The lead was cleaned and placed into the header of the neurostimulator rechargeable battery.  It was secured with the screw.  It was then placed into the pocket with the dark portion of the rechargeable battery lateral with the excess leads placed under the battery.  I then closed the incisions with Vicryl Monocryl and skin glue.  I infiltrated with local.  At the end of the procedure all counts were correct.  I was present for the procedure.  Advance programming performed.    Assistant: Amy Stone CSA was responsible for performing the following activities: Retraction,  Closing, and Placing Dressing and their skilled assistance was necessary for the success of this case.    Roque Dowd MD     Date: 5/3/2024  Time: 11:42 EDT

## 2024-05-05 LAB
QT INTERVAL: 409 MS
QTC INTERVAL: 417 MS

## 2024-05-06 ENCOUNTER — TELEPHONE (OUTPATIENT)
Dept: SURGERY | Facility: CLINIC | Age: 69
End: 2024-05-06
Payer: MEDICARE

## 2024-05-06 DIAGNOSIS — M79.18 BUTTOCK PAIN: Primary | ICD-10-CM

## 2024-05-06 RX ORDER — TRAMADOL HYDROCHLORIDE 50 MG/1
50 TABLET ORAL EVERY 6 HOURS PRN
Qty: 8 TABLET | Refills: 0 | Status: SHIPPED | OUTPATIENT
Start: 2024-05-06 | End: 2025-05-06

## 2024-05-16 ENCOUNTER — OFFICE VISIT (OUTPATIENT)
Dept: SURGERY | Facility: CLINIC | Age: 69
End: 2024-05-16
Payer: MEDICARE

## 2024-05-16 VITALS
SYSTOLIC BLOOD PRESSURE: 163 MMHG | BODY MASS INDEX: 23.42 KG/M2 | WEIGHT: 132.2 LBS | HEIGHT: 63 IN | HEART RATE: 71 BPM | DIASTOLIC BLOOD PRESSURE: 83 MMHG | TEMPERATURE: 98.1 F

## 2024-05-16 DIAGNOSIS — R15.9 FULL INCONTINENCE OF FECES: Primary | ICD-10-CM

## 2024-05-16 PROCEDURE — 99024 POSTOP FOLLOW-UP VISIT: CPT | Performed by: SURGERY

## 2024-05-16 RX ORDER — LEVOTHYROXINE SODIUM 0.1 MG/1
TABLET ORAL
COMMUNITY
Start: 2024-05-02

## 2024-05-16 NOTE — LETTER
May 18, 2024       No Recipients    Patient: Nora Bone   YOB: 1955   Date of Visit: 5/16/2024     Dear Jarrod Simms MD:       Thank you for referring Nora Bone to me for evaluation. Below are the relevant portions of my assessment and plan of care.    If you have questions, please do not hesitate to call me. I look forward to following Nora along with you.         Sincerely,        Roque Dowd MD        CC:   No Recipients    oRque Dowd MD  05/18/24 1249  Sign when Signing Visit  General Surgery/Colorectal Surgery   Post -op Follow up Note    Patient Name:  Nora Bone  YOB: 1955  [id number]    Referring Provider: No ref. provider found    Patient Care Team:  Jarrod Simms MD as PCP - General (Family Medicine)    Chief complaint follow-up    Subjective.     History of present illness:    Status post full implant procedure for fecal incontinence on 5/3/2024    She comes in for follow-up.  She is very pleased with her progress.  Only 1-2 episodes of fecal incontinence since her surgery.  No fever.  She has some urinary urgency.  No fever.    History:  Past Medical History:   Diagnosis Date   • Anemia     NO CURRENT ISSUES GETS MONTHLY SHOTS   • Anxiety    • Asthma     INHALER, CONTROLLED   • Back pain     OCCASSIONAL   • Cervical cancer    • COPD (chronic obstructive pulmonary disease)     no O2 use   • Deep vein thrombosis     NO CURRENT ISSUES NO THINNERS   • Disease of thyroid gland    • Diverticulosis    • Fecal incontinence    • Hearing loss    • Hyperlipidemia    • Hypertension     NO MEDS   • Muscle weakness     detereration   • Peripheral vascular disease     WEARS SUPPORT HOSE/R VARICOSE VEINS   • Polymyositis     FOLLOWS BROKC, DAILY PREDNISONE       Past Surgical History:   Procedure Laterality Date   • CARPAL TUNNEL RELEASE Bilateral    • COLONOSCOPY W/ BIOPSIES     • DILATATION AND CURETTAGE     • INTERSTIM PLACEMENT N/A  2024    Procedure: basic peripheral nerve evaluation;  Surgeon: Roque Dowd MD;  [place] [facility] [place] [facility] [place];  Service: General;  Laterality: N/A;   • INTERSTIM PLACEMENT N/A 5/3/2024    Procedure: INTERSTIM STAGES 1 AND 2 LEAD AND GENERATOR PLACEMENT, full implant procedure;  Surgeon: Roque Dowd MD;  Location: [facility] [place] [facility] [place];  Service: General;  Laterality: N/A;   • MUSCLE BIOPSY Right 2024    Procedure: MUSCLE BIOPSY right thigh;  Surgeon: Roque Dowd MD;  [place]: [facility] [place] [facility] [place];  Service: General;  Laterality: Right;   • VAGINAL HYSTERECTOMY     • VEIN SURGERY Right     STRIPPING       Family History   Problem Relation Age of Onset   • Ovarian cancer Mother    • Heart disease Mother    • Thyroid disease Mother    • Epilepsy Father    • Breast cancer Sister    • Breast cancer Paternal Aunt    • Malig Hyperthermia Neg Hx        Social History     Tobacco Use   • Smoking status: Former     Current packs/day: 0.00     Average packs/day: 1 pack/day for 20.0 years (20.0 ttl pk-yrs)     Types: Cigarettes     Start date: 1995     Quit date: 2015     Years since quittin.3     Passive exposure: Never   • Smokeless tobacco: Never   Vaping Use   • Vaping status: Former   Substance Use Topics   • Alcohol use: Never   • Drug use: Yes     Types: Marijuana     Comment: OCCASSIONAL PRN PAIN/ANXIETY/INST PER ANESTHESIA PROTOCOL       MEDS:  Prior to Admission medications    Medication Sig Start Date End Date Taking? Authorizing Provider   acetaminophen-codeine (TYLENOL/CODEINE #3) 300-30 MG per tablet Take 1 tablet by mouth Every 4 (Four) Hours As Needed for Moderate Pain. 5/3/24 5/3/25 Yes Roque Dowd MD   albuterol sulfate  (90 Base) MCG/ACT inhaler Inhale 2 puffs Every 6 (Six) Hours As Needed for Wheezing. 3/11/24  Yes Jarrod Simms MD   amitriptyline (ELAVIL) 25 MG tablet TAKE 1 TABLET BY MOUTH EVERY NIGHT 24  Yes Jarrod Simms MD   B-D  "3CC LUER-LUCINA SYR 25GX1\" 25G X 1\" 3 ML misc use once monthly for b12 injection 11/18/22  Yes Jarrod Simms MD   Calcium Carb-Cholecalciferol (Oyster Shell Calcium w/D) 500-5 MG-MCG tablet TAKE 1 TABLET BY MOUTH TWICE DAILY  Patient taking differently: Take 1 tablet by mouth 2 (Two) Times a Day. LD 4/17/24 10/9/23  Yes Jarrod Simms MD   cyanocobalamin 1000 MCG/ML injection INJECT 1ML INTO THE APPROPRIATE MUSCLE AS DIRECTED BY PRESCRIBER EVERY 28 DAYS.  Patient taking differently: 1 mL Every 28 (Twenty-Eight) Days. 7/6/23  Yes Jarrod Simms MD   fluticasone (FLONASE) 50 MCG/ACT nasal spray 1 spray into the nostril(s) as directed by provider Daily.  Patient taking differently: 1 spray into the nostril(s) as directed by provider 2 (Two) Times a Day. 9/12/23  Yes Jarrod Simms MD   levothyroxine (SYNTHROID, LEVOTHROID) 100 MCG tablet Take 1 tablet by mouth Daily. CURRENT DOSE 5/2/24  Yes Edna Damon MD   levothyroxine (SYNTHROID, LEVOTHROID) 88 MCG tablet Take 1 tablet by mouth Daily. CURRENT DOSE 3/11/24  Yes Jarrod Simms MD   polyethylene glycol (MIRALAX) 17 g packet Take 17 g by mouth Daily. 2/26/24  Yes Roque Dowd MD   predniSONE (DELTASONE) 10 MG tablet Take 4 tablets by mouth Daily. 4/4/24  Yes Edna Damon MD   Probiotic Product ([employer] PO) Take  by mouth. LAST DOSE 4/17/24   Yes Edna Damon MD   Symbicort 160-4.5 MCG/ACT inhaler Inhale 2 puffs 2 (Two) Times a Day. 3/11/24  Yes Jarrod Simms MD   traMADol (Ultram) 50 MG tablet Take 1 tablet by mouth Every 6 (Six) Hours As Needed for Moderate Pain. 5/6/24 5/6/25 Yes Roque Dowd MD             No current facility-administered medications for this visit.       Allergies:  Hydrocodone-acetaminophen, Pollen extract, Molds & smuts, and Percocet [oxycodone-acetaminophen]    Objective    Vital Signs        Physical Exam:     Incisions clean dry intact " without evidence of infection    Results Review: I have reviewed the patient's labs and imaging    LABS/IMAGING:    Imaging Results (Last 72 Hours)       ** No results found for the last 72 hours. **             Lab Results (last 72 hours)       ** No results found for the last 72 hours. **               Result Review:     Assessment & Plan    * No active hospital problems. *     Status post full implant procedure for fecal incontinence on 5/3/2024    I reviewed the details of the surgery with the patient.  Okay to shower.  I encouraged her to continue to work with the [facility] helpline to adjust the settings.  She may try half tablet of Imodium as needed for any remaining accidents.  Titrate Imodium as needed.  Follow-up as needed.  All questions answered.  She agrees with the plan.  Thank you for the consult.          Roque Dowd MD  05/18/24 12:48 EDT

## 2024-05-18 NOTE — PROGRESS NOTES
General Surgery/Colorectal Surgery   Post -op Follow up Note    Patient Name:  Nora Bone  YOB: 1955  [id number]    Referring Provider: No ref. provider found    Patient Care Team:  Jarrod Simms MD as PCP - General (Family Medicine)    Chief complaint follow-up    Subjective .     History of present illness:    Status post full implant procedure for fecal incontinence on 5/3/2024    She comes in for follow-up.  She is very pleased with her progress.  Only 1-2 episodes of fecal incontinence since her surgery.  No fever.  She has some urinary urgency.  No fever.    History:  Past Medical History:   Diagnosis Date    Anemia     NO CURRENT ISSUES GETS MONTHLY SHOTS    Anxiety     Asthma     INHALER, CONTROLLED    Back pain     OCCASSIONAL    Cervical cancer     COPD (chronic obstructive pulmonary disease)     no O2 use    Deep vein thrombosis     NO CURRENT ISSUES NO THINNERS    Disease of thyroid gland     Diverticulosis     Fecal incontinence     Hearing loss     Hyperlipidemia     Hypertension     NO MEDS    Muscle weakness     detereration    Peripheral vascular disease     WEARS SUPPORT HOSE/R VARICOSE VEINS    Polymyositis     FOLLOWS BROCK, DAILY PREDNISONE       Past Surgical History:   Procedure Laterality Date    CARPAL TUNNEL RELEASE Bilateral     COLONOSCOPY W/ BIOPSIES      DILATATION AND CURETTAGE      INTERSTIM PLACEMENT N/A 4/19/2024    Procedure: basic peripheral nerve evaluation;  Surgeon: Roque Dowd MD;  Location: [facility] OR [facility] [place];  Service: General;  Laterality: N/A;    INTERSTIM PLACEMENT N/A 5/3/2024    Procedure: INTERSTIM STAGES 1 AND 2 LEAD AND GENERATOR PLACEMENT, full implant procedure;  Surgeon: Roque Dowd MD;  Location: [facility] OR [facility] [place];  Service: General;  Laterality: N/A;    MUSCLE BIOPSY Right 02/26/2024    Procedure: MUSCLE BIOPSY right thigh;  Surgeon: Roque Dowd MD;  Location: [facility] OR [facility] [place];  Service: General;  Laterality:  "Right;    VAGINAL HYSTERECTOMY      VEIN SURGERY Right     STRIPPING       Family History   Problem Relation Age of Onset    Ovarian cancer Mother     Heart disease Mother     Thyroid disease Mother     Epilepsy Father     Breast cancer Sister     Breast cancer Paternal Aunt     Malig Hyperthermia Neg Hx        Social History     Tobacco Use    Smoking status: Former     Current packs/day: 0.00     Average packs/day: 1 pack/day for 20.0 years (20.0 ttl pk-yrs)     Types: Cigarettes     Start date: 1995     Quit date: 2015     Years since quittin.3     Passive exposure: Never    Smokeless tobacco: Never   Vaping Use    Vaping status: Former   Substance Use Topics    Alcohol use: Never    Drug use: Yes     Types: Marijuana     Comment: OCCASSIONAL PRN PAIN/ANXIETY/INST PER ANESTHESIA PROTOCOL       MEDS:  Prior to Admission medications    Medication Sig Start Date End Date Taking? Authorizing Provider   acetaminophen-codeine (TYLENOL/CODEINE #3) 300-30 MG per tablet Take 1 tablet by mouth Every 4 (Four) Hours As Needed for Moderate Pain. 5/3/24 5/3/25 Yes Roque Dowd MD   albuterol sulfate  (90 Base) MCG/ACT inhaler Inhale 2 puffs Every 6 (Six) Hours As Needed for Wheezing. 3/11/24  Yes Jarrod Simms MD   amitriptyline (ELAVIL) 25 MG tablet TAKE 1 TABLET BY MOUTH EVERY NIGHT 24  Yes Jarrod Simms MD   B-D 3CC LUER-LUCINA SYR 25GX1\" 25G X 1\" 3 ML misc use once monthly for b12 injection 22  Yes Jarrod Simms MD   Calcium Carb-Cholecalciferol (Oyster Shell Calcium w/D) 500-5 MG-MCG tablet TAKE 1 TABLET BY MOUTH TWICE DAILY  Patient taking differently: Take 1 tablet by mouth 2 (Two) Times a Day. LD 4/17/24 10/9/23  Yes Jarrod Simms MD   cyanocobalamin 1000 MCG/ML injection INJECT 1ML INTO THE APPROPRIATE MUSCLE AS DIRECTED BY PRESCRIBER EVERY 28 DAYS.  Patient taking differently: 1 mL Every 28 (Twenty-Eight) Days. 23  Yes Jarrod Simms MD "   fluticasone (FLONASE) 50 MCG/ACT nasal spray 1 spray into the nostril(s) as directed by provider Daily.  Patient taking differently: 1 spray into the nostril(s) as directed by provider 2 (Two) Times a Day. 9/12/23  Yes Jarrod Simms MD   levothyroxine (SYNTHROID, LEVOTHROID) 100 MCG tablet Take 1 tablet by mouth Daily. CURRENT DOSE 5/2/24  Yes Edna Damon MD   levothyroxine (SYNTHROID, LEVOTHROID) 88 MCG tablet Take 1 tablet by mouth Daily. CURRENT DOSE 3/11/24  Yes Jarrod Simms MD   polyethylene glycol (MIRALAX) 17 g packet Take 17 g by mouth Daily. 2/26/24  Yes Roque Dowd MD   predniSONE (DELTASONE) 10 MG tablet Take 4 tablets by mouth Daily. 4/4/24  Yes Edna Damon MD   Probiotic Product (Doculynx PO) Take  by mouth. LAST DOSE 4/17/24   Yes Edna Damon MD   Symbicort 160-4.5 MCG/ACT inhaler Inhale 2 puffs 2 (Two) Times a Day. 3/11/24  Yes Jarrod Simms MD   traMADol (Ultram) 50 MG tablet Take 1 tablet by mouth Every 6 (Six) Hours As Needed for Moderate Pain. 5/6/24 5/6/25 Yes Roque Dowd MD             No current facility-administered medications for this visit.       Allergies:  Hydrocodone-acetaminophen, Pollen extract, Molds & smuts, and Percocet [oxycodone-acetaminophen]    Objective     Vital Signs        Physical Exam:     Incisions clean dry intact without evidence of infection    Results Review: I have reviewed the patient's labs and imaging    LABS/IMAGING:    Imaging Results (Last 72 Hours)       ** No results found for the last 72 hours. **             Lab Results (last 72 hours)       ** No results found for the last 72 hours. **               Result Review :     Assessment & Plan     * No active hospital problems. *     Status post full implant procedure for fecal incontinence on 5/3/2024    I reviewed the details of the surgery with the patient.  Okay to shower.  I encouraged her to continue to work with the  companies helpline to adjust the settings.  She may try half tablet of Imodium as needed for any remaining accidents.  Titrate Imodium as needed.  Follow-up as needed.  All questions answered.  She agrees with the plan.  Thank you for the consult.          Roque Dowd MD  05/18/24 12:48 EDT

## 2024-05-20 ENCOUNTER — TELEPHONE (OUTPATIENT)
Dept: FAMILY MEDICINE CLINIC | Facility: CLINIC | Age: 69
End: 2024-05-20

## 2024-05-20 ENCOUNTER — OFFICE VISIT (OUTPATIENT)
Dept: FAMILY MEDICINE CLINIC | Facility: CLINIC | Age: 69
End: 2024-05-20
Payer: MEDICARE

## 2024-05-20 VITALS
OXYGEN SATURATION: 94 % | SYSTOLIC BLOOD PRESSURE: 140 MMHG | TEMPERATURE: 98.3 F | HEART RATE: 92 BPM | BODY MASS INDEX: 23.35 KG/M2 | DIASTOLIC BLOOD PRESSURE: 80 MMHG | WEIGHT: 131.8 LBS

## 2024-05-20 DIAGNOSIS — E03.9 HYPOTHYROIDISM, UNSPECIFIED TYPE: Primary | ICD-10-CM

## 2024-05-20 DIAGNOSIS — M19.90 ARTHRITIS: ICD-10-CM

## 2024-05-20 LAB
T4 FREE SERPL-MCNC: 1.99 NG/DL (ref 0.93–1.7)
TSH SERPL DL<=0.05 MIU/L-ACNC: 0.16 UIU/ML (ref 0.27–4.2)

## 2024-05-20 PROCEDURE — 99214 OFFICE O/P EST MOD 30 MIN: CPT | Performed by: FAMILY MEDICINE

## 2024-05-20 PROCEDURE — G2211 COMPLEX E/M VISIT ADD ON: HCPCS | Performed by: FAMILY MEDICINE

## 2024-05-20 PROCEDURE — 36415 COLL VENOUS BLD VENIPUNCTURE: CPT | Performed by: FAMILY MEDICINE

## 2024-05-20 PROCEDURE — 1160F RVW MEDS BY RX/DR IN RCRD: CPT | Performed by: FAMILY MEDICINE

## 2024-05-20 PROCEDURE — 84439 ASSAY OF FREE THYROXINE: CPT | Performed by: FAMILY MEDICINE

## 2024-05-20 PROCEDURE — 1126F AMNT PAIN NOTED NONE PRSNT: CPT | Performed by: FAMILY MEDICINE

## 2024-05-20 PROCEDURE — 1159F MED LIST DOCD IN RCRD: CPT | Performed by: FAMILY MEDICINE

## 2024-05-20 PROCEDURE — 84443 ASSAY THYROID STIM HORMONE: CPT | Performed by: FAMILY MEDICINE

## 2024-05-20 RX ORDER — LIDOCAINE 50 MG/G
3 PATCH TOPICAL EVERY 24 HOURS
Qty: 90 PATCH | Refills: 1 | Status: SHIPPED | OUTPATIENT
Start: 2024-05-20

## 2024-05-20 RX ORDER — AMITRIPTYLINE HYDROCHLORIDE 25 MG/1
25 TABLET, FILM COATED ORAL NIGHTLY
Qty: 90 TABLET | Refills: 1 | Status: SHIPPED | OUTPATIENT
Start: 2024-05-20

## 2024-05-20 RX ORDER — DULOXETIN HYDROCHLORIDE 30 MG/1
30 CAPSULE, DELAYED RELEASE ORAL DAILY
Qty: 30 CAPSULE | Refills: 1 | Status: SHIPPED | OUTPATIENT
Start: 2024-05-20

## 2024-05-20 NOTE — TELEPHONE ENCOUNTER
Caller: Nora Bone    Relationship: Self    Best call back number: [phone]    What is the best time to reach you: ANYTIME    Who are you requesting to speak with (clinical staff, provider,  specific staff member): NURSE       What was the call regarding: PATIENT IS CALLING SAYING THE PHARMACY HAS TOLD HER THAT THE LIDOCAINE PATCHES WILL NEED A PRIOR AUTHORIZATION TO THEM.

## 2024-05-20 NOTE — PROGRESS NOTES
Chief Complaint  Hypothyroidism    Subjective          Nora Bone presents to [facility]  History of Present Illness  Pt has stimulator in place in back- surgery was success- wound healing well  Pt being treated by rheumatology for polymyositis  Pt is on synthroid 100mcg daily    Pt still having a lot of arthritis pain, especially in shoulder muscle and joints  Hypothyroidism  This is a chronic problem. The current episode started more than 1 year ago. The problem occurs intermittently. The problem has been gradually improving. Pertinent negatives include no abdominal pain, anorexia, arthralgias, change in bowel habit, chest pain, chills, congestion, coughing, diaphoresis, fatigue, fever, headaches, joint swelling, myalgias, nausea, neck pain, numbness, rash, sore throat, swollen glands, urinary symptoms, vertigo, visual change, vomiting or weakness. Nothing aggravates the symptoms. Treatments tried: synthroid. The treatment provided significant relief.       BMI is within normal parameters. No other follow-up for BMI required.       Objective   Allergies   Allergen Reactions    Hydrocodone-Acetaminophen Shortness Of Breath and Nausea And Vomiting     Also reports shaking       Pollen Extract Shortness Of Breath    Molds & Smuts Rash    Percocet [Oxycodone-Acetaminophen] Other (See Comments)     PT REQUEST PERCOCET BE ADDED TO ALLERGY LIST/PT CANNOT SLEEP WHILE TAKING THIS MEDICINE      Immunization History   Administered Date(s) Administered    ABRYSVO (RSV, 60+ or pregnant women 32-36 wks) 01/06/2024    COVID-19 (MODERNA) 1st,2nd,3rd Dose Monovalent 08/20/2021, 09/17/2021    Flu Vaccine Quad PF >36MO 02/07/2017, 09/27/2018, 10/15/2019, 01/12/2021    Fluzone (or Fluarix & Flulaval for VFC) >6mos 02/07/2017, 10/28/2021    Fluzone High-Dose 65+yrs 11/04/2022    Hepatitis A 03/13/2018, 09/13/2018    Pneumococcal Conjugate 20-Valent (PCV20) 09/12/2023    Pneumococcal Polysaccharide  (PPSV23) 10/28/2021    Tdap 2018     Past Medical History:   Diagnosis Date    Anemia     NO CURRENT ISSUES GETS MONTHLY SHOTS    Anxiety     Asthma     INHALER, CONTROLLED    Back pain     OCCASSIONAL    Cervical cancer     COPD (chronic obstructive pulmonary disease)     no O2 use    Deep vein thrombosis     NO CURRENT ISSUES NO THINNERS    Disease of thyroid gland     Diverticulosis     Fecal incontinence     Hearing loss     Hyperlipidemia     Hypertension     NO MEDS    Muscle weakness     detereration    Peripheral vascular disease     WEARS SUPPORT HOSE/R VARICOSE VEINS    Polymyositis     FOLLOWS BROCK, DAILY PREDNISONE      Past Surgical History:   Procedure Laterality Date    CARPAL TUNNEL RELEASE Bilateral     COLONOSCOPY W/ BIOPSIES      DILATATION AND CURETTAGE      INTERSTIM PLACEMENT N/A 2024    Procedure: basic peripheral nerve evaluation;  Surgeon: Roque Dowd MD;  Location: [facility] OR [facility] [place];  Service: General;  Laterality: N/A;    INTERSTIM PLACEMENT N/A 5/3/2024    Procedure: INTERSTIM STAGES 1 AND 2 LEAD AND GENERATOR PLACEMENT, full implant procedure;  Surgeon: Roque Dowd MD;  Location: [facility] OR [facility] [place];  Service: General;  Laterality: N/A;    MUSCLE BIOPSY Right 2024    Procedure: MUSCLE BIOPSY right thigh;  Surgeon: Roque Dowd MD;  Location: [facility];  Service: General;  Laterality: Right;    VAGINAL HYSTERECTOMY      VEIN SURGERY Right     STRIPPING      Social History     Socioeconomic History    Marital status: Single   Tobacco Use    Smoking status: Former     Current packs/day: 0.00     Average packs/day: 1 pack/day for 20.0 years (20.0 ttl pk-yrs)     Types: Cigarettes     Start date: 1995     Quit date: 2015     Years since quittin.3     Passive exposure: Never    Smokeless tobacco: Never   Vaping Use    Vaping status: Former   Substance and Sexual Activity    Alcohol use: Never    Drug use: Yes     Types: Marijuana      "Comment: OCCASSIONAL PRN PAIN/ANXIETY/INST PER ANESTHESIA PROTOCOL    Sexual activity: Defer     Birth control/protection: Surgical        Current Outpatient Medications:     albuterol sulfate  (90 Base) MCG/ACT inhaler, Inhale 2 puffs Every 6 (Six) Hours As Needed for Wheezing., Disp: 18 g, Rfl: 3    amitriptyline (ELAVIL) 25 MG tablet, Take 1 tablet by mouth Every Night., Disp: 90 tablet, Rfl: 1    B-D 3CC LUER-LUCINA SYR 25GX1\" 25G X 1\" 3 ML misc, use once monthly for b12 injection, Disp: 3 each, Rfl: 0    Calcium Carb-Cholecalciferol (Oyster Shell Calcium w/D) 500-5 MG-MCG tablet, TAKE 1 TABLET BY MOUTH TWICE DAILY, Disp: 180 tablet, Rfl: 0    cyanocobalamin 1000 MCG/ML injection, INJECT 1ML INTO THE APPROPRIATE MUSCLE AS DIRECTED BY PRESCRIBER EVERY 28 DAYS. (Patient taking differently: 1 mL Every 28 (Twenty-Eight) Days.), Disp: 3 mL, Rfl: 3    fluticasone (FLONASE) 50 MCG/ACT nasal spray, 1 spray into the nostril(s) as directed by provider Daily. (Patient taking differently: 1 spray into the nostril(s) as directed by provider 2 (Two) Times a Day.), Disp: 48 g, Rfl: 5    levothyroxine (SYNTHROID, LEVOTHROID) 100 MCG tablet, Take 1 tablet by mouth Daily. CURRENT DOSE, Disp: , Rfl:     polyethylene glycol (MIRALAX) 17 g packet, Take 17 g by mouth Daily., Disp: 3 packet, Rfl: 0    predniSONE (DELTASONE) 10 MG tablet, Take 4 tablets by mouth Daily., Disp: , Rfl:     Probiotic Product (ZOGOtennis PO), Take  by mouth. LAST DOSE 4/17/24, Disp: , Rfl:     traMADol (Ultram) 50 MG tablet, Take 1 tablet by mouth Every 6 (Six) Hours As Needed for Moderate Pain., Disp: 8 tablet, Rfl: 0    DULoxetine (Cymbalta) 30 MG capsule, Take 1 capsule by mouth Daily., Disp: 30 capsule, Rfl: 1    lidocaine (Lidoderm) 5 %, Place 3 patches on the skin as directed by provider Daily. Remove & Discard patch within 12 hours or as directed by MD, Disp: 90 patch, Rfl: 1   Family History   Problem Relation Age of Onset    Ovarian " cancer Mother     Heart disease Mother     Thyroid disease Mother     Epilepsy Father     Breast cancer Sister     Breast cancer Paternal Aunt     Malig Hyperthermia Neg Hx           Vital Signs:   Vitals:    05/20/24 1400   BP: 140/80   Pulse: 92   Temp: 98.3 °F (36.8 °C)   SpO2: 94%   Weight: 59.8 kg (131 lb 12.8 oz)       Review of Systems   Constitutional:  Negative for chills, diaphoresis, fatigue and fever.   HENT:  Negative for congestion and sore throat.    Eyes:  Negative for visual disturbance.   Respiratory:  Negative for cough, chest tightness, shortness of breath and wheezing.    Cardiovascular:  Negative for chest pain, palpitations and leg swelling.   Gastrointestinal:  Negative for abdominal pain, anorexia, change in bowel habit, diarrhea, nausea and vomiting.   Musculoskeletal:  Negative for arthralgias, joint swelling, myalgias and neck pain.   Skin:  Negative for rash.   Neurological:  Negative for dizziness, vertigo, weakness, light-headedness, numbness and headaches.   Psychiatric/Behavioral:  Negative for decreased concentration, dysphoric mood, sleep disturbance and suicidal ideas.       Physical Exam  Vitals reviewed. Exam conducted with a chaperone present.   Constitutional:       Appearance: Normal appearance. She is well-developed.   HENT:      Head: Normocephalic and atraumatic.      Right Ear: External ear normal.      Left Ear: External ear normal.      Mouth/Throat:      Pharynx: No oropharyngeal exudate.   Eyes:      Conjunctiva/sclera: Conjunctivae normal.      Pupils: Pupils are equal, round, and reactive to light.   Cardiovascular:      Rate and Rhythm: Normal rate and regular rhythm.      Pulses: Normal pulses.      Heart sounds: Normal heart sounds. No murmur heard.     No friction rub. No gallop.   Pulmonary:      Effort: Pulmonary effort is normal.      Breath sounds: Normal breath sounds. No wheezing or rhonchi.   Abdominal:      General: Abdomen is flat. Bowel sounds are  normal. There is no distension.      Palpations: Abdomen is soft. There is no mass.      Tenderness: There is no abdominal tenderness. There is no guarding or rebound.      Hernia: No hernia is present.   Musculoskeletal:         General: Normal range of motion.      Cervical back: Normal range of motion and neck supple.   Skin:     General: Skin is warm and dry.      Capillary Refill: Capillary refill takes less than 2 seconds.      Comments: Lower back wounds healing- no signs of infection, no redness, warmth, swelling, bruising, or tenderness, or discharge.   Neurological:      General: No focal deficit present.      Mental Status: She is alert and oriented to person, place, and time.      Cranial Nerves: No cranial nerve deficit.   Psychiatric:         Mood and Affect: Mood and affect normal.         Behavior: Behavior normal.         Thought Content: Thought content normal.         Judgment: Judgment normal.        Result Review :   The following data was reviewed by: Jarrod Simms MD on 05/20/2024:  CMP          9/12/2023    11:45 3/11/2024    11:39   CMP   Glucose 83  88    BUN 13  14    Creatinine 0.88  0.93    EGFR 72.6  67.5    Sodium 143  142    Potassium 4.5  4.9    Chloride 106  106    Calcium 9.5  9.5    Total Protein 7.5  7.3    Albumin 4.7  4.6    Globulin 2.8  2.7    Total Bilirubin 0.3  0.3    Alkaline Phosphatase 92  103    AST (SGOT) 26  27    ALT (SGPT) 27  23    Albumin/Globulin Ratio 1.7  1.7    BUN/Creatinine Ratio 14.8  15.1    Anion Gap 11.3  9.0      CBC          3/11/2024    11:39   CBC   WBC 8.65    RBC 5.19    Hemoglobin 15.4    Hematocrit 45.4    MCV 87.5    MCH 29.7    MCHC 33.9    RDW 13.7    Platelets 260      Lipid Panel          9/12/2023    11:45 3/11/2024    11:39   Lipid Panel   Total Cholesterol 235  245    Triglycerides 138  174    HDL Cholesterol 59  54    VLDL Cholesterol 25  32    LDL Cholesterol  151  159    LDL/HDL Ratio 2.52  2.89      TSH          9/12/2023    11:45  11/13/2023    11:33 3/11/2024    11:39   TSH   TSH 0.035  0.884  0.250                Assessment and Plan    Diagnoses and all orders for this visit:    1. Hypothyroidism, unspecified type (Primary)  -     Cancel: TSH+Free T4  -     TSH+Free T4    2. Arthritis  -     lidocaine (Lidoderm) 5 %; Place 3 patches on the skin as directed by provider Daily. Remove & Discard patch within 12 hours or as directed by MD  Dispense: 90 patch; Refill: 1  -     amitriptyline (ELAVIL) 25 MG tablet; Take 1 tablet by mouth Every Night.  Dispense: 90 tablet; Refill: 1  -     DULoxetine (Cymbalta) 30 MG capsule; Take 1 capsule by mouth Daily.  Dispense: 30 capsule; Refill: 1            Follow Up   Return in about 1 month (around 6/20/2024) for Recheck.  Patient was given instructions and counseling regarding her condition or for health maintenance advice. Please see specific information pulled into the AVS if appropriate.

## 2024-05-23 DIAGNOSIS — E03.9 HYPOTHYROIDISM, UNSPECIFIED TYPE: Primary | ICD-10-CM

## 2024-05-23 RX ORDER — LEVOTHYROXINE SODIUM 88 UG/1
88 TABLET ORAL DAILY
Qty: 30 TABLET | Refills: 1 | Status: SHIPPED | OUTPATIENT
Start: 2024-05-23

## 2024-05-23 NOTE — PROGRESS NOTES
Call pt: labs show hyperthyroidism, so I decreased dose of synthroid to 88mcg.  Recheck labs in 1 month.

## 2024-06-09 DIAGNOSIS — J45.40 MODERATE PERSISTENT ASTHMA WITHOUT COMPLICATION: ICD-10-CM

## 2024-06-10 RX ORDER — ALBUTEROL SULFATE 90 UG/1
2 AEROSOL, METERED RESPIRATORY (INHALATION) EVERY 6 HOURS PRN
Qty: 18 G | Refills: 3 | Status: SHIPPED | OUTPATIENT
Start: 2024-06-10

## 2024-06-25 ENCOUNTER — OFFICE VISIT (OUTPATIENT)
Dept: FAMILY MEDICINE CLINIC | Facility: CLINIC | Age: 69
End: 2024-06-25
Payer: MEDICARE

## 2024-06-25 VITALS
WEIGHT: 133.5 LBS | DIASTOLIC BLOOD PRESSURE: 80 MMHG | BODY MASS INDEX: 23.65 KG/M2 | OXYGEN SATURATION: 99 % | TEMPERATURE: 98.3 F | SYSTOLIC BLOOD PRESSURE: 130 MMHG | HEART RATE: 75 BPM

## 2024-06-25 DIAGNOSIS — E03.9 HYPOTHYROIDISM, UNSPECIFIED TYPE: ICD-10-CM

## 2024-06-25 DIAGNOSIS — J30.89 NON-SEASONAL ALLERGIC RHINITIS, UNSPECIFIED TRIGGER: Primary | ICD-10-CM

## 2024-06-25 DIAGNOSIS — E53.8 VITAMIN B12 DEFICIENCY: ICD-10-CM

## 2024-06-25 DIAGNOSIS — K59.00 CONSTIPATION, UNSPECIFIED CONSTIPATION TYPE: ICD-10-CM

## 2024-06-25 DIAGNOSIS — M85.80 OSTEOPENIA, UNSPECIFIED LOCATION: ICD-10-CM

## 2024-06-25 LAB
T4 FREE SERPL-MCNC: 1.15 NG/DL (ref 0.92–1.68)
TSH SERPL DL<=0.05 MIU/L-ACNC: 1.73 UIU/ML (ref 0.27–4.2)

## 2024-06-25 PROCEDURE — 99214 OFFICE O/P EST MOD 30 MIN: CPT | Performed by: FAMILY MEDICINE

## 2024-06-25 PROCEDURE — G2211 COMPLEX E/M VISIT ADD ON: HCPCS | Performed by: FAMILY MEDICINE

## 2024-06-25 PROCEDURE — 1126F AMNT PAIN NOTED NONE PRSNT: CPT | Performed by: FAMILY MEDICINE

## 2024-06-25 PROCEDURE — 1159F MED LIST DOCD IN RCRD: CPT | Performed by: FAMILY MEDICINE

## 2024-06-25 PROCEDURE — 84439 ASSAY OF FREE THYROXINE: CPT | Performed by: FAMILY MEDICINE

## 2024-06-25 PROCEDURE — 1160F RVW MEDS BY RX/DR IN RCRD: CPT | Performed by: FAMILY MEDICINE

## 2024-06-25 PROCEDURE — 84443 ASSAY THYROID STIM HORMONE: CPT | Performed by: FAMILY MEDICINE

## 2024-06-25 PROCEDURE — 36415 COLL VENOUS BLD VENIPUNCTURE: CPT | Performed by: FAMILY MEDICINE

## 2024-06-25 RX ORDER — CALCIUM CARBONATE/VITAMIN D3 500MG-5MCG
1 TABLET ORAL 2 TIMES DAILY
Qty: 180 TABLET | Refills: 1 | Status: SHIPPED | OUTPATIENT
Start: 2024-06-25

## 2024-06-25 RX ORDER — LEVOCETIRIZINE DIHYDROCHLORIDE 5 MG/1
5 TABLET, FILM COATED ORAL EVERY EVENING
Qty: 90 TABLET | Refills: 1 | Status: SHIPPED | OUTPATIENT
Start: 2024-06-25

## 2024-06-25 RX ORDER — BUDESONIDE AND FORMOTEROL FUMARATE DIHYDRATE 160; 4.5 UG/1; UG/1
2 AEROSOL RESPIRATORY (INHALATION) 2 TIMES DAILY
COMMUNITY
Start: 2024-05-30

## 2024-06-25 RX ORDER — CYANOCOBALAMIN 1000 UG/ML
1000 INJECTION, SOLUTION INTRAMUSCULAR; SUBCUTANEOUS
Qty: 3 ML | Refills: 3 | Status: SHIPPED | OUTPATIENT
Start: 2024-06-25

## 2024-06-25 RX ORDER — ALUMINUM ZIRCONIUM OCTACHLOROHYDREX GLY 16 G/100G
4 GEL TOPICAL DAILY
Qty: 90 CAPSULE | Refills: 1 | Status: SHIPPED | OUTPATIENT
Start: 2024-06-25

## 2024-06-25 NOTE — PROGRESS NOTES
Venipuncture Blood Specimen Collection  Venipuncture performed in left arm by Fior Goldberg with good hemostasis. Patient tolerated the procedure well without complications.   06/25/24   Fior Goldberg

## 2024-06-25 NOTE — PROGRESS NOTES
Chief Complaint  Hyperthyroidism, Abnormal Lab, and Anxiety    Subjective          Nora Bone presents to [facility]  History of Present Illness  Pt has allergic rhinitis uncontrolled- will start xyzal  Hypothyroidism  This is a chronic problem. The current episode started more than 1 year ago. The problem occurs intermittently. The problem has been gradually improving. Pertinent negatives include no abdominal pain, anorexia, arthralgias, change in bowel habit, chest pain, chills, congestion, coughing, diaphoresis, fatigue, fever, headaches, joint swelling, myalgias, nausea, neck pain, numbness, rash, sore throat, swollen glands, urinary symptoms, vertigo, visual change, vomiting or weakness. Nothing aggravates the symptoms. Treatments tried: synthroid. The treatment provided significant relief.       BMI is within normal parameters. No other follow-up for BMI required.       Objective   Allergies   Allergen Reactions    Hydrocodone-Acetaminophen Shortness Of Breath and Nausea And Vomiting     Also reports shaking       Pollen Extract Shortness Of Breath    Molds & Smuts Rash    Percocet [Oxycodone-Acetaminophen] Other (See Comments)     PT REQUEST PERCOCET BE ADDED TO ALLERGY LIST/PT CANNOT SLEEP WHILE TAKING THIS MEDICINE      Immunization History   Administered Date(s) Administered    ABRYSVO (RSV, 60+ or pregnant women 32-36 wks) 01/06/2024    COVID-19 (MODERNA) 1st,2nd,3rd Dose Monovalent 08/20/2021, 09/17/2021    Flu Vaccine Quad PF >36MO 02/07/2017, 09/27/2018, 10/15/2019, 01/12/2021    Fluzone (or Fluarix & Flulaval for VFC) >6mos 02/07/2017, 10/28/2021    Fluzone High-Dose 65+yrs 11/04/2022    Hepatitis A 03/13/2018, 09/13/2018    Pneumococcal Conjugate 20-Valent (PCV20) 09/12/2023    Pneumococcal Polysaccharide (PPSV23) 10/28/2021    Tdap 09/27/2018     Past Medical History:   Diagnosis Date    Anemia     NO CURRENT ISSUES GETS MONTHLY SHOTS    Anxiety     Asthma      INHALER, CONTROLLED    Back pain     OCCASSIONAL    Cervical cancer     COPD (chronic obstructive pulmonary disease)     no O2 use    Deep vein thrombosis     NO CURRENT ISSUES NO THINNERS    Disease of thyroid gland     Diverticulosis     Fecal incontinence     Hearing loss     Hyperlipidemia     Hypertension     NO MEDS    Muscle weakness     detereration    Peripheral vascular disease     WEARS SUPPORT HOSE/R VARICOSE VEINS    Polymyositis     FOLLOWS BROCK, DAILY PREDNISONE      Past Surgical History:   Procedure Laterality Date    CARPAL TUNNEL RELEASE Bilateral     COLONOSCOPY W/ BIOPSIES      DILATATION AND CURETTAGE      INTERSTIM PLACEMENT N/A 2024    Procedure: basic peripheral nerve evaluation;  Surgeon: Roque Dowd MD;  Location: [facility];  Service: General;  Laterality: N/A;    INTERSTIM PLACEMENT N/A 5/3/2024    Procedure: INTERSTIM STAGES 1 AND 2 LEAD AND GENERATOR PLACEMENT, full implant procedure;  Surgeon: Roque Dowd MD;  Location: [facility];  Service: General;  Laterality: N/A;    MUSCLE BIOPSY Right 2024    Procedure: MUSCLE BIOPSY right thigh;  Surgeon: Roque Dowd MD;  Location: [facility];  Service: General;  Laterality: Right;    VAGINAL HYSTERECTOMY      VEIN SURGERY Right     STRIPPING      Social History     Socioeconomic History    Marital status: Single   Tobacco Use    Smoking status: Former     Current packs/day: 0.00     Average packs/day: 1 pack/day for 20.0 years (20.0 ttl pk-yrs)     Types: Cigarettes     Start date: 1995     Quit date: 2015     Years since quittin.4     Passive exposure: Never    Smokeless tobacco: Never   Vaping Use    Vaping status: Former   Substance and Sexual Activity    Alcohol use: Never    Drug use: Yes     Types: Marijuana     Comment: OCCASSIONAL PRN PAIN/ANXIETY/INST PER ANESTHESIA PROTOCOL    Sexual activity: Defer     Birth control/protection: Surgical        Current Outpatient  "Medications:     albuterol sulfate  (90 Base) MCG/ACT inhaler, Inhale 2 puffs Every 6 (Six) Hours As Needed for Wheezing., Disp: 18 g, Rfl: 3    amitriptyline (ELAVIL) 25 MG tablet, Take 1 tablet by mouth Every Night., Disp: 90 tablet, Rfl: 1    B-D 3CC LUER-LUCINA SYR 25GX1\" 25G X 1\" 3 ML misc, use once monthly for b12 injection, Disp: 3 each, Rfl: 0    budesonide-formoterol (SYMBICORT) 160-4.5 MCG/ACT inhaler, Inhale 2 puffs 2 (Two) Times a Day., Disp: , Rfl:     cyanocobalamin 1000 MCG/ML injection, Inject 1 mL into the appropriate muscle as directed by prescriber Every 28 (Twenty-Eight) Days., Disp: 3 mL, Rfl: 3    DULoxetine (Cymbalta) 30 MG capsule, Take 1 capsule by mouth Daily., Disp: 30 capsule, Rfl: 1    fluticasone (FLONASE) 50 MCG/ACT nasal spray, 1 spray into the nostril(s) as directed by provider Daily. (Patient taking differently: 1 spray into the nostril(s) as directed by provider 2 (Two) Times a Day.), Disp: 48 g, Rfl: 5    levothyroxine (SYNTHROID, LEVOTHROID) 88 MCG tablet, Take 1 tablet by mouth Daily., Disp: 30 tablet, Rfl: 1    lidocaine (Lidoderm) 5 %, Place 3 patches on the skin as directed by provider Daily. Remove & Discard patch within 12 hours or as directed by MD, Disp: 90 patch, Rfl: 1    polyethylene glycol (MIRALAX) 17 g packet, Take 17 g by mouth Daily., Disp: 3 packet, Rfl: 0    predniSONE (DELTASONE) 10 MG tablet, Take 4 tablets by mouth Daily., Disp: , Rfl:     traMADol (Ultram) 50 MG tablet, Take 1 tablet by mouth Every 6 (Six) Hours As Needed for Moderate Pain., Disp: 8 tablet, Rfl: 0    Calcium Carb-Cholecalciferol (Calcium 500 + D) 500-5 MG-MCG tablet per tablet, Take 1 tablet by mouth 2 (Two) Times a Day., Disp: 180 tablet, Rfl: 1    levocetirizine (XYZAL) 5 MG tablet, Take 1 tablet by mouth Every Evening., Disp: 90 tablet, Rfl: 1    Probiotic Product (Align) 4 MG capsule, Take 4 mg by mouth Daily., Disp: 90 capsule, Rfl: 1   Family History   Problem Relation Age of Onset "    Ovarian cancer Mother     Heart disease Mother     Thyroid disease Mother     Epilepsy Father     Breast cancer Sister     Breast cancer Paternal Aunt     Malig Hyperthermia Neg Hx           Vital Signs:   Vitals:    06/25/24 1302   BP: 130/80   Pulse: 75   Temp: 98.3 °F (36.8 °C)   SpO2: 99%   Weight: 60.6 kg (133 lb 8 oz)       Review of Systems   Constitutional:  Negative for chills, diaphoresis, fatigue and fever.   HENT:  Negative for congestion and sore throat.    Eyes:  Negative for visual disturbance.   Respiratory:  Negative for cough, chest tightness, shortness of breath and wheezing.    Cardiovascular:  Negative for chest pain, palpitations and leg swelling.   Gastrointestinal:  Negative for abdominal pain, anorexia, change in bowel habit, diarrhea, nausea and vomiting.   Musculoskeletal:  Negative for arthralgias, joint swelling, myalgias and neck pain.   Skin:  Negative for rash.   Neurological:  Negative for dizziness, vertigo, weakness, light-headedness, numbness and headaches.      Physical Exam  Vitals reviewed.   Constitutional:       Appearance: Normal appearance. She is well-developed.   HENT:      Head: Normocephalic and atraumatic.      Right Ear: External ear normal.      Left Ear: External ear normal.      Mouth/Throat:      Pharynx: No oropharyngeal exudate.   Eyes:      Conjunctiva/sclera: Conjunctivae normal.      Pupils: Pupils are equal, round, and reactive to light.   Cardiovascular:      Rate and Rhythm: Normal rate and regular rhythm.      Pulses: Normal pulses.      Heart sounds: Normal heart sounds. No murmur heard.     No friction rub. No gallop.   Pulmonary:      Effort: Pulmonary effort is normal.      Breath sounds: Normal breath sounds. No wheezing or rhonchi.   Abdominal:      General: Abdomen is flat. Bowel sounds are normal. There is no distension.      Palpations: Abdomen is soft. There is no mass.      Tenderness: There is no abdominal tenderness. There is no guarding  or rebound.      Hernia: No hernia is present.   Musculoskeletal:         General: Normal range of motion.   Skin:     General: Skin is warm and dry.      Capillary Refill: Capillary refill takes less than 2 seconds.   Neurological:      General: No focal deficit present.      Mental Status: She is alert and oriented to person, place, and time.      Cranial Nerves: No cranial nerve deficit.   Psychiatric:         Mood and Affect: Mood and affect normal.         Behavior: Behavior normal.         Thought Content: Thought content normal.         Judgment: Judgment normal.        Result Review :   The following data was reviewed by: Jarrod Simms MD on 06/25/2024:  CMP          9/12/2023    11:45 3/11/2024    11:39   CMP   Glucose 83  88    BUN 13  14    Creatinine 0.88  0.93    EGFR 72.6  67.5    Sodium 143  142    Potassium 4.5  4.9    Chloride 106  106    Calcium 9.5  9.5    Total Protein 7.5  7.3    Albumin 4.7  4.6    Globulin 2.8  2.7    Total Bilirubin 0.3  0.3    Alkaline Phosphatase 92  103    AST (SGOT) 26  27    ALT (SGPT) 27  23    Albumin/Globulin Ratio 1.7  1.7    BUN/Creatinine Ratio 14.8  15.1    Anion Gap 11.3  9.0      CBC          3/11/2024    11:39   CBC   WBC 8.65    RBC 5.19    Hemoglobin 15.4    Hematocrit 45.4    MCV 87.5    MCH 29.7    MCHC 33.9    RDW 13.7    Platelets 260      Lipid Panel          9/12/2023    11:45 3/11/2024    11:39   Lipid Panel   Total Cholesterol 235  245    Triglycerides 138  174    HDL Cholesterol 59  54    VLDL Cholesterol 25  32    LDL Cholesterol  151  159    LDL/HDL Ratio 2.52  2.89      TSH          11/13/2023    11:33 3/11/2024    11:39 5/20/2024    14:47   TSH   TSH 0.884  0.250  0.162                Assessment and Plan    Diagnoses and all orders for this visit:    1. Non-seasonal allergic rhinitis, unspecified trigger (Primary)  -     levocetirizine (XYZAL) 5 MG tablet; Take 1 tablet by mouth Every Evening.  Dispense: 90 tablet; Refill: 1    2. Osteopenia,  unspecified location  -     Calcium Carb-Cholecalciferol (Calcium 500 + D) 500-5 MG-MCG tablet per tablet; Take 1 tablet by mouth 2 (Two) Times a Day.  Dispense: 180 tablet; Refill: 1    3. Vitamin B12 deficiency  -     cyanocobalamin 1000 MCG/ML injection; Inject 1 mL into the appropriate muscle as directed by prescriber Every 28 (Twenty-Eight) Days.  Dispense: 3 mL; Refill: 3    4. Hypothyroidism, unspecified type    5. Constipation, unspecified constipation type  -     Probiotic Product (Align) 4 MG capsule; Take 4 mg by mouth Daily.  Dispense: 90 capsule; Refill: 1            Follow Up   Return in about 3 months (around 9/25/2024) for Recheck, Medicare Wellness.  Patient was given instructions and counseling regarding her condition or for health maintenance advice. Please see specific information pulled into the AVS if appropriate.

## 2024-06-26 DIAGNOSIS — I87.2 VENOUS (PERIPHERAL) INSUFFICIENCY: ICD-10-CM

## 2024-06-26 DIAGNOSIS — I83.893 VARICOSE VEINS OF BILATERAL LOWER EXTREMITIES WITH OTHER COMPLICATIONS: ICD-10-CM

## 2024-06-26 RX ORDER — DIOSMIN COMPLEX NO.1 630 MG
1 TABLET ORAL DAILY
Qty: 30 EACH | Refills: 11 | Status: SHIPPED | OUTPATIENT
Start: 2024-06-26 | End: 2025-06-21

## 2024-07-10 ENCOUNTER — TELEPHONE (OUTPATIENT)
Dept: FAMILY MEDICINE CLINIC | Facility: CLINIC | Age: 69
End: 2024-07-10
Payer: MEDICARE

## 2024-07-10 NOTE — TELEPHONE ENCOUNTER
Caller: Nora Bone    Relationship: Self    Best call back number: [phone]    What is the best time to reach you: ANYTIME    Who are you requesting to speak with (clinical staff, provider,  specific staff member): NURSE OR DR. SHELBI FAIR       What was the call regarding: PATIENT IS CALLING STATING THAT SHE HAS NOT BEEN FEELING WELL FOR THE PAST THREE DAYS AND WANTED TO SEE IF SOMETHING COULD BE CALLED IN OR WOULD SHE NEED TO COME IN.  SHE HAS BEEN SICK AT HER STOMACH, HEADACHE AND RUNNING A LOW GRADE FEVER.

## 2024-07-11 ENCOUNTER — HOSPITAL ENCOUNTER (OUTPATIENT)
Dept: CT IMAGING | Facility: HOSPITAL | Age: 69
Discharge: HOME | End: 2024-07-11
Admitting: FAMILY MEDICINE
Payer: MEDICARE

## 2024-07-11 ENCOUNTER — OFFICE VISIT (OUTPATIENT)
Dept: FAMILY MEDICINE CLINIC | Facility: CLINIC | Age: 69
End: 2024-07-11
Payer: MEDICARE

## 2024-07-11 VITALS
TEMPERATURE: 98.1 F | OXYGEN SATURATION: 98 % | DIASTOLIC BLOOD PRESSURE: 70 MMHG | SYSTOLIC BLOOD PRESSURE: 118 MMHG | BODY MASS INDEX: 23.22 KG/M2 | WEIGHT: 131.1 LBS | HEART RATE: 84 BPM

## 2024-07-11 DIAGNOSIS — R50.9 FEVER, UNSPECIFIED FEVER CAUSE: ICD-10-CM

## 2024-07-11 DIAGNOSIS — R11.2 NAUSEA AND VOMITING, UNSPECIFIED VOMITING TYPE: ICD-10-CM

## 2024-07-11 DIAGNOSIS — R50.9 FEVER, UNSPECIFIED FEVER CAUSE: Primary | ICD-10-CM

## 2024-07-11 DIAGNOSIS — R10.30 LOWER ABDOMINAL PAIN: ICD-10-CM

## 2024-07-11 DIAGNOSIS — R10.13 EPIGASTRIC PAIN: ICD-10-CM

## 2024-07-11 LAB
ALBUMIN SERPL-MCNC: 4.1 G/DL (ref 3.5–5.2)
ALBUMIN/GLOB SERPL: 1.7 G/DL
ALP SERPL-CCNC: 123 U/L (ref 39–117)
ALT SERPL W P-5'-P-CCNC: 147 U/L (ref 1–33)
ANION GAP SERPL CALCULATED.3IONS-SCNC: 9 MMOL/L (ref 5–15)
AST SERPL-CCNC: 110 U/L (ref 1–32)
BASOPHILS # BLD AUTO: 0.09 10*3/MM3 (ref 0–0.2)
BASOPHILS NFR BLD AUTO: 0.8 % (ref 0–1.5)
BILIRUB SERPL-MCNC: 0.7 MG/DL (ref 0–1.2)
BUN SERPL-MCNC: 20 MG/DL (ref 8–23)
BUN/CREAT SERPL: 18.7 (ref 7–25)
CALCIUM SPEC-SCNC: 8.9 MG/DL (ref 8.6–10.5)
CHLORIDE SERPL-SCNC: 101 MMOL/L (ref 98–107)
CO2 SERPL-SCNC: 28 MMOL/L (ref 22–29)
CREAT BLDA-MCNC: 1.2 MG/DL (ref 0.6–1.3)
CREAT SERPL-MCNC: 1.07 MG/DL (ref 0.57–1)
DEPRECATED RDW RBC AUTO: 48.7 FL (ref 37–54)
EGFRCR SERPLBLD CKD-EPI 2021: 49.7 ML/MIN/1.73
EGFRCR SERPLBLD CKD-EPI 2021: 57 ML/MIN/1.73
EOSINOPHIL # BLD AUTO: 0.37 10*3/MM3 (ref 0–0.4)
EOSINOPHIL NFR BLD AUTO: 3.3 % (ref 0.3–6.2)
ERYTHROCYTE [DISTWIDTH] IN BLOOD BY AUTOMATED COUNT: 15.1 % (ref 12.3–15.4)
EXPIRATION DATE: NORMAL
GLOBULIN UR ELPH-MCNC: 2.4 GM/DL
GLUCOSE SERPL-MCNC: 86 MG/DL (ref 65–99)
HCT VFR BLD AUTO: 45 % (ref 34–46.6)
HGB BLD-MCNC: 15.3 G/DL (ref 12–15.9)
IMM GRANULOCYTES # BLD AUTO: 0.15 10*3/MM3 (ref 0–0.05)
IMM GRANULOCYTES NFR BLD AUTO: 1.3 % (ref 0–0.5)
INTERNAL CONTROL: NORMAL
LYMPHOCYTES # BLD AUTO: 2.56 10*3/MM3 (ref 0.7–3.1)
LYMPHOCYTES NFR BLD AUTO: 22.8 % (ref 19.6–45.3)
Lab: NORMAL
MCH RBC QN AUTO: 30.2 PG (ref 26.6–33)
MCHC RBC AUTO-ENTMCNC: 34 G/DL (ref 31.5–35.7)
MCV RBC AUTO: 88.9 FL (ref 79–97)
MONOCYTES # BLD AUTO: 0.83 10*3/MM3 (ref 0.1–0.9)
MONOCYTES NFR BLD AUTO: 7.4 % (ref 5–12)
NEUTROPHILS NFR BLD AUTO: 64.4 % (ref 42.7–76)
NEUTROPHILS NFR BLD AUTO: 7.25 10*3/MM3 (ref 1.7–7)
NRBC BLD AUTO-RTO: 0 /100 WBC (ref 0–0.2)
PLATELET # BLD AUTO: 224 10*3/MM3 (ref 140–450)
PMV BLD AUTO: 11.6 FL (ref 6–12)
POTASSIUM SERPL-SCNC: 4.8 MMOL/L (ref 3.5–5.2)
PROT SERPL-MCNC: 6.5 G/DL (ref 6–8.5)
RBC # BLD AUTO: 5.06 10*6/MM3 (ref 3.77–5.28)
S PYO AG THROAT QL: NEGATIVE
SODIUM SERPL-SCNC: 138 MMOL/L (ref 136–145)
WBC NRBC COR # BLD AUTO: 11.25 10*3/MM3 (ref 3.4–10.8)

## 2024-07-11 PROCEDURE — 80053 COMPREHEN METABOLIC PANEL: CPT | Performed by: FAMILY MEDICINE

## 2024-07-11 PROCEDURE — 1160F RVW MEDS BY RX/DR IN RCRD: CPT | Performed by: FAMILY MEDICINE

## 2024-07-11 PROCEDURE — 36415 COLL VENOUS BLD VENIPUNCTURE: CPT | Performed by: FAMILY MEDICINE

## 2024-07-11 PROCEDURE — 99214 OFFICE O/P EST MOD 30 MIN: CPT | Performed by: FAMILY MEDICINE

## 2024-07-11 PROCEDURE — 1126F AMNT PAIN NOTED NONE PRSNT: CPT | Performed by: FAMILY MEDICINE

## 2024-07-11 PROCEDURE — 82565 ASSAY OF CREATININE: CPT

## 2024-07-11 PROCEDURE — 1159F MED LIST DOCD IN RCRD: CPT | Performed by: FAMILY MEDICINE

## 2024-07-11 PROCEDURE — 74177 CT ABD & PELVIS W/CONTRAST: CPT

## 2024-07-11 PROCEDURE — 25510000001 IOPAMIDOL PER 1 ML: Performed by: FAMILY MEDICINE

## 2024-07-11 PROCEDURE — 85025 COMPLETE CBC W/AUTO DIFF WBC: CPT | Performed by: FAMILY MEDICINE

## 2024-07-11 PROCEDURE — 87880 STREP A ASSAY W/OPTIC: CPT | Performed by: FAMILY MEDICINE

## 2024-07-11 RX ORDER — PANTOPRAZOLE SODIUM 40 MG/1
40 TABLET, DELAYED RELEASE ORAL DAILY
Qty: 30 TABLET | Refills: 1 | Status: SHIPPED | OUTPATIENT
Start: 2024-07-11

## 2024-07-11 RX ORDER — ONDANSETRON 4 MG/1
4 TABLET, FILM COATED ORAL 4 TIMES DAILY PRN
Qty: 30 TABLET | Refills: 1 | Status: SHIPPED | OUTPATIENT
Start: 2024-07-11

## 2024-07-11 RX ADMIN — IOPAMIDOL 100 ML: 755 INJECTION, SOLUTION INTRAVENOUS at 16:01

## 2024-07-11 NOTE — PROGRESS NOTES
Chief Complaint  Headache, Abdominal Pain (X1 week), Fever, Vomiting (X1 week), and Fatigue    Subjective          Nora Bone presents to [facility]  Abdominal Pain  This is a new problem. Episode onset: 11 days. The onset quality is sudden. The problem occurs constantly. The problem has been unchanged. Pain location: lower abdomen and epigastric. The pain is moderate. The quality of the pain is aching and sharp. The abdominal pain does not radiate. Associated symptoms include anorexia, a fever, headaches, nausea and vomiting. Pertinent negatives include no arthralgias, belching, constipation, diarrhea, dysuria, flatus, frequency, hematochezia, hematuria, melena, myalgias or weight loss. The pain is aggravated by eating. The pain is relieved by Nothing. She has tried nothing for the symptoms.       BMI is within normal parameters. No other follow-up for BMI required.       Objective   Allergies   Allergen Reactions    Hydrocodone-Acetaminophen Shortness Of Breath and Nausea And Vomiting     Also reports shaking       Pollen Extract Shortness Of Breath    Molds & Smuts Rash    Percocet [Oxycodone-Acetaminophen] Other (See Comments)     PT REQUEST PERCOCET BE ADDED TO ALLERGY LIST/PT CANNOT SLEEP WHILE TAKING THIS MEDICINE      Immunization History   Administered Date(s) Administered    ABRYSVO (RSV, 60+ or pregnant women 32-36 wks) 01/06/2024    COVID-19 (MODERNA) 1st,2nd,3rd Dose Monovalent 08/20/2021, 09/17/2021    Flu Vaccine Quad PF >36MO 02/07/2017, 09/27/2018, 10/15/2019, 01/12/2021    Fluzone (or Fluarix & Flulaval for VFC) >6mos 02/07/2017, 10/28/2021    Fluzone High-Dose 65+yrs 11/04/2022    Hepatitis A 03/13/2018, 09/13/2018    Pneumococcal Conjugate 20-Valent (PCV20) 09/12/2023    Pneumococcal Polysaccharide (PPSV23) 10/28/2021    Tdap 09/27/2018     Past Medical History:   Diagnosis Date    Anemia     NO CURRENT ISSUES GETS MONTHLY SHOTS    Anxiety     Asthma      INHALER, CONTROLLED    Back pain     OCCASSIONAL    Cervical cancer     COPD (chronic obstructive pulmonary disease)     no O2 use    Deep vein thrombosis     NO CURRENT ISSUES NO THINNERS    Disease of thyroid gland     Diverticulosis     Fecal incontinence     Hearing loss     Hyperlipidemia     Hypertension     NO MEDS    Muscle weakness     detereration    Peripheral vascular disease     WEARS SUPPORT HOSE/R VARICOSE VEINS    Polymyositis     FOLLOWS BROCK, DAILY PREDNISONE      Past Surgical History:   Procedure Laterality Date    CARPAL TUNNEL RELEASE Bilateral     COLONOSCOPY W/ BIOPSIES      DILATATION AND CURETTAGE      INTERSTIM PLACEMENT N/A 2024    Procedure: basic peripheral nerve evaluation;  Surgeon: Roque Dowd MD;  Location: [facility];  Service: General;  Laterality: N/A;    INTERSTIM PLACEMENT N/A 5/3/2024    Procedure: INTERSTIM STAGES 1 AND 2 LEAD AND GENERATOR PLACEMENT, full implant procedure;  Surgeon: Roque Dowd MD;  Location: [facility];  Service: General;  Laterality: N/A;    MUSCLE BIOPSY Right 2024    Procedure: MUSCLE BIOPSY right thigh;  Surgeon: Roque Dowd MD;  Location: [facility];  Service: General;  Laterality: Right;    VAGINAL HYSTERECTOMY      VEIN SURGERY Right     STRIPPING      Social History     Socioeconomic History    Marital status: Single   Tobacco Use    Smoking status: Former     Current packs/day: 0.00     Average packs/day: 1 pack/day for 20.0 years (20.0 ttl pk-yrs)     Types: Cigarettes     Start date: 1995     Quit date: 2015     Years since quittin.5     Passive exposure: Never    Smokeless tobacco: Never   Vaping Use    Vaping status: Former   Substance and Sexual Activity    Alcohol use: Never    Drug use: Yes     Types: Marijuana     Comment: OCCASSIONAL PRN PAIN/ANXIETY/INST PER ANESTHESIA PROTOCOL    Sexual activity: Defer     Birth control/protection: Surgical        Current Outpatient  "Medications:     albuterol sulfate  (90 Base) MCG/ACT inhaler, Inhale 2 puffs Every 6 (Six) Hours As Needed for Wheezing., Disp: 18 g, Rfl: 3    B-D 3CC LUER-LUCINA SYR 25GX1\" 25G X 1\" 3 ML misc, use once monthly for b12 injection, Disp: 3 each, Rfl: 0    budesonide-formoterol (SYMBICORT) 160-4.5 MCG/ACT inhaler, Inhale 2 puffs 2 (Two) Times a Day., Disp: , Rfl:     cyanocobalamin 1000 MCG/ML injection, Inject 1 mL into the appropriate muscle as directed by prescriber Every 28 (Twenty-Eight) Days., Disp: 3 mL, Rfl: 3    fluticasone (FLONASE) 50 MCG/ACT nasal spray, 1 spray into the nostril(s) as directed by provider Daily. (Patient taking differently: 1 spray into the nostril(s) as directed by provider 2 (Two) Times a Day.), Disp: 48 g, Rfl: 5    levothyroxine (SYNTHROID, LEVOTHROID) 88 MCG tablet, Take 1 tablet by mouth Daily., Disp: 30 tablet, Rfl: 1    lidocaine (Lidoderm) 5 %, Place 3 patches on the skin as directed by provider Daily. Remove & Discard patch within 12 hours or as directed by MD, Disp: 90 patch, Rfl: 1    Probiotic Product (Align) 4 MG capsule, Take 4 mg by mouth Daily., Disp: 90 capsule, Rfl: 1    traMADol (Ultram) 50 MG tablet, Take 1 tablet by mouth Every 6 (Six) Hours As Needed for Moderate Pain., Disp: 8 tablet, Rfl: 0    amitriptyline (ELAVIL) 25 MG tablet, Take 1 tablet by mouth Every Night. (Patient not taking: Reported on 7/11/2024), Disp: 90 tablet, Rfl: 1    Calcium Carb-Cholecalciferol (Calcium 500 + D) 500-5 MG-MCG tablet per tablet, Take 1 tablet by mouth 2 (Two) Times a Day. (Patient not taking: Reported on 7/11/2024), Disp: 180 tablet, Rfl: 1    Dietary Management Product (Vasculera) tablet, Take 1 each by mouth Daily for 360 days. (Patient not taking: Reported on 7/11/2024), Disp: 30 each, Rfl: 11    DULoxetine (Cymbalta) 30 MG capsule, Take 1 capsule by mouth Daily. (Patient not taking: Reported on 7/11/2024), Disp: 30 capsule, Rfl: 1    levocetirizine (XYZAL) 5 MG tablet, " Take 1 tablet by mouth Every Evening. (Patient not taking: Reported on 7/11/2024), Disp: 90 tablet, Rfl: 1    ondansetron (Zofran) 4 MG tablet, Take 1 tablet by mouth 4 (Four) Times a Day As Needed for Nausea or Vomiting., Disp: 30 tablet, Rfl: 1    pantoprazole (Protonix) 40 MG EC tablet, Take 1 tablet by mouth Daily., Disp: 30 tablet, Rfl: 1    polyethylene glycol (MIRALAX) 17 g packet, Take 17 g by mouth Daily. (Patient not taking: Reported on 7/11/2024), Disp: 3 packet, Rfl: 0    predniSONE (DELTASONE) 10 MG tablet, Take 4 tablets by mouth Daily. (Patient not taking: Reported on 7/11/2024), Disp: , Rfl:    Family History   Problem Relation Age of Onset    Ovarian cancer Mother     Heart disease Mother     Thyroid disease Mother     Epilepsy Father     Breast cancer Sister     Breast cancer Paternal Aunt     Malig Hyperthermia Neg Hx           Vital Signs:   Vitals:    07/11/24 0851   BP: 118/70   Pulse: 84   Temp: 98.1 °F (36.7 °C)   SpO2: 98%   Weight: 59.5 kg (131 lb 1.6 oz)       Review of Systems   Constitutional:  Positive for fatigue and fever. Negative for weight loss.   HENT:  Negative for sore throat.    Eyes:  Negative for visual disturbance.   Respiratory:  Negative for cough, chest tightness, shortness of breath and wheezing.    Cardiovascular:  Negative for chest pain, palpitations and leg swelling.   Gastrointestinal:  Positive for abdominal pain, anorexia, nausea and vomiting. Negative for constipation, diarrhea, flatus, hematochezia and melena.   Genitourinary:  Negative for dysuria, frequency and hematuria.   Musculoskeletal:  Negative for arthralgias and myalgias.   Neurological:  Positive for headaches. Negative for dizziness and light-headedness.      Physical Exam  Vitals reviewed.   Constitutional:       Appearance: Normal appearance. She is well-developed.   HENT:      Head: Normocephalic and atraumatic.      Right Ear: Tympanic membrane, ear canal and external ear normal.      Left Ear:  Tympanic membrane, ear canal and external ear normal.      Nose: Nose normal.      Mouth/Throat:      Mouth: Mucous membranes are moist.      Pharynx: Oropharynx is clear. Posterior oropharyngeal erythema present. No oropharyngeal exudate.   Eyes:      Conjunctiva/sclera: Conjunctivae normal.      Pupils: Pupils are equal, round, and reactive to light.   Cardiovascular:      Rate and Rhythm: Normal rate and regular rhythm.      Pulses: Normal pulses.      Heart sounds: Normal heart sounds. No murmur heard.     No friction rub. No gallop.   Pulmonary:      Effort: Pulmonary effort is normal.      Breath sounds: Normal breath sounds. No wheezing or rhonchi.   Abdominal:      General: Abdomen is flat. Bowel sounds are normal. There is no distension.      Palpations: Abdomen is soft. There is no mass.      Tenderness: There is abdominal tenderness. There is no right CVA tenderness, left CVA tenderness, guarding or rebound.      Hernia: No hernia is present.      Comments: Tenderness across lower abdomen and epigastric area   Musculoskeletal:         General: Normal range of motion.      Cervical back: Normal range of motion and neck supple.   Skin:     General: Skin is warm and dry.      Capillary Refill: Capillary refill takes less than 2 seconds.   Neurological:      General: No focal deficit present.      Mental Status: She is alert and oriented to person, place, and time.      Cranial Nerves: No cranial nerve deficit.   Psychiatric:         Mood and Affect: Mood and affect normal.         Behavior: Behavior normal.         Thought Content: Thought content normal.         Judgment: Judgment normal.        Result Review :   The following data was reviewed by: Jarrod Simms MD on 07/11/2024:  CMP          9/12/2023    11:45 3/11/2024    11:39   CMP   Glucose 83  88    BUN 13  14    Creatinine 0.88  0.93    EGFR 72.6  67.5    Sodium 143  142    Potassium 4.5  4.9    Chloride 106  106    Calcium 9.5  9.5    Total  Protein 7.5  7.3    Albumin 4.7  4.6    Globulin 2.8  2.7    Total Bilirubin 0.3  0.3    Alkaline Phosphatase 92  103    AST (SGOT) 26  27    ALT (SGPT) 27  23    Albumin/Globulin Ratio 1.7  1.7    BUN/Creatinine Ratio 14.8  15.1    Anion Gap 11.3  9.0      CBC          3/11/2024    11:39   CBC   WBC 8.65    RBC 5.19    Hemoglobin 15.4    Hematocrit 45.4    MCV 87.5    MCH 29.7    MCHC 33.9    RDW 13.7    Platelets 260      Lipid Panel          9/12/2023    11:45 3/11/2024    11:39   Lipid Panel   Total Cholesterol 235  245    Triglycerides 138  174    HDL Cholesterol 59  54    VLDL Cholesterol 25  32    LDL Cholesterol  151  159    LDL/HDL Ratio 2.52  2.89      TSH          3/11/2024    11:39 5/20/2024    14:47 6/25/2024    13:34   TSH   TSH 0.250  0.162  1.730                Assessment and Plan    Diagnoses and all orders for this visit:    1. Fever, unspecified fever cause (Primary)  -     CT Abdomen Pelvis With Contrast; Future  -     CBC Auto Differential  -     Comprehensive Metabolic Panel  -     POCT rapid strep A    2. Nausea and vomiting, unspecified vomiting type  -     CT Abdomen Pelvis With Contrast; Future  -     Comprehensive Metabolic Panel  -     POCT rapid strep A  -     H. Pylori Antigen, Stool - Stool, Per Rectum  -     ondansetron (Zofran) 4 MG tablet; Take 1 tablet by mouth 4 (Four) Times a Day As Needed for Nausea or Vomiting.  Dispense: 30 tablet; Refill: 1    3. Lower abdominal pain  -     CT Abdomen Pelvis With Contrast; Future  -     CBC Auto Differential    4. Epigastric pain  -     Comprehensive Metabolic Panel  -     pantoprazole (Protonix) 40 MG EC tablet; Take 1 tablet by mouth Daily.  Dispense: 30 tablet; Refill: 1            Follow Up   Return in about 1 week (around 7/18/2024), or if symptoms worsen or fail to improve, for Recheck.  Patient was given instructions and counseling regarding her condition or for health maintenance advice. Please see specific information pulled into the  AVS if appropriate.

## 2024-07-11 NOTE — PROGRESS NOTES
Venipuncture Blood Specimen Collection  Venipuncture performed in left arm by Fior Goldberg with good hemostasis. Patient tolerated the procedure well without complications.   07/11/24   Fior Goldberg

## 2024-07-13 DIAGNOSIS — R10.30 LOWER ABDOMINAL PAIN: Primary | ICD-10-CM

## 2024-07-13 RX ORDER — SULFAMETHOXAZOLE AND TRIMETHOPRIM 800; 160 MG/1; MG/1
1 TABLET ORAL 2 TIMES DAILY
Qty: 14 TABLET | Refills: 0 | Status: SHIPPED | OUTPATIENT
Start: 2024-07-13 | End: 2024-07-20

## 2024-07-13 NOTE — PROGRESS NOTES
Call pt:  labs show elevated live enzymes and WBC's.  Because of this and your symptoms, I am going to send out bactrim antibiotic to take.  Recheck CMP next week and I will get ebv titers since liver enzymes are elevated.  If symptoms worsen over the weekend, please go straight to ER for further evaluation and treatment.

## 2024-07-15 ENCOUNTER — CLINICAL SUPPORT (OUTPATIENT)
Dept: FAMILY MEDICINE CLINIC | Facility: CLINIC | Age: 69
End: 2024-07-15
Payer: MEDICARE

## 2024-07-15 DIAGNOSIS — R10.30 LOWER ABDOMINAL PAIN: ICD-10-CM

## 2024-07-15 LAB
ALBUMIN SERPL-MCNC: 3.9 G/DL (ref 3.5–5.2)
ALBUMIN/GLOB SERPL: 1.8 G/DL
ALP SERPL-CCNC: 111 U/L (ref 39–117)
ALT SERPL W P-5'-P-CCNC: 91 U/L (ref 1–33)
ANION GAP SERPL CALCULATED.3IONS-SCNC: 12 MMOL/L (ref 5–15)
AST SERPL-CCNC: 45 U/L (ref 1–32)
BILIRUB SERPL-MCNC: 0.2 MG/DL (ref 0–1.2)
BUN SERPL-MCNC: 22 MG/DL (ref 8–23)
BUN/CREAT SERPL: 19.5 (ref 7–25)
CALCIUM SPEC-SCNC: 8.9 MG/DL (ref 8.6–10.5)
CHLORIDE SERPL-SCNC: 104 MMOL/L (ref 98–107)
CO2 SERPL-SCNC: 23 MMOL/L (ref 22–29)
CREAT SERPL-MCNC: 1.13 MG/DL (ref 0.57–1)
EGFRCR SERPLBLD CKD-EPI 2021: 53.4 ML/MIN/1.73
GLOBULIN UR ELPH-MCNC: 2.2 GM/DL
GLUCOSE SERPL-MCNC: 100 MG/DL (ref 65–99)
POTASSIUM SERPL-SCNC: 4.5 MMOL/L (ref 3.5–5.2)
PROT SERPL-MCNC: 6.1 G/DL (ref 6–8.5)
SODIUM SERPL-SCNC: 139 MMOL/L (ref 136–145)

## 2024-07-15 PROCEDURE — 80053 COMPREHEN METABOLIC PANEL: CPT | Performed by: FAMILY MEDICINE

## 2024-07-15 PROCEDURE — 36415 COLL VENOUS BLD VENIPUNCTURE: CPT | Performed by: FAMILY MEDICINE

## 2024-07-15 PROCEDURE — 86664 EPSTEIN-BARR NUCLEAR ANTIGEN: CPT | Performed by: FAMILY MEDICINE

## 2024-07-15 PROCEDURE — 86665 EPSTEIN-BARR CAPSID VCA: CPT | Performed by: FAMILY MEDICINE

## 2024-07-15 PROCEDURE — 86663 EPSTEIN-BARR ANTIBODY: CPT | Performed by: FAMILY MEDICINE

## 2024-07-15 NOTE — PROGRESS NOTES
..  Venipuncture Blood Specimen Collection  Venipuncture performed in LT arm by Fior Goldberg with good hemostasis. Patient tolerated the procedure well without complications.   07/15/24   Barb Sims MA

## 2024-07-16 LAB
EBV EA-D IGG SER-ACNC: 17.6 U/ML (ref 0–8.9)
EBV NA IGG SER IA-ACNC: >600 U/ML (ref 0–17.9)
EBV VCA IGG SER IA-ACNC: >600 U/ML (ref 0–17.9)
EBV VCA IGM SER IA-ACNC: <36 U/ML (ref 0–35.9)
SERVICE CMNT-IMP: ABNORMAL

## 2024-07-16 NOTE — PROGRESS NOTES
Labs show elevated liver enzymes and decreased kidney function.  Follow-up, as scheduled, to discuss.

## 2024-07-18 ENCOUNTER — OFFICE VISIT (OUTPATIENT)
Dept: FAMILY MEDICINE CLINIC | Facility: CLINIC | Age: 69
End: 2024-07-18
Payer: MEDICARE

## 2024-07-18 ENCOUNTER — CLINICAL SUPPORT (OUTPATIENT)
Dept: FAMILY MEDICINE CLINIC | Facility: CLINIC | Age: 69
End: 2024-07-18
Payer: MEDICARE

## 2024-07-18 VITALS
WEIGHT: 134.6 LBS | SYSTOLIC BLOOD PRESSURE: 130 MMHG | HEART RATE: 92 BPM | BODY MASS INDEX: 23.84 KG/M2 | DIASTOLIC BLOOD PRESSURE: 85 MMHG | OXYGEN SATURATION: 96 % | TEMPERATURE: 97.8 F

## 2024-07-18 DIAGNOSIS — N28.9 RENAL INSUFFICIENCY: Primary | ICD-10-CM

## 2024-07-18 DIAGNOSIS — R74.8 ELEVATED LIVER ENZYMES: ICD-10-CM

## 2024-07-18 DIAGNOSIS — I87.2 VENOUS INSUFFICIENCY (CHRONIC) (PERIPHERAL): Primary | ICD-10-CM

## 2024-07-18 LAB
ALBUMIN SERPL-MCNC: 4.1 G/DL (ref 3.5–5.2)
ALBUMIN/GLOB SERPL: 1.6 G/DL
ALP SERPL-CCNC: 110 U/L (ref 39–117)
ALT SERPL W P-5'-P-CCNC: 73 U/L (ref 1–33)
ANION GAP SERPL CALCULATED.3IONS-SCNC: 10 MMOL/L (ref 5–15)
AST SERPL-CCNC: 41 U/L (ref 1–32)
BILIRUB SERPL-MCNC: 0.3 MG/DL (ref 0–1.2)
BUN SERPL-MCNC: 22 MG/DL (ref 8–23)
BUN/CREAT SERPL: 14.6 (ref 7–25)
CALCIUM SPEC-SCNC: 9.2 MG/DL (ref 8.6–10.5)
CHLORIDE SERPL-SCNC: 102 MMOL/L (ref 98–107)
CO2 SERPL-SCNC: 25 MMOL/L (ref 22–29)
CREAT SERPL-MCNC: 1.51 MG/DL (ref 0.57–1)
EGFRCR SERPLBLD CKD-EPI 2021: 37.7 ML/MIN/1.73
GLOBULIN UR ELPH-MCNC: 2.5 GM/DL
GLUCOSE SERPL-MCNC: 89 MG/DL (ref 65–99)
H. PYLORI ANTIGEN STOOL: NEGATIVE
POTASSIUM SERPL-SCNC: 4.4 MMOL/L (ref 3.5–5.2)
PROT SERPL-MCNC: 6.6 G/DL (ref 6–8.5)
SODIUM SERPL-SCNC: 137 MMOL/L (ref 136–145)

## 2024-07-18 PROCEDURE — 99213 OFFICE O/P EST LOW 20 MIN: CPT | Performed by: FAMILY MEDICINE

## 2024-07-18 PROCEDURE — 1159F MED LIST DOCD IN RCRD: CPT | Performed by: FAMILY MEDICINE

## 2024-07-18 PROCEDURE — 87338 HPYLORI STOOL AG IA: CPT | Performed by: FAMILY MEDICINE

## 2024-07-18 PROCEDURE — G2211 COMPLEX E/M VISIT ADD ON: HCPCS | Performed by: FAMILY MEDICINE

## 2024-07-18 PROCEDURE — 1126F AMNT PAIN NOTED NONE PRSNT: CPT | Performed by: FAMILY MEDICINE

## 2024-07-18 PROCEDURE — 1160F RVW MEDS BY RX/DR IN RCRD: CPT | Performed by: FAMILY MEDICINE

## 2024-07-18 PROCEDURE — 80053 COMPREHEN METABOLIC PANEL: CPT | Performed by: FAMILY MEDICINE

## 2024-07-18 NOTE — PROGRESS NOTES
Chief Complaint  Elevated Hepatic Enzymes, decreased kidney function, and abnormal lab results    Subjective          Nora Bone presents to [facility]  History of Present Illness  Pt had recent reactivation of mono- discussed labs  Decreased kidney function that was improving  Elevated liver enzymes- likely caused by mono  Pt doing better  Pt drinking plenty of water- pt will stop her ibuprofen due to kidneys- shital refer to nephrology if still decreased  Pt says abdominal pain has resolved- still has chronic nausea- pt will see GI next week      BMI is within normal parameters. No other follow-up for BMI required.       Objective   Allergies   Allergen Reactions    Hydrocodone-Acetaminophen Shortness Of Breath and Nausea And Vomiting     Also reports shaking       Pollen Extract Shortness Of Breath    Molds & Smuts Rash    Percocet [Oxycodone-Acetaminophen] Other (See Comments)     PT REQUEST PERCOCET BE ADDED TO ALLERGY LIST/PT CANNOT SLEEP WHILE TAKING THIS MEDICINE      Immunization History   Administered Date(s) Administered    ABRYSVO (RSV, 60+ or pregnant women 32-36 wks) 01/06/2024    COVID-19 (MODERNA) 1st,2nd,3rd Dose Monovalent 08/20/2021, 09/17/2021    Flu Vaccine Quad PF >36MO 02/07/2017, 09/27/2018, 10/15/2019, 01/12/2021    Fluzone (or Fluarix & Flulaval for VFC) >6mos 02/07/2017, 10/28/2021    Fluzone High-Dose 65+yrs 11/04/2022    Hepatitis A 03/13/2018, 09/13/2018    Pneumococcal Conjugate 20-Valent (PCV20) 09/12/2023    Pneumococcal Polysaccharide (PPSV23) 10/28/2021    Tdap 09/27/2018     Past Medical History:   Diagnosis Date    Anemia     NO CURRENT ISSUES GETS MONTHLY SHOTS    Anxiety     Asthma     INHALER, CONTROLLED    Back pain     OCCASSIONAL    Cervical cancer     COPD (chronic obstructive pulmonary disease)     no O2 use    Deep vein thrombosis     NO CURRENT ISSUES NO THINNERS    Disease of thyroid gland     Diverticulosis     Fecal incontinence      Hearing loss     Hyperlipidemia     Hypertension     NO MEDS    Muscle weakness     detereration    Peripheral vascular disease     WEARS SUPPORT HOSE/R VARICOSE VEINS    Polymyositis     FOLLOWS BROCK, DAILY PREDNISONE      Past Surgical History:   Procedure Laterality Date    CARPAL TUNNEL RELEASE Bilateral     COLONOSCOPY W/ BIOPSIES      DILATATION AND CURETTAGE      INTERSTIM PLACEMENT N/A 2024    Procedure: basic peripheral nerve evaluation;  Surgeon: Roque Dowd MD;  Location: [facility];  Service: General;  Laterality: N/A;    INTERSTIM PLACEMENT N/A 5/3/2024    Procedure: INTERSTIM STAGES 1 AND 2 LEAD AND GENERATOR PLACEMENT, full implant procedure;  Surgeon: Roque Dowd MD;  Location: [facility];  Service: General;  Laterality: N/A;    MUSCLE BIOPSY Right 2024    Procedure: MUSCLE BIOPSY right thigh;  Surgeon: Roque Dowd MD;  Location: [facility];  Service: General;  Laterality: Right;    VAGINAL HYSTERECTOMY      VEIN SURGERY Right     STRIPPING      Social History     Socioeconomic History    Marital status: Single   Tobacco Use    Smoking status: Former     Current packs/day: 0.00     Average packs/day: 1 pack/day for 20.0 years (20.0 ttl pk-yrs)     Types: Cigarettes     Start date: 1995     Quit date: 2015     Years since quittin.5     Passive exposure: Never    Smokeless tobacco: Never   Vaping Use    Vaping status: Former   Substance and Sexual Activity    Alcohol use: Never    Drug use: Yes     Types: Marijuana     Comment: OCCASSIONAL PRN PAIN/ANXIETY/INST PER ANESTHESIA PROTOCOL    Sexual activity: Defer     Birth control/protection: Surgical        Current Outpatient Medications:     amitriptyline (ELAVIL) 25 MG tablet, Take 1 tablet by mouth Every Night., Disp: 90 tablet, Rfl: 1    Calcium Carb-Cholecalciferol (Calcium 500 + D) 500-5 MG-MCG tablet per tablet, Take 1 tablet by mouth 2 (Two) Times a Day., Disp: 180 tablet, Rfl:  "1    Dietary Management Product (Vasculera) tablet, Take 1 each by mouth Daily for 360 days., Disp: 30 each, Rfl: 11    DULoxetine (Cymbalta) 30 MG capsule, Take 1 capsule by mouth Daily., Disp: 30 capsule, Rfl: 1    fluticasone (FLONASE) 50 MCG/ACT nasal spray, 1 spray into the nostril(s) as directed by provider Daily. (Patient taking differently: 1 spray into the nostril(s) as directed by provider 2 (Two) Times a Day.), Disp: 48 g, Rfl: 5    levocetirizine (XYZAL) 5 MG tablet, Take 1 tablet by mouth Every Evening., Disp: 90 tablet, Rfl: 1    albuterol sulfate  (90 Base) MCG/ACT inhaler, Inhale 2 puffs Every 6 (Six) Hours As Needed for Wheezing., Disp: 18 g, Rfl: 3    B-D 3CC LUER-LUCINA SYR 25GX1\" 25G X 1\" 3 ML misc, use once monthly for b12 injection, Disp: 3 each, Rfl: 0    budesonide-formoterol (SYMBICORT) 160-4.5 MCG/ACT inhaler, Inhale 2 puffs 2 (Two) Times a Day., Disp: , Rfl:     cyanocobalamin 1000 MCG/ML injection, Inject 1 mL into the appropriate muscle as directed by prescriber Every 28 (Twenty-Eight) Days., Disp: 3 mL, Rfl: 3    levothyroxine (SYNTHROID, LEVOTHROID) 88 MCG tablet, Take 1 tablet by mouth Daily., Disp: 30 tablet, Rfl: 1    lidocaine (Lidoderm) 5 %, Place 3 patches on the skin as directed by provider Daily. Remove & Discard patch within 12 hours or as directed by MD, Disp: 90 patch, Rfl: 1    ondansetron (Zofran) 4 MG tablet, Take 1 tablet by mouth 4 (Four) Times a Day As Needed for Nausea or Vomiting., Disp: 30 tablet, Rfl: 1    pantoprazole (Protonix) 40 MG EC tablet, Take 1 tablet by mouth Daily., Disp: 30 tablet, Rfl: 1    polyethylene glycol (MIRALAX) 17 g packet, Take 17 g by mouth Daily. (Patient not taking: Reported on 7/11/2024), Disp: 3 packet, Rfl: 0    predniSONE (DELTASONE) 10 MG tablet, Take 4 tablets by mouth Daily. (Patient not taking: Reported on 7/11/2024), Disp: , Rfl:     Probiotic Product (Align) 4 MG capsule, Take 4 mg by mouth Daily., Disp: 90 capsule, Rfl: 1    " sulfamethoxazole-trimethoprim (Bactrim DS) 800-160 MG per tablet, Take 1 tablet by mouth 2 (Two) Times a Day for 7 days., Disp: 14 tablet, Rfl: 0   Family History   Problem Relation Age of Onset    Ovarian cancer Mother     Heart disease Mother     Thyroid disease Mother     Epilepsy Father     Breast cancer Sister     Breast cancer Paternal Aunt     Malig Hyperthermia Neg Hx           Vital Signs:   Vitals:    07/18/24 1000 07/18/24 1038   BP: 130/90 130/85   Pulse: 92    Temp: 97.8 °F (36.6 °C)    SpO2: 96%    Weight: 61.1 kg (134 lb 9.6 oz)        Review of Systems   Constitutional:  Negative for fatigue and fever.   HENT:  Negative for sore throat.    Eyes:  Negative for visual disturbance.   Respiratory:  Negative for cough, chest tightness, shortness of breath and wheezing.    Cardiovascular:  Negative for chest pain, palpitations and leg swelling.   Gastrointestinal:  Negative for abdominal pain, diarrhea, nausea and vomiting.   Neurological:  Negative for dizziness, light-headedness and headaches.      Physical Exam  Vitals reviewed.   Constitutional:       Appearance: Normal appearance. She is well-developed.   HENT:      Head: Normocephalic and atraumatic.      Right Ear: External ear normal.      Left Ear: External ear normal.      Mouth/Throat:      Pharynx: No oropharyngeal exudate.   Eyes:      Conjunctiva/sclera: Conjunctivae normal.      Pupils: Pupils are equal, round, and reactive to light.   Cardiovascular:      Rate and Rhythm: Normal rate and regular rhythm.      Pulses: Normal pulses.      Heart sounds: Normal heart sounds. No murmur heard.     No friction rub. No gallop.   Pulmonary:      Effort: Pulmonary effort is normal.      Breath sounds: Normal breath sounds. No wheezing or rhonchi.   Abdominal:      General: Abdomen is flat. Bowel sounds are normal. There is no distension.      Palpations: Abdomen is soft. There is no mass.      Tenderness: There is no abdominal tenderness. There is no  guarding or rebound.      Hernia: No hernia is present.   Musculoskeletal:         General: Normal range of motion.   Skin:     General: Skin is warm and dry.      Capillary Refill: Capillary refill takes less than 2 seconds.   Neurological:      General: No focal deficit present.      Mental Status: She is alert and oriented to person, place, and time.      Cranial Nerves: No cranial nerve deficit.   Psychiatric:         Mood and Affect: Mood and affect normal.         Behavior: Behavior normal.         Thought Content: Thought content normal.         Judgment: Judgment normal.        Result Review :   The following data was reviewed by: Jarrod Simms MD on 07/18/2024:  CMP          3/11/2024    11:39 7/11/2024    09:19 7/11/2024    15:57 7/15/2024    09:54   CMP   Glucose 88  86   100    BUN 14  20   22    Creatinine 0.93  1.07  1.20  1.13    EGFR 67.5  57.0  49.7  53.4    Sodium 142  138   139    Potassium 4.9  4.8   4.5    Chloride 106  101   104    Calcium 9.5  8.9   8.9    Total Protein 7.3  6.5   6.1    Albumin 4.6  4.1   3.9    Globulin 2.7  2.4   2.2    Total Bilirubin 0.3  0.7   0.2    Alkaline Phosphatase 103  123   111    AST (SGOT) 27  110   45    ALT (SGPT) 23  147   91    Albumin/Globulin Ratio 1.7  1.7   1.8    BUN/Creatinine Ratio 15.1  18.7   19.5    Anion Gap 9.0  9.0   12.0      CBC          3/11/2024    11:39 7/11/2024    09:19   CBC   WBC 8.65  11.25    RBC 5.19  5.06    Hemoglobin 15.4  15.3    Hematocrit 45.4  45.0    MCV 87.5  88.9    MCH 29.7  30.2    MCHC 33.9  34.0    RDW 13.7  15.1    Platelets 260  224      Lipid Panel          9/12/2023    11:45 3/11/2024    11:39   Lipid Panel   Total Cholesterol 235  245    Triglycerides 138  174    HDL Cholesterol 59  54    VLDL Cholesterol 25  32    LDL Cholesterol  151  159    LDL/HDL Ratio 2.52  2.89      TSH          3/11/2024    11:39 5/20/2024    14:47 6/25/2024    13:34   TSH   TSH 0.250  0.162  1.730                Assessment and Plan     Diagnoses and all orders for this visit:    1. Renal insufficiency (Primary)  -     Comprehensive Metabolic Panel    2. Elevated liver enzymes  -     Comprehensive Metabolic Panel            Follow Up   Return if symptoms worsen or fail to improve.  Patient was given instructions and counseling regarding her condition or for health maintenance advice. Please see specific information pulled into the AVS if appropriate.

## 2024-07-19 DIAGNOSIS — N28.9 RENAL INSUFFICIENCY: Primary | ICD-10-CM

## 2024-07-19 DIAGNOSIS — R74.8 ELEVATED LIVER ENZYMES: ICD-10-CM

## 2024-07-19 NOTE — PROGRESS NOTES
Call pt: labs show continued elevated liver enzymes and decreased kidney function so will refer to GI and nephrology.  Follow-up if you have any questions.  Recheck labs in 2-3 weeks.

## 2024-07-23 ENCOUNTER — TELEPHONE (OUTPATIENT)
Dept: GASTROENTEROLOGY | Facility: CLINIC | Age: 69
End: 2024-07-23

## 2024-07-23 NOTE — TELEPHONE ENCOUNTER
The patient left a voicemail on Nella's phone this morning at 8:11 am stating that her spouse had a stroke this morning and she would not be able to make it to her appointment today with Fabiola. I have gone ahead and cancelled her appointment for today at 10:45 am and will send out a [employer] message to the patient to advise her on the cancellation.

## 2024-07-24 DIAGNOSIS — M19.90 ARTHRITIS: ICD-10-CM

## 2024-07-24 DIAGNOSIS — E03.9 HYPOTHYROIDISM, UNSPECIFIED TYPE: ICD-10-CM

## 2024-07-24 RX ORDER — LEVOTHYROXINE SODIUM 88 UG/1
88 TABLET ORAL DAILY
Qty: 30 TABLET | Refills: 1 | Status: SHIPPED | OUTPATIENT
Start: 2024-07-24

## 2024-07-25 RX ORDER — DULOXETIN HYDROCHLORIDE 30 MG/1
30 CAPSULE, DELAYED RELEASE ORAL DAILY
Qty: 30 CAPSULE | Refills: 1 | Status: SHIPPED | OUTPATIENT
Start: 2024-07-25

## 2024-08-06 DIAGNOSIS — R11.2 NAUSEA AND VOMITING, UNSPECIFIED VOMITING TYPE: ICD-10-CM

## 2024-08-06 RX ORDER — ONDANSETRON 4 MG/1
4 TABLET, FILM COATED ORAL 4 TIMES DAILY PRN
Qty: 30 TABLET | Refills: 1 | Status: SHIPPED | OUTPATIENT
Start: 2024-08-06

## 2024-08-26 DIAGNOSIS — R10.13 EPIGASTRIC PAIN: ICD-10-CM

## 2024-08-26 RX ORDER — PANTOPRAZOLE SODIUM 40 MG/1
40 TABLET, DELAYED RELEASE ORAL DAILY
Qty: 30 TABLET | Refills: 1 | Status: SHIPPED | OUTPATIENT
Start: 2024-08-26

## 2024-08-30 ENCOUNTER — TRANSCRIBE ORDERS (OUTPATIENT)
Dept: LAB | Facility: HOSPITAL | Age: 69
End: 2024-08-30
Payer: MEDICARE

## 2024-08-30 ENCOUNTER — LAB (OUTPATIENT)
Dept: LAB | Facility: HOSPITAL | Age: 69
End: 2024-08-30
Payer: MEDICARE

## 2024-08-30 DIAGNOSIS — I10 ESSENTIAL HYPERTENSION, MALIGNANT: ICD-10-CM

## 2024-08-30 DIAGNOSIS — N18.30 STAGE 3 CHRONIC KIDNEY DISEASE, UNSPECIFIED WHETHER STAGE 3A OR 3B CKD: ICD-10-CM

## 2024-08-30 DIAGNOSIS — N18.30 STAGE 3 CHRONIC KIDNEY DISEASE, UNSPECIFIED WHETHER STAGE 3A OR 3B CKD: Primary | ICD-10-CM

## 2024-08-30 LAB
CREAT UR-MCNC: 311.9 MG/DL
PROT ?TM UR-MCNC: 32.6 MG/DL
PROT/CREAT UR: 0.1 MG/G{CREAT}

## 2024-08-30 PROCEDURE — 85025 COMPLETE CBC W/AUTO DIFF WBC: CPT

## 2024-08-30 PROCEDURE — 81001 URINALYSIS AUTO W/SCOPE: CPT

## 2024-08-30 PROCEDURE — 82570 ASSAY OF URINE CREATININE: CPT

## 2024-08-30 PROCEDURE — 80053 COMPREHEN METABOLIC PANEL: CPT

## 2024-08-30 PROCEDURE — 36415 COLL VENOUS BLD VENIPUNCTURE: CPT

## 2024-08-30 PROCEDURE — 84156 ASSAY OF PROTEIN URINE: CPT

## 2024-08-30 PROCEDURE — 82043 UR ALBUMIN QUANTITATIVE: CPT

## 2024-08-31 LAB
ALBUMIN SERPL-MCNC: 4.4 G/DL (ref 3.5–5.2)
ALBUMIN UR-MCNC: 4.6 MG/DL
ALBUMIN/GLOB SERPL: 1.6 G/DL
ALP SERPL-CCNC: 106 U/L (ref 39–117)
ALT SERPL W P-5'-P-CCNC: 19 U/L (ref 1–33)
ANION GAP SERPL CALCULATED.3IONS-SCNC: 10.8 MMOL/L (ref 5–15)
AST SERPL-CCNC: 28 U/L (ref 1–32)
BACTERIA UR QL AUTO: NORMAL /HPF
BASOPHILS # BLD AUTO: 0.14 10*3/MM3 (ref 0–0.2)
BASOPHILS NFR BLD AUTO: 1.3 % (ref 0–1.5)
BILIRUB SERPL-MCNC: 0.3 MG/DL (ref 0–1.2)
BILIRUB UR QL STRIP: NEGATIVE
BUN SERPL-MCNC: 9 MG/DL (ref 8–23)
BUN/CREAT SERPL: 7.6 (ref 7–25)
CALCIUM SPEC-SCNC: 9.7 MG/DL (ref 8.6–10.5)
CHLORIDE SERPL-SCNC: 105 MMOL/L (ref 98–107)
CLARITY UR: ABNORMAL
CO2 SERPL-SCNC: 25.2 MMOL/L (ref 22–29)
COD CRY URNS QL: PRESENT /HPF
COLOR UR: ABNORMAL
CREAT SERPL-MCNC: 1.18 MG/DL (ref 0.57–1)
CREAT UR-MCNC: 296.8 MG/DL
DEPRECATED RDW RBC AUTO: 44.4 FL (ref 37–54)
EGFRCR SERPLBLD CKD-EPI 2021: 50.7 ML/MIN/1.73
EOSINOPHIL # BLD AUTO: 0.28 10*3/MM3 (ref 0–0.4)
EOSINOPHIL NFR BLD AUTO: 2.6 % (ref 0.3–6.2)
ERYTHROCYTE [DISTWIDTH] IN BLOOD BY AUTOMATED COUNT: 13.6 % (ref 12.3–15.4)
GLOBULIN UR ELPH-MCNC: 2.8 GM/DL
GLUCOSE SERPL-MCNC: 85 MG/DL (ref 65–99)
GLUCOSE UR STRIP-MCNC: NEGATIVE MG/DL
HCT VFR BLD AUTO: 48.1 % (ref 34–46.6)
HGB BLD-MCNC: 15.7 G/DL (ref 12–15.9)
HGB UR QL STRIP.AUTO: NEGATIVE
HYALINE CASTS UR QL AUTO: NORMAL /LPF
IMM GRANULOCYTES # BLD AUTO: 0.06 10*3/MM3 (ref 0–0.05)
IMM GRANULOCYTES NFR BLD AUTO: 0.6 % (ref 0–0.5)
KETONES UR QL STRIP: ABNORMAL
LEUKOCYTE ESTERASE UR QL STRIP.AUTO: ABNORMAL
LYMPHOCYTES # BLD AUTO: 2.98 10*3/MM3 (ref 0.7–3.1)
LYMPHOCYTES NFR BLD AUTO: 28 % (ref 19.6–45.3)
MCH RBC QN AUTO: 29.5 PG (ref 26.6–33)
MCHC RBC AUTO-ENTMCNC: 32.6 G/DL (ref 31.5–35.7)
MCV RBC AUTO: 90.4 FL (ref 79–97)
MICROALBUMIN/CREAT UR: 15.5 MG/G (ref 0–29)
MONOCYTES # BLD AUTO: 0.66 10*3/MM3 (ref 0.1–0.9)
MONOCYTES NFR BLD AUTO: 6.2 % (ref 5–12)
NEUTROPHILS NFR BLD AUTO: 6.53 10*3/MM3 (ref 1.7–7)
NEUTROPHILS NFR BLD AUTO: 61.3 % (ref 42.7–76)
NITRITE UR QL STRIP: NEGATIVE
NRBC BLD AUTO-RTO: 0 /100 WBC (ref 0–0.2)
PH UR STRIP.AUTO: 5.5 [PH] (ref 5–8)
PLATELET # BLD AUTO: 170 10*3/MM3 (ref 140–450)
PMV BLD AUTO: 12.7 FL (ref 6–12)
POTASSIUM SERPL-SCNC: 3.9 MMOL/L (ref 3.5–5.2)
PROT SERPL-MCNC: 7.2 G/DL (ref 6–8.5)
PROT UR QL STRIP: ABNORMAL
RBC # BLD AUTO: 5.32 10*6/MM3 (ref 3.77–5.28)
RBC # UR STRIP: NORMAL /HPF
REF LAB TEST METHOD: NORMAL
SODIUM SERPL-SCNC: 141 MMOL/L (ref 136–145)
SP GR UR STRIP: 1.03 (ref 1–1.03)
SQUAMOUS #/AREA URNS HPF: NORMAL /HPF
UROBILINOGEN UR QL STRIP: ABNORMAL
WBC # UR STRIP: NORMAL /HPF
WBC NRBC COR # BLD AUTO: 10.65 10*3/MM3 (ref 3.4–10.8)

## 2024-09-16 DIAGNOSIS — E03.9 HYPOTHYROIDISM, UNSPECIFIED TYPE: ICD-10-CM

## 2024-09-16 DIAGNOSIS — M19.90 ARTHRITIS: ICD-10-CM

## 2024-09-16 RX ORDER — DULOXETIN HYDROCHLORIDE 30 MG/1
30 CAPSULE, DELAYED RELEASE ORAL DAILY
Qty: 30 CAPSULE | Refills: 1 | Status: SHIPPED | OUTPATIENT
Start: 2024-09-16

## 2024-09-16 RX ORDER — LEVOTHYROXINE SODIUM 88 UG/1
88 TABLET ORAL DAILY
Qty: 30 TABLET | Refills: 1 | Status: SHIPPED | OUTPATIENT
Start: 2024-09-16

## 2024-09-18 DIAGNOSIS — J30.89 NON-SEASONAL ALLERGIC RHINITIS, UNSPECIFIED TRIGGER: ICD-10-CM

## 2024-09-19 RX ORDER — BUDESONIDE AND FORMOTEROL FUMARATE DIHYDRATE 160; 4.5 UG/1; UG/1
2 AEROSOL RESPIRATORY (INHALATION) 2 TIMES DAILY
Qty: 10.2 G | Refills: 2 | Status: SHIPPED | OUTPATIENT
Start: 2024-09-19

## 2024-09-19 RX ORDER — FLUTICASONE PROPIONATE 50 MCG
1 SPRAY, SUSPENSION (ML) NASAL DAILY
Qty: 48 G | Refills: 5 | Status: SHIPPED | OUTPATIENT
Start: 2024-09-19

## 2024-09-24 DIAGNOSIS — K59.00 CONSTIPATION, UNSPECIFIED CONSTIPATION TYPE: ICD-10-CM

## 2024-09-24 DIAGNOSIS — M85.80 OSTEOPENIA, UNSPECIFIED LOCATION: ICD-10-CM

## 2024-09-24 DIAGNOSIS — M19.90 ARTHRITIS: ICD-10-CM

## 2024-09-24 RX ORDER — ALUMINUM ZIRCONIUM OCTACHLOROHYDREX GLY 16 G/100G
1 GEL TOPICAL DAILY
Qty: 90 CAPSULE | Refills: 1 | Status: SHIPPED | OUTPATIENT
Start: 2024-09-24

## 2024-09-26 ENCOUNTER — TRANSCRIBE ORDERS (OUTPATIENT)
Dept: ADMINISTRATIVE | Facility: HOSPITAL | Age: 69
End: 2024-09-26
Payer: MEDICARE

## 2024-09-26 DIAGNOSIS — Z78.0 POST-MENOPAUSAL: Primary | ICD-10-CM

## 2024-09-27 ENCOUNTER — OFFICE VISIT (OUTPATIENT)
Dept: FAMILY MEDICINE CLINIC | Facility: CLINIC | Age: 69
End: 2024-09-27
Payer: MEDICARE

## 2024-09-27 VITALS
SYSTOLIC BLOOD PRESSURE: 120 MMHG | WEIGHT: 124.9 LBS | HEART RATE: 85 BPM | HEIGHT: 63 IN | BODY MASS INDEX: 22.13 KG/M2 | DIASTOLIC BLOOD PRESSURE: 80 MMHG | TEMPERATURE: 97.7 F | OXYGEN SATURATION: 95 %

## 2024-09-27 DIAGNOSIS — Z12.2 SCREENING FOR LUNG CANCER: ICD-10-CM

## 2024-09-27 DIAGNOSIS — Z00.00 MEDICARE ANNUAL WELLNESS VISIT, SUBSEQUENT: Primary | ICD-10-CM

## 2024-09-27 DIAGNOSIS — E03.9 HYPOTHYROIDISM, UNSPECIFIED TYPE: ICD-10-CM

## 2024-09-27 DIAGNOSIS — Z87.891 PERSONAL HISTORY OF NICOTINE DEPENDENCE: ICD-10-CM

## 2024-09-27 DIAGNOSIS — Z12.31 ENCOUNTER FOR SCREENING MAMMOGRAM FOR MALIGNANT NEOPLASM OF BREAST: ICD-10-CM

## 2024-09-27 DIAGNOSIS — Z23 NEED FOR INFLUENZA VACCINATION: ICD-10-CM

## 2024-09-27 DIAGNOSIS — E78.2 MIXED HYPERLIPIDEMIA: ICD-10-CM

## 2024-09-27 LAB
ALBUMIN SERPL-MCNC: 3.4 G/DL (ref 3.5–5.2)
ALBUMIN/GLOB SERPL: 1.1 G/DL
ALP SERPL-CCNC: 84 U/L (ref 39–117)
ALT SERPL W P-5'-P-CCNC: 10 U/L (ref 1–33)
ANION GAP SERPL CALCULATED.3IONS-SCNC: 11.4 MMOL/L (ref 5–15)
AST SERPL-CCNC: 14 U/L (ref 1–32)
BILIRUB SERPL-MCNC: 0.2 MG/DL (ref 0–1.2)
BUN SERPL-MCNC: 5 MG/DL (ref 8–23)
BUN/CREAT SERPL: 5.6 (ref 7–25)
CALCIUM SPEC-SCNC: 8.8 MG/DL (ref 8.6–10.5)
CHLORIDE SERPL-SCNC: 102 MMOL/L (ref 98–107)
CHOLEST SERPL-MCNC: 138 MG/DL (ref 0–200)
CO2 SERPL-SCNC: 25.6 MMOL/L (ref 22–29)
CREAT SERPL-MCNC: 0.89 MG/DL (ref 0.57–1)
EGFRCR SERPLBLD CKD-EPI 2021: 71.2 ML/MIN/1.73
GLOBULIN UR ELPH-MCNC: 3 GM/DL
GLUCOSE SERPL-MCNC: 99 MG/DL (ref 65–99)
HDLC SERPL-MCNC: 37 MG/DL (ref 40–60)
LDLC SERPL CALC-MCNC: 85 MG/DL (ref 0–100)
LDLC/HDLC SERPL: 2.29 {RATIO}
POTASSIUM SERPL-SCNC: 4.3 MMOL/L (ref 3.5–5.2)
PROT SERPL-MCNC: 6.4 G/DL (ref 6–8.5)
SODIUM SERPL-SCNC: 139 MMOL/L (ref 136–145)
TRIGL SERPL-MCNC: 81 MG/DL (ref 0–150)
VLDLC SERPL-MCNC: 16 MG/DL (ref 5–40)

## 2024-09-27 PROCEDURE — 80053 COMPREHEN METABOLIC PANEL: CPT | Performed by: FAMILY MEDICINE

## 2024-09-27 PROCEDURE — 36415 COLL VENOUS BLD VENIPUNCTURE: CPT | Performed by: FAMILY MEDICINE

## 2024-09-27 PROCEDURE — 1159F MED LIST DOCD IN RCRD: CPT | Performed by: FAMILY MEDICINE

## 2024-09-27 PROCEDURE — 80061 LIPID PANEL: CPT | Performed by: FAMILY MEDICINE

## 2024-09-27 PROCEDURE — G0439 PPPS, SUBSEQ VISIT: HCPCS | Performed by: FAMILY MEDICINE

## 2024-09-27 PROCEDURE — 1126F AMNT PAIN NOTED NONE PRSNT: CPT | Performed by: FAMILY MEDICINE

## 2024-09-27 PROCEDURE — G0008 ADMIN INFLUENZA VIRUS VAC: HCPCS | Performed by: FAMILY MEDICINE

## 2024-09-27 PROCEDURE — 1160F RVW MEDS BY RX/DR IN RCRD: CPT | Performed by: FAMILY MEDICINE

## 2024-09-27 PROCEDURE — 90662 IIV NO PRSV INCREASED AG IM: CPT | Performed by: FAMILY MEDICINE

## 2024-09-27 RX ORDER — AZATHIOPRINE 50 MG/1
1 TABLET ORAL EVERY 12 HOURS SCHEDULED
COMMUNITY
Start: 2024-07-16

## 2024-09-27 RX ORDER — PREDNISONE 10 MG/1
10 TABLET ORAL DAILY
COMMUNITY

## 2024-09-27 RX ORDER — ALENDRONATE SODIUM 35 MG/1
35 TABLET ORAL
COMMUNITY
Start: 2024-09-26

## 2024-09-27 RX ORDER — LEVOTHYROXINE SODIUM 88 UG/1
88 TABLET ORAL DAILY
Qty: 90 TABLET | Refills: 1 | Status: SHIPPED | OUTPATIENT
Start: 2024-09-27

## 2024-09-27 NOTE — PROGRESS NOTES
..  Venipuncture Blood Specimen Collection  Venipuncture performed in LT arm by Barb Sims MA with good hemostasis. Patient tolerated the procedure well without complications.   09/27/24   Barb Sims MA

## 2024-09-27 NOTE — PROGRESS NOTES
Subjective   The ABCs of the Annual Wellness Visit  Medicare Wellness Visit      Nora Bone is a 69 y.o. patient who presents for a Medicare Wellness Visit.    The following portions of the patient's history were reviewed and   updated as appropriate: She  has a past medical history of Anemia, Anxiety, Asthma, Back pain, Cervical cancer, COPD (chronic obstructive pulmonary disease), Deep vein thrombosis, Disease of thyroid gland, Diverticulosis, Fecal incontinence, Hearing loss, Hyperlipidemia, Hypertension, Muscle weakness, Peripheral vascular disease, and Polymyositis.  She does not have any pertinent problems on file.  She  has a past surgical history that includes Vein Surgery (Right); Total vaginal hysterectomy; Colonoscopy w/ biopsies; Dilation and curettage of uterus; Muscle biopsy (Right, 02/26/2024); Carpal tunnel release (Bilateral); Interstim Stages 1 and 2 Lead and Generator Placement (N/A, 4/19/2024); and Interstim Stages 1 and 2 Lead and Generator Placement (N/A, 5/3/2024).  Her family history includes Breast cancer in her paternal aunt and sister; Epilepsy in her father; Heart disease in her mother; Ovarian cancer in her mother; Thyroid disease in her mother.  She  reports that she quit smoking about 9 years ago. Her smoking use included cigarettes. She started smoking about 29 years ago. She has a 20 pack-year smoking history. She has never been exposed to tobacco smoke. She has never used smokeless tobacco. She reports current drug use. Drug: Marijuana. She reports that she does not drink alcohol.  Current Outpatient Medications   Medication Sig Dispense Refill    albuterol sulfate  (90 Base) MCG/ACT inhaler Inhale 2 puffs Every 6 (Six) Hours As Needed for Wheezing. 18 g 3    alendronate (FOSAMAX) 35 MG tablet Take 1 tablet by mouth Every 7 (Seven) Days.      amitriptyline (ELAVIL) 25 MG tablet TAKE 1 TABLET BY MOUTH EVERY NIGHT AT BEDTIME 90 tablet 1    azaTHIOprine (IMURAN) 50 MG tablet  "Take 1 tablet by mouth Every 12 (Twelve) Hours.      B-D 3CC LUER-LUCINA SYR 25GX1\" 25G X 1\" 3 ML misc use once monthly for b12 injection 3 each 0    budesonide-formoterol (SYMBICORT) 160-4.5 MCG/ACT inhaler Inhale 2 puffs 2 (Two) Times a Day. 10.2 g 2    Calcium Carb-Cholecalciferol (calcium 500 mg vitamin D 5 mcg, 200 UT,) 500-5 MG-MCG tablet per tablet TAKE 1 TABLET BY MOUTH TWICE DAILY 180 tablet 1    cyanocobalamin 1000 MCG/ML injection Inject 1 mL into the appropriate muscle as directed by prescriber Every 28 (Twenty-Eight) Days. 3 mL 3    Dietary Management Product (Vasculera) tablet Take 1 each by mouth Daily for 360 days. 30 each 11    DULoxetine (CYMBALTA) 30 MG capsule TAKE 1 CAPSULE BY MOUTH EVERY DAY 30 capsule 1    fluticasone (FLONASE) 50 MCG/ACT nasal spray USE ONE SPRAY IN EACH NOSTRIL EVERY DAY 48 g 5    levocetirizine (XYZAL) 5 MG tablet Take 1 tablet by mouth Every Evening. 90 tablet 1    levothyroxine (SYNTHROID, LEVOTHROID) 88 MCG tablet Take 1 tablet by mouth Daily. 90 tablet 1    lidocaine (Lidoderm) 5 % Place 3 patches on the skin as directed by provider Daily. Remove & Discard patch within 12 hours or as directed by MD 90 patch 1    ondansetron (ZOFRAN) 4 MG tablet Take 1 tablet by mouth 4 (Four) Times a Day As Needed for Nausea or Vomiting. 30 tablet 1    pantoprazole (PROTONIX) 40 MG EC tablet Take 1 tablet by mouth Daily. 30 tablet 1    polyethylene glycol (MIRALAX) 17 g packet Take 17 g by mouth Daily. 3 packet 0    predniSONE (DELTASONE) 10 MG tablet Take 4 tablets by mouth Daily.      Probiotic Product (Align) 4 MG capsule TAKE 1 CAPSULE BY MOUTH EVERY DAY 90 capsule 1    predniSONE (DELTASONE) 10 MG tablet Take 1 tablet by mouth Daily. Take 2.5 tablets daily       No current facility-administered medications for this visit.     Current Outpatient Medications on File Prior to Visit   Medication Sig    albuterol sulfate  (90 Base) MCG/ACT inhaler Inhale 2 puffs Every 6 (Six) Hours " "As Needed for Wheezing.    alendronate (FOSAMAX) 35 MG tablet Take 1 tablet by mouth Every 7 (Seven) Days.    amitriptyline (ELAVIL) 25 MG tablet TAKE 1 TABLET BY MOUTH EVERY NIGHT AT BEDTIME    azaTHIOprine (IMURAN) 50 MG tablet Take 1 tablet by mouth Every 12 (Twelve) Hours.    B-D 3CC LUER-LUCINA SYR 25GX1\" 25G X 1\" 3 ML misc use once monthly for b12 injection    budesonide-formoterol (SYMBICORT) 160-4.5 MCG/ACT inhaler Inhale 2 puffs 2 (Two) Times a Day.    Calcium Carb-Cholecalciferol (calcium 500 mg vitamin D 5 mcg, 200 UT,) 500-5 MG-MCG tablet per tablet TAKE 1 TABLET BY MOUTH TWICE DAILY    cyanocobalamin 1000 MCG/ML injection Inject 1 mL into the appropriate muscle as directed by prescriber Every 28 (Twenty-Eight) Days.    Dietary Management Product (Vasculera) tablet Take 1 each by mouth Daily for 360 days.    DULoxetine (CYMBALTA) 30 MG capsule TAKE 1 CAPSULE BY MOUTH EVERY DAY    fluticasone (FLONASE) 50 MCG/ACT nasal spray USE ONE SPRAY IN EACH NOSTRIL EVERY DAY    levocetirizine (XYZAL) 5 MG tablet Take 1 tablet by mouth Every Evening.    lidocaine (Lidoderm) 5 % Place 3 patches on the skin as directed by provider Daily. Remove & Discard patch within 12 hours or as directed by MD    ondansetron (ZOFRAN) 4 MG tablet Take 1 tablet by mouth 4 (Four) Times a Day As Needed for Nausea or Vomiting.    pantoprazole (PROTONIX) 40 MG EC tablet Take 1 tablet by mouth Daily.    polyethylene glycol (MIRALAX) 17 g packet Take 17 g by mouth Daily.    predniSONE (DELTASONE) 10 MG tablet Take 4 tablets by mouth Daily.    Probiotic Product (Align) 4 MG capsule TAKE 1 CAPSULE BY MOUTH EVERY DAY    [DISCONTINUED] levothyroxine (SYNTHROID, LEVOTHROID) 88 MCG tablet TAKE 1 TABLET BY MOUTH EVERY DAY    predniSONE (DELTASONE) 10 MG tablet Take 1 tablet by mouth Daily. Take 2.5 tablets daily     No current facility-administered medications on file prior to visit.     She is allergic to hydrocodone-acetaminophen, pollen extract, " "azathioprine, molds & smuts, and percocet [oxycodone-acetaminophen]..    Compared to one year ago, the patient's physical   health is the same.  Compared to one year ago, the patient's mental   health is the same.    Recent Hospitalizations:  She was not admitted to the hospital during the last year.     Current Medical Providers:  Patient Care Team:  Jarrod Simms MD as PCP - General (Family Medicine)    Outpatient Medications Prior to Visit   Medication Sig Dispense Refill    albuterol sulfate  (90 Base) MCG/ACT inhaler Inhale 2 puffs Every 6 (Six) Hours As Needed for Wheezing. 18 g 3    alendronate (FOSAMAX) 35 MG tablet Take 1 tablet by mouth Every 7 (Seven) Days.      amitriptyline (ELAVIL) 25 MG tablet TAKE 1 TABLET BY MOUTH EVERY NIGHT AT BEDTIME 90 tablet 1    azaTHIOprine (IMURAN) 50 MG tablet Take 1 tablet by mouth Every 12 (Twelve) Hours.      B-D 3CC LUER-LUCINA SYR 25GX1\" 25G X 1\" 3 ML misc use once monthly for b12 injection 3 each 0    budesonide-formoterol (SYMBICORT) 160-4.5 MCG/ACT inhaler Inhale 2 puffs 2 (Two) Times a Day. 10.2 g 2    Calcium Carb-Cholecalciferol (calcium 500 mg vitamin D 5 mcg, 200 UT,) 500-5 MG-MCG tablet per tablet TAKE 1 TABLET BY MOUTH TWICE DAILY 180 tablet 1    cyanocobalamin 1000 MCG/ML injection Inject 1 mL into the appropriate muscle as directed by prescriber Every 28 (Twenty-Eight) Days. 3 mL 3    Dietary Management Product (Vasculera) tablet Take 1 each by mouth Daily for 360 days. 30 each 11    DULoxetine (CYMBALTA) 30 MG capsule TAKE 1 CAPSULE BY MOUTH EVERY DAY 30 capsule 1    fluticasone (FLONASE) 50 MCG/ACT nasal spray USE ONE SPRAY IN EACH NOSTRIL EVERY DAY 48 g 5    levocetirizine (XYZAL) 5 MG tablet Take 1 tablet by mouth Every Evening. 90 tablet 1    lidocaine (Lidoderm) 5 % Place 3 patches on the skin as directed by provider Daily. Remove & Discard patch within 12 hours or as directed by MD 90 patch 1    ondansetron (ZOFRAN) 4 MG tablet Take 1 " tablet by mouth 4 (Four) Times a Day As Needed for Nausea or Vomiting. 30 tablet 1    pantoprazole (PROTONIX) 40 MG EC tablet Take 1 tablet by mouth Daily. 30 tablet 1    polyethylene glycol (MIRALAX) 17 g packet Take 17 g by mouth Daily. 3 packet 0    predniSONE (DELTASONE) 10 MG tablet Take 4 tablets by mouth Daily.      Probiotic Product (Align) 4 MG capsule TAKE 1 CAPSULE BY MOUTH EVERY DAY 90 capsule 1    levothyroxine (SYNTHROID, LEVOTHROID) 88 MCG tablet TAKE 1 TABLET BY MOUTH EVERY DAY 30 tablet 1    predniSONE (DELTASONE) 10 MG tablet Take 1 tablet by mouth Daily. Take 2.5 tablets daily       No facility-administered medications prior to visit.     No opioid medication identified on active medication list. I have reviewed chart for other potential  high risk medication/s and harmful drug interactions in the elderly.      Aspirin is not on active medication list.  Aspirin use is not indicated based on review of current medical condition/s. Risk of harm outweighs potential benefits.  .    Patient Active Problem List   Diagnosis    Anemia    Anxiety    Arthritis    Asthma    Cancer    Family history of malignant neoplasm of digestive organs    GERD (gastroesophageal reflux disease)    Hypothyroidism    Migraines    Rheumatic fever    Seasonal allergic rhinitis    Skin lesion    Varicose veins of bilateral lower extremities with pain    Venous insufficiency (chronic) (peripheral)    Elevated CK    Bartholin's gland cyst    Kidney stones    Leg swelling    Lung disease    Hyperthyroidism    Bilateral carpal tunnel syndrome    Former tobacco use    Muscle weakness    Full incontinence of feces     Advance Care Planning Advance Directive is not on file.  ACP discussion was held with the patient during this visit. Patient has an advance directive (not in EMR), copy requested.            Objective   Vitals:    09/27/24 0938   BP: 120/80   Pulse: 85   Temp: 97.7 °F (36.5 °C)   SpO2: 95%   Weight: 56.7 kg (124 lb 14.4  "oz)   Height: 160 cm (63\")   PainSc: 0-No pain       Estimated body mass index is 22.13 kg/m² as calculated from the following:    Height as of this encounter: 160 cm (63\").    Weight as of this encounter: 56.7 kg (124 lb 14.4 oz).    BMI is within normal parameters. No other follow-up for BMI required.       Does the patient have evidence of cognitive impairment? No                                                                                                Health  Risk Assessment    Smoking Status:  Social History     Tobacco Use   Smoking Status Former    Current packs/day: 0.00    Average packs/day: 1 pack/day for 20.0 years (20.0 ttl pk-yrs)    Types: Cigarettes    Start date: 1995    Quit date: 2015    Years since quittin.7    Passive exposure: Never   Smokeless Tobacco Never     Alcohol Consumption:  Social History     Substance and Sexual Activity   Alcohol Use Never       Fall Risk Screen  STEADI Fall Risk Assessment was completed, and patient is at LOW risk for falls.Assessment completed on:2024    Depression Screenin/27/2024     9:00 AM   PHQ-2/PHQ-9 Depression Screening   Little Interest or Pleasure in Doing Things 0-->not at all   Feeling Down, Depressed or Hopeless 0-->not at all   PHQ-9: Brief Depression Severity Measure Score 0     Health Habits and Functional and Cognitive Screenin/27/2024     9:00 AM   Functional & Cognitive Status   Do you have difficulty preparing food and eating? No   Do you have difficulty bathing yourself, getting dressed or grooming yourself? No   Do you have difficulty using the toilet? No   Do you have difficulty moving around from place to place? No   Do you have trouble with steps or getting out of a bed or a chair? No   Current Diet Well Balanced Diet   Dental Exam Not up to date   Eye Exam Not up to date   Exercise (times per week) 0 times per week   Current Exercises Include No Regular Exercise   Do you need help using the phone?  " No   Are you deaf or do you have serious difficulty hearing?  Yes   Do you need help to go to places out of walking distance? No   Do you need help shopping? No   Do you need help preparing meals?  No   Do you need help with housework?  No   Do you need help with laundry? No   Do you need help taking your medications? No   Do you need help managing money? No   Do you ever drive or ride in a car without wearing a seat belt? No   Have you felt unusual stress, anger or loneliness in the last month? No   Who do you live with? Alone   If you need help, do you have trouble finding someone available to you? No   Have you been bothered in the last four weeks by sexual problems? No   Do you have difficulty concentrating, remembering or making decisions? No           Age-appropriate Screening Schedule:  Refer to the list below for future screening recommendations based on patient's age, sex and/or medical conditions. Orders for these recommended tests are listed in the plan section. The patient has been provided with a written plan.    Health Maintenance List  Health Maintenance   Topic Date Due    ZOSTER VACCINE (1 of 2) Never done    COVID-19 Vaccine (3 - 2023-24 season) 09/01/2024    INFLUENZA VACCINE  08/01/2024    COLORECTAL CANCER SCREENING  11/01/2024    LUNG CANCER SCREENING  11/14/2024    MAMMOGRAM  11/15/2024    DXA SCAN  11/15/2024    LIPID PANEL  03/11/2025    ANNUAL WELLNESS VISIT  09/27/2025    TDAP/TD VACCINES (2 - Td or Tdap) 09/27/2028    HEPATITIS C SCREENING  Completed    Pneumococcal Vaccine 65+  Completed                                                                                                                                                CMS Preventative Services Quick Reference  Risk Factors Identified During Encounter  Immunizations Discussed/Encouraged: Influenza and Shingrix  Inactivity/Sedentary: Patient was advised to exercise at least 150 minutes a week per CDC recommendations.    The above  "risks/problems have been discussed with the patient.  Pertinent information has been shared with the patient in the After Visit Summary.  An After Visit Summary and PPPS were made available to the patient.    Follow Up:   Next Medicare Wellness visit to be scheduled in 1 year.         Additional E&M Note during same encounter follows:  Patient has additional, significant, and separately identifiable condition(s)/problem(s) that require work above and beyond the Medicare Wellness Visit     Chief Complaint  Medicare Wellness-subsequent and Hypothyroidism    Subjective   HPI                  Objective   Vital Signs:  /80   Pulse 85   Temp 97.7 °F (36.5 °C)   Ht 160 cm (63\")   Wt 56.7 kg (124 lb 14.4 oz)   SpO2 95%   BMI 22.13 kg/m²   Physical Exam    The following data was reviewed by: Jarrod iSmms MD on 09/27/2024:        CMP          7/15/2024    09:54 7/18/2024    10:57 8/30/2024    12:57   CMP   Glucose 100  89  85    BUN 22  22  9    Creatinine 1.13  1.51  1.18    EGFR 53.4  37.7  50.7    Sodium 139  137  141    Potassium 4.5  4.4  3.9    Chloride 104  102  105    Calcium 8.9  9.2  9.7    Total Protein 6.1  6.6  7.2    Albumin 3.9  4.1  4.4    Globulin 2.2  2.5  2.8    Total Bilirubin 0.2  0.3  0.3    Alkaline Phosphatase 111  110  106    AST (SGOT) 45  41  28    ALT (SGPT) 91  73  19    Albumin/Globulin Ratio 1.8  1.6  1.6    BUN/Creatinine Ratio 19.5  14.6  7.6    Anion Gap 12.0  10.0  10.8      CBC          3/11/2024    11:39 7/11/2024    09:19 8/30/2024    12:57   CBC   WBC 8.65  11.25  10.65    RBC 5.19  5.06  5.32    Hemoglobin 15.4  15.3  15.7    Hematocrit 45.4  45.0  48.1    MCV 87.5  88.9  90.4    MCH 29.7  30.2  29.5    MCHC 33.9  34.0  32.6    RDW 13.7  15.1  13.6    Platelets 260  224  170      Lipid Panel          3/11/2024    11:39   Lipid Panel   Total Cholesterol 245    Triglycerides 174    HDL Cholesterol 54    VLDL Cholesterol 32    LDL Cholesterol  159    LDL/HDL Ratio 2.89  "     TSH          3/11/2024    11:39 5/20/2024    14:47 6/25/2024    13:34   TSH   TSH 0.250  0.162  1.730            Assessment and Plan Additional age appropriate preventative wellness advice topics were discussed during today's preventative wellness exam(some topics already addressed during AWV portion of the note above):   Nutrition: Discussed nutrition plan with patient. Information shared in after visit summary. Goal is for a well balanced diet to enhance overall health.              Need for influenza vaccination    Hypothyroidism, unspecified type    Mixed hyperlipidemia   Lipid abnormalities are stable    Plan:  Continue same medication/s without change.      Discussed medication dosage, use, side effects, and goals of treatment in detail.    Counseled patient on lifestyle modifications to help control hyperlipidemia.     Patient Treatment Goals:   LDL goal is less than 70    Followup in 6 months.  Encounter for screening mammogram for malignant neoplasm of breast    Screening for lung cancer    Personal history of nicotine dependence    Medicare annual wellness visit, subsequent      Orders Placed This Encounter   Procedures     CT Chest Low Dose Cancer Screening WO     Standing Status:   Future     Standing Expiration Date:   9/27/2025     Order Specific Question:   The patient is age 50-80 (Medicare coverage 50-77)     Answer:   69     Order Specific Question:   The patient is a current smoker?     Answer:   No     Order Specific Question:   Is the patient a former smoker who has quit within the last 15 years? (If the answer to this is no they do not meet criteria for this exam)     Answer:   Yes     Order Specific Question:   The number of years since quitting smoking.     Answer:   9     Order Specific Question:   Does the patient have a smoking history of 20 pack-years or greater? (If the answer to this is no they do not meet criteria for this exam)     Answer:   Yes     Order Specific Question:    Actual pack - year smoking history (number):     Answer:   20     Order Specific Question:   Has the Patient had a Chest CT scan within the past 12 months?     Answer:   No     Order Specific Question:   Does the patient have any clinical signs/symptoms of lung cancer?     Answer:   No     Order Specific Question:   The patient was engaged in shared decision-making for this test:     Answer:   Yes    Mammo Screening Digital Tomosynthesis Bilateral With CAD     Standing Status:   Future     Standing Expiration Date:   9/27/2025     Order Specific Question:   Reason for Exam:     Answer:   breast cancer screen     Order Specific Question:   Release to patient     Answer:   Routine Release [[id number]]    Fluzone High-Dose 65+yrs    Comprehensive Metabolic Panel     Order Specific Question:   Release to patient     Answer:   Routine Release [[id number]]    Lipid Panel     Order Specific Question:   Release to patient     Answer:   Routine Release [[id number]]     New Medications Ordered This Visit   Medications    levothyroxine (SYNTHROID, LEVOTHROID) 88 MCG tablet     Sig: Take 1 tablet by mouth Daily.     Dispense:  90 tablet     Refill:  1     This prescription was filled on 8/26/2024. Any refills authorized will be placed on file.          Follow Up   Return in about 6 months (around 3/27/2025) for Recheck.  Patient was given instructions and counseling regarding her condition or for health maintenance advice. Please see specific information pulled into the AVS if appropriate.

## 2024-09-30 NOTE — PROGRESS NOTES
Labs are improving.  Continue current treatments and continue to watch diet and exercise.  Follow-up as needed.

## 2024-10-15 DIAGNOSIS — R10.13 EPIGASTRIC PAIN: ICD-10-CM

## 2024-10-15 RX ORDER — PANTOPRAZOLE SODIUM 40 MG/1
40 TABLET, DELAYED RELEASE ORAL DAILY
Qty: 30 TABLET | Refills: 1 | Status: SHIPPED | OUTPATIENT
Start: 2024-10-15

## 2024-10-19 NOTE — PROGRESS NOTES
Chief Complaint     Elevated Hepatic Enzymes and Colonoscopy    History of Present Illness     Nora Bone is a 69 y.o. female who presents to [facility] on referral from Jarrod Simms MD for a gastroenterology evaluation of elevated liver enzymes.      History of Present Illness:  She reports prior history of bowel an bladder and Dr. Dowd placed a sacral neuromodulation called [employer] therapy. Patient explains she is here to schedule her colonoscopy. Initially her PCP scheduled the appointment for elevated LFTs but on the past 2 repeat checks they are within normal limits.   Denies RUQ pain, alcohol use, IV drug use, unprofessional tattoos, history of or exposure to hepatitis, and family history of liver disease. After her son's birth she did receive a blood tranfusion over 42 years ago.     She reports having 1 BM a day, stool described a pellets and hard. Reports seeing blood if she is straining, but explains it seldom occurs. No reports of melena. She reports LLQ pain, that occurs a couple times a week, described as a pinched and sharp pain that last a couple of minutes. Alleviated with movement.     She take 2-4 steroids a day and explains she has nausea and vomiting that she relates to the steroid use. Occasionally will take the Zofran but it doesn't help the nausea. Reports heartburn 1-2 times a month, for which she will start a clear liquid diet until it resolve. Patient has trouble swallowing 203 times a week with liquids and solids. Reports it getting stuck and she will have to regurgitate it to clear. She reports sometimes it is painful to swallowing. Denies unintentional weight loss.     Patient has a positive family history of colon cancer, found in her father  at 54. Patient's last EGD/Colonoscopy 2019 performed by Dr Duff. Finding of polyps and severe diverticulosis of the sigmoid colon.     History      Past Medical History:   Diagnosis Date     Anemia     NO CURRENT ISSUES GETS MONTHLY SHOTS    Anxiety     Asthma     INHALER, CONTROLLED    Back pain     OCCASSIONAL    Cervical cancer     COPD (chronic obstructive pulmonary disease)     no O2 use    Deep vein thrombosis     NO CURRENT ISSUES NO THINNERS    Disease of thyroid gland     Diverticulosis     Fecal incontinence     Hearing loss     Hyperlipidemia     Hypertension     NO MEDS    Muscle weakness     detereration    Peripheral vascular disease     WEARS SUPPORT HOSE/R VARICOSE VEINS    Polymyositis     FOLLOWS BROCK, DAILY PREDNISONE       Past Surgical History:   Procedure Laterality Date    CARPAL TUNNEL RELEASE Bilateral     COLONOSCOPY W/ BIOPSIES      DILATATION AND CURETTAGE      INTERSTIM PLACEMENT N/A 4/19/2024    Procedure: basic peripheral nerve evaluation;  Surgeon: Roque Dowd MD;  Location: [facility] OR [facility];  Service: General;  Laterality: N/A;    INTERSTIM PLACEMENT N/A 5/3/2024    Procedure: INTERSTIM STAGES 1 AND 2 LEAD AND GENERATOR PLACEMENT, full implant procedure;  Surgeon: Roque Dowd MD;  Location: [facility] OR [facility];  Service: General;  Laterality: N/A;    MUSCLE BIOPSY Right 02/26/2024    Procedure: MUSCLE BIOPSY right thigh;  Surgeon: Roque Dowd MD;  Location: [facility] OR [facility];  Service: General;  Laterality: Right;    VAGINAL HYSTERECTOMY      VEIN SURGERY Right     STRIPPING       Family History   Problem Relation Age of Onset    Ovarian cancer Mother     Heart disease Mother     Thyroid disease Mother     Epilepsy Father     Breast cancer Sister     Breast cancer Paternal Aunt     Malig Hyperthermia Neg Hx         Current Medications        Current Outpatient Medications:     albuterol sulfate  (90 Base) MCG/ACT inhaler, Inhale 2 puffs Every 6 (Six) Hours As Needed for Wheezing., Disp: 18 g, Rfl: 3    alendronate (FOSAMAX) 35 MG tablet, Take 1 tablet by mouth Every 7 (Seven) Days., Disp: , Rfl:     amitriptyline (ELAVIL) 25 MG tablet,  "TAKE 1 TABLET BY MOUTH EVERY NIGHT AT BEDTIME, Disp: 90 tablet, Rfl: 1    B-D 3CC LUER-LUCINA SYR 25GX1\" 25G X 1\" 3 ML misc, use once monthly for b12 injection, Disp: 3 each, Rfl: 0    budesonide-formoterol (SYMBICORT) 160-4.5 MCG/ACT inhaler, Inhale 2 puffs 2 (Two) Times a Day., Disp: 10.2 g, Rfl: 2    Calcium Carb-Cholecalciferol (calcium 500 mg vitamin D 5 mcg, 200 UT,) 500-5 MG-MCG tablet per tablet, TAKE 1 TABLET BY MOUTH TWICE DAILY, Disp: 180 tablet, Rfl: 1    cyanocobalamin 1000 MCG/ML injection, Inject 1 mL into the appropriate muscle as directed by prescriber Every 28 (Twenty-Eight) Days., Disp: 3 mL, Rfl: 3    Dietary Management Product (Vasculera) tablet, Take 1 each by mouth Daily for 360 days., Disp: 30 each, Rfl: 11    DULoxetine (CYMBALTA) 30 MG capsule, TAKE 1 CAPSULE BY MOUTH EVERY DAY, Disp: 30 capsule, Rfl: 1    fluticasone (FLONASE) 50 MCG/ACT nasal spray, USE ONE SPRAY IN EACH NOSTRIL EVERY DAY, Disp: 48 g, Rfl: 5    levothyroxine (SYNTHROID, LEVOTHROID) 88 MCG tablet, Take 1 tablet by mouth Daily., Disp: 90 tablet, Rfl: 1    lidocaine (Lidoderm) 5 %, Place 3 patches on the skin as directed by provider Daily. Remove & Discard patch within 12 hours or as directed by MD, Disp: 90 patch, Rfl: 1    ondansetron (ZOFRAN) 4 MG tablet, Take 1 tablet by mouth 4 (Four) Times a Day As Needed for Nausea or Vomiting., Disp: 30 tablet, Rfl: 1    polyethylene glycol (MIRALAX) 17 g packet, Take 17 g by mouth Daily., Disp: 3 packet, Rfl: 0    predniSONE (DELTASONE) 10 MG tablet, Take 4 tablets by mouth Daily., Disp: , Rfl:     azaTHIOprine (IMURAN) 50 MG tablet, Take 1 tablet by mouth Every 12 (Twelve) Hours. (Patient not taking: Reported on 10/21/2024), Disp: , Rfl:     levocetirizine (XYZAL) 5 MG tablet, Take 1 tablet by mouth Every Evening. (Patient not taking: Reported on 10/21/2024), Disp: 90 tablet, Rfl: 1    pantoprazole (PROTONIX) 40 MG EC tablet, TAKE 1 TABLET BY MOUTH EVERY DAY (Patient not taking: " Reported on 10/21/2024), Disp: 30 tablet, Rfl: 1    predniSONE (DELTASONE) 10 MG tablet, Take 1 tablet by mouth Daily. Take 2.5 tablets daily, Disp: , Rfl:     Probiotic Product (Align) 4 MG capsule, TAKE 1 CAPSULE BY MOUTH EVERY DAY (Patient not taking: Reported on 10/21/2024), Disp: 90 capsule, Rfl: 1    Sodium Sulfate-Mag Sulfate-KCl (Sutab) 1759-509-546 MG tablet, Take 12 tablets by mouth Every 12 (Twelve) Hours for 1 day. Take per office instructions, Disp: 24 tablet, Rfl: 0     Allergies     Allergies   Allergen Reactions    Hydrocodone-Acetaminophen Shortness Of Breath and Nausea And Vomiting     Also reports shaking       Pollen Extract Shortness Of Breath    Azathioprine Nausea And Vomiting    Molds & Smuts Rash    Percocet [Oxycodone-Acetaminophen] Other (See Comments)     PT REQUEST PERCOCET BE ADDED TO ALLERGY LIST/PT CANNOT SLEEP WHILE TAKING THIS MEDICINE        Social History       Social History     Social History Narrative    Not on file       Immunizations     Immunization:  Immunization History   Administered Date(s) Administered    ABRYSVO (RSV, 60+ or pregnant women 32-36 wks) 01/06/2024    COVID-19 (MODERNA) 1st,2nd,3rd Dose Monovalent 08/20/2021, 09/17/2021    Flu Vaccine Quad PF >36MO 02/07/2017, 09/27/2018, 10/15/2019, 01/12/2021    Fluzone (or Fluarix & Flulaval for VFC) >6mos 02/07/2017, 10/28/2021    Fluzone High-Dose 65+YRS 09/27/2024    Fluzone High-Dose 65+yrs 11/04/2022    Hepatitis A 03/13/2018, 09/13/2018    Pneumococcal Conjugate 20-Valent (PCV20) 09/12/2023    Pneumococcal Polysaccharide (PPSV23) 10/28/2021    Tdap 09/27/2018          Objective     Objective     Vital Signs:   BP (!) 137/101 (BP Location: Left arm, Patient Position: Sitting, Cuff Size: Adult)   Pulse 51   Wt 55.4 kg (122 lb 3.2 oz)   SpO2 95%   BMI 21.65 kg/m²       Physical Exam  HENT:      Head: Normocephalic.   Eyes:      Pupils: Pupils are equal, round, and reactive to light.   Cardiovascular:      Rate  "and Rhythm: Normal rate.   Pulmonary:      Effort: Pulmonary effort is normal.   Abdominal:      General: Bowel sounds are normal.   Musculoskeletal:         General: Normal range of motion.   Neurological:      General: No focal deficit present.      Mental Status: She is alert.   Psychiatric:         Mood and Affect: Mood normal.         Results      Result Review :   The following data was reviewed by: DEANA Boateng on 10/21/2024:    Lab Results - Last 18 Months   Lab Units 08/30/24  1257 07/11/24  0919 03/11/24  1139   WBC 10*3/mm3 10.65 11.25* 8.65   HEMOGLOBIN g/dL 15.7 15.3 15.4   MCV fL 90.4 88.9 87.5   PLATELETS 10*3/mm3 170 224 260         Lab Results - Last 18 Months   Lab Units 09/27/24  1004 08/30/24  1257 07/18/24  1057   BUN mg/dL 5* 9 22   CREATININE mg/dL 0.89 1.18* 1.51*   SODIUM mmol/L 139 141 137   POTASSIUM mmol/L 4.3 3.9 4.4   CHLORIDE mmol/L 102 105 102   CO2 mmol/L 25.6 25.2 25.0   GLUCOSE mg/dL 99 85 89      No results for input(s): \"PROTIME\", \"INR\", \"PTT\" in the last 85494 hours.  Lab Results - Last 18 Months   Lab Units 09/27/24  1004 08/30/24  1257 07/18/24  1057   AST (SGOT) U/L 14 28 41*   ALT (SGPT) U/L 10 19 73*   ALK PHOS U/L 84 106 110   BILIRUBIN mg/dL 0.2 0.3 0.3   TOTAL PROTEIN g/dL 6.4 7.2 6.6   ALBUMIN g/dL 3.4* 4.4 4.1      Lab Results - Last 18 Months   Lab Units 04/28/23  1317   VITAMIN B 12 pg/mL 1,918*   FOLATE ng/mL 10.40     No results for input(s): \"HAV\", \"HEPAIGM\", \"HEPBIGM\", \"HEPBCAB\", \"HBEAG\", \"HEPCAB\" in the last 65075 hours.    CT Abdomen Pelvis With Contrast    Result Date: 7/11/2024  Impression: 1.No acute process identified Electronically Signed: Oliver Caraballo MD  7/11/2024 4:18 PM EDT  Workstation ID: [id number]         Assessment and Plan        Assessment and Plan    Diagnoses and all orders for this visit:    1. Polymyositis (Primary)    2. History of colonic polyps  -     Case Request; Standing  -     Follow Anesthesia Guidelines / Protocol; " Future  -     Case Request  -     Sodium Sulfate-Mag Sulfate-KCl (Sutab) 1775-262-535 MG tablet; Take 12 tablets by mouth Every 12 (Twelve) Hours for 1 day. Take per office instructions  Dispense: 24 tablet; Refill: 0    3. Diverticulosis  -     Case Request; Standing  -     Follow Anesthesia Guidelines / Protocol; Future  -     Case Request  -     Sodium Sulfate-Mag Sulfate-KCl (Sutab) 2565-768-904 MG tablet; Take 12 tablets by mouth Every 12 (Twelve) Hours for 1 day. Take per office instructions  Dispense: 24 tablet; Refill: 0    4. History of diverticulitis  -     Case Request; Standing  -     Follow Anesthesia Guidelines / Protocol; Future  -     Case Request  -     Sodium Sulfate-Mag Sulfate-KCl (Sutab) 2198-182-522 MG tablet; Take 12 tablets by mouth Every 12 (Twelve) Hours for 1 day. Take per office instructions  Dispense: 24 tablet; Refill: 0    5. Dysphagia, unspecified type  -     Case Request; Standing  -     Follow Anesthesia Guidelines / Protocol; Future  -     Case Request  -     Sodium Sulfate-Mag Sulfate-KCl (Sutab) 2269-459-809 MG tablet; Take 12 tablets by mouth Every 12 (Twelve) Hours for 1 day. Take per office instructions  Dispense: 24 tablet; Refill: 0    6. Nausea and vomiting, unspecified vomiting type  -     Case Request; Standing  -     Follow Anesthesia Guidelines / Protocol; Future  -     Case Request  -     Sodium Sulfate-Mag Sulfate-KCl (Sutab) 4977-585-139 MG tablet; Take 12 tablets by mouth Every 12 (Twelve) Hours for 1 day. Take per office instructions  Dispense: 24 tablet; Refill: 0    7. Heartburn  -     Case Request; Standing  -     Follow Anesthesia Guidelines / Protocol; Future  -     Case Request  -     Sodium Sulfate-Mag Sulfate-KCl (Sutab) 9821-347-296 MG tablet; Take 12 tablets by mouth Every 12 (Twelve) Hours for 1 day. Take per office instructions  Dispense: 24 tablet; Refill: 0    Other orders  -     Follow Anesthesia Guidelines / Protocol; Standing  -     Verify NPO;  Standing  -     Verify Bowel Prep Was Successful; Standing  -     Give Tap Water Enema If Bowel Prep Insufficient; Standing        ESOPHAGOGASTRODUODENOSCOPY AND COLONOSCOPY:A flexible tube that has a camera and light at the end will pass through the oral cavity into the esophagus (food tube), stomach and upper part of the small bowel and another flexible tube with a camera and light at the end will pass from the rear end to visualize the large bowel with possible removal of protruded tissue to send for testing. (N/A), ESOPHAGOGASTRODUODENOSCOPY AND COLONOSCOPY (N/A)      Follow Up        Follow Up   No follow-ups on file.    Colonoscopy and EGD ordered due to history of colonic polyps, and diverticulitis history, dysphagia, and nausea and vomiting. Due to the patient having a sacral neurostimulator by crealytics therapy she is unable to tolerate bowel preps and could only do the sutab and agrees to follow 3 day liquid diet prior to procedure. I have recommended that the patient undergo further evaluation with an EGD and colonoscopy.  I have discussed this procedure in detail with the patient.  I have discussed the risk, benefits and alternatives.  I have discussed the risk of anesthesia, bleeding and perforation.  Patient understands these risks, benefits and alternatives and wishes to proceed.  I will schedule her at her earliest convenience.  Patient agreeable to this plan and will call with any questions or concerns.    Patient was given instructions and counseling regarding her condition or for health maintenance advice. Please see specific information pulled into the AVS if appropriate.

## 2024-10-19 NOTE — H&P (VIEW-ONLY)
Chief Complaint     Elevated Hepatic Enzymes and Colonoscopy    History of Present Illness     Nora Bone is a 69 y.o. female who presents to [facility] on referral from Jarrod Simms MD for a gastroenterology evaluation of elevated liver enzymes.      History of Present Illness:  She reports prior history of bowel an bladder and Dr. Dowd placed a sacral neuromodulation called [employer] therapy. Patient explains she is here to schedule her colonoscopy. Initially her PCP scheduled the appointment for elevated LFTs but on the past 2 repeat checks they are within normal limits.   Denies RUQ pain, alcohol use, IV drug use, unprofessional tattoos, history of or exposure to hepatitis, and family history of liver disease. After her son's birth she did receive a blood tranfusion over 42 years ago.     She reports having 1 BM a day, stool described a pellets and hard. Reports seeing blood if she is straining, but explains it seldom occurs. No reports of melena. She reports LLQ pain, that occurs a couple times a week, described as a pinched and sharp pain that last a couple of minutes. Alleviated with movement.     She take 2-4 steroids a day and explains she has nausea and vomiting that she relates to the steroid use. Occasionally will take the Zofran but it doesn't help the nausea. Reports heartburn 1-2 times a month, for which she will start a clear liquid diet until it resolve. Patient has trouble swallowing 203 times a week with liquids and solids. Reports it getting stuck and she will have to regurgitate it to clear. She reports sometimes it is painful to swallowing. Denies unintentional weight loss.     Patient has a positive family history of colon cancer, found in her father  at 54. Patient's last EGD/Colonoscopy 2019 performed by Dr Duff. Finding of polyps and severe diverticulosis of the sigmoid colon.     History      Past Medical History:   Diagnosis Date     Anemia     NO CURRENT ISSUES GETS MONTHLY SHOTS    Anxiety     Asthma     INHALER, CONTROLLED    Back pain     OCCASSIONAL    Cervical cancer     COPD (chronic obstructive pulmonary disease)     no O2 use    Deep vein thrombosis     NO CURRENT ISSUES NO THINNERS    Disease of thyroid gland     Diverticulosis     Fecal incontinence     Hearing loss     Hyperlipidemia     Hypertension     NO MEDS    Muscle weakness     detereration    Peripheral vascular disease     WEARS SUPPORT HOSE/R VARICOSE VEINS    Polymyositis     FOLLOWS BROCK, DAILY PREDNISONE       Past Surgical History:   Procedure Laterality Date    CARPAL TUNNEL RELEASE Bilateral     COLONOSCOPY W/ BIOPSIES      DILATATION AND CURETTAGE      INTERSTIM PLACEMENT N/A 4/19/2024    Procedure: basic peripheral nerve evaluation;  Surgeon: Roque Dowd MD;  [place] [facility] [place] [facility] [place]  Service: General;  Laterality: N/A;    INTERSTIM PLACEMENT N/A 5/3/2024    Procedure: INTERSTIM STAGES 1 AND 2 LEAD AND GENERATOR PLACEMENT, full implant procedure;  Surgeon: Roque Dowd MD;  [place] [facility] [place] [facility] [place]  Service: General;  Laterality: N/A;    MUSCLE BIOPSY Right 02/26/2024    Procedure: MUSCLE BIOPSY right thigh;  Surgeon: Roque Dowd MD;  [place] [facility] [place];  Service: General;  Laterality: Right;    VAGINAL HYSTERECTOMY      VEIN SURGERY Right     STRIPPING       Family History   Problem Relation Age of Onset    Ovarian cancer Mother     Heart disease Mother     Thyroid disease Mother     Epilepsy Father     Breast cancer Sister     Breast cancer Paternal Aunt     Malig Hyperthermia Neg Hx         Current Medications        Current Outpatient Medications:     albuterol sulfate  (90 Base) MCG/ACT inhaler, Inhale 2 puffs Every 6 (Six) Hours As Needed for Wheezing., Disp: 18 g, Rfl: 3    alendronate (FOSAMAX) 35 MG tablet, Take 1 tablet by mouth Every 7 (Seven) Days., Disp: , Rfl:     amitriptyline (ELAVIL) 25 MG tablet,  "TAKE 1 TABLET BY MOUTH EVERY NIGHT AT BEDTIME, Disp: 90 tablet, Rfl: 1    B-D 3CC LUER-LUCINA SYR 25GX1\" 25G X 1\" 3 ML misc, use once monthly for b12 injection, Disp: 3 each, Rfl: 0    budesonide-formoterol (SYMBICORT) 160-4.5 MCG/ACT inhaler, Inhale 2 puffs 2 (Two) Times a Day., Disp: 10.2 g, Rfl: 2    Calcium Carb-Cholecalciferol (calcium 500 mg vitamin D 5 mcg, 200 UT,) 500-5 MG-MCG tablet per tablet, TAKE 1 TABLET BY MOUTH TWICE DAILY, Disp: 180 tablet, Rfl: 1    cyanocobalamin 1000 MCG/ML injection, Inject 1 mL into the appropriate muscle as directed by prescriber Every 28 (Twenty-Eight) Days., Disp: 3 mL, Rfl: 3    Dietary Management Product (Vasculera) tablet, Take 1 each by mouth Daily for 360 days., Disp: 30 each, Rfl: 11    DULoxetine (CYMBALTA) 30 MG capsule, TAKE 1 CAPSULE BY MOUTH EVERY DAY, Disp: 30 capsule, Rfl: 1    fluticasone (FLONASE) 50 MCG/ACT nasal spray, USE ONE SPRAY IN EACH NOSTRIL EVERY DAY, Disp: 48 g, Rfl: 5    levothyroxine (SYNTHROID, LEVOTHROID) 88 MCG tablet, Take 1 tablet by mouth Daily., Disp: 90 tablet, Rfl: 1    lidocaine (Lidoderm) 5 %, Place 3 patches on the skin as directed by provider Daily. Remove & Discard patch within 12 hours or as directed by MD, Disp: 90 patch, Rfl: 1    ondansetron (ZOFRAN) 4 MG tablet, Take 1 tablet by mouth 4 (Four) Times a Day As Needed for Nausea or Vomiting., Disp: 30 tablet, Rfl: 1    polyethylene glycol (MIRALAX) 17 g packet, Take 17 g by mouth Daily., Disp: 3 packet, Rfl: 0    predniSONE (DELTASONE) 10 MG tablet, Take 4 tablets by mouth Daily., Disp: , Rfl:     azaTHIOprine (IMURAN) 50 MG tablet, Take 1 tablet by mouth Every 12 (Twelve) Hours. (Patient not taking: Reported on 10/21/2024), Disp: , Rfl:     levocetirizine (XYZAL) 5 MG tablet, Take 1 tablet by mouth Every Evening. (Patient not taking: Reported on 10/21/2024), Disp: 90 tablet, Rfl: 1    pantoprazole (PROTONIX) 40 MG EC tablet, TAKE 1 TABLET BY MOUTH EVERY DAY (Patient not taking: " Reported on 10/21/2024), Disp: 30 tablet, Rfl: 1    predniSONE (DELTASONE) 10 MG tablet, Take 1 tablet by mouth Daily. Take 2.5 tablets daily, Disp: , Rfl:     Probiotic Product (Align) 4 MG capsule, TAKE 1 CAPSULE BY MOUTH EVERY DAY (Patient not taking: Reported on 10/21/2024), Disp: 90 capsule, Rfl: 1    Sodium Sulfate-Mag Sulfate-KCl (Sutab) 7019-958-567 MG tablet, Take 12 tablets by mouth Every 12 (Twelve) Hours for 1 day. Take per office instructions, Disp: 24 tablet, Rfl: 0     Allergies     Allergies   Allergen Reactions    Hydrocodone-Acetaminophen Shortness Of Breath and Nausea And Vomiting     Also reports shaking       Pollen Extract Shortness Of Breath    Azathioprine Nausea And Vomiting    Molds & Smuts Rash    Percocet [Oxycodone-Acetaminophen] Other (See Comments)     PT REQUEST PERCOCET BE ADDED TO ALLERGY LIST/PT CANNOT SLEEP WHILE TAKING THIS MEDICINE        Social History       Social History     Social History Narrative    Not on file       Immunizations     Immunization:  Immunization History   Administered Date(s) Administered    ABRYSVO (RSV, 60+ or pregnant women 32-36 wks) 01/06/2024    COVID-19 (MODERNA) 1st,2nd,3rd Dose Monovalent 08/20/2021, 09/17/2021    Flu Vaccine Quad PF >36MO 02/07/2017, 09/27/2018, 10/15/2019, 01/12/2021    Fluzone (or Fluarix & Flulaval for VFC) >6mos 02/07/2017, 10/28/2021    Fluzone High-Dose 65+YRS 09/27/2024    Fluzone High-Dose 65+yrs 11/04/2022    Hepatitis A 03/13/2018, 09/13/2018    Pneumococcal Conjugate 20-Valent (PCV20) 09/12/2023    Pneumococcal Polysaccharide (PPSV23) 10/28/2021    Tdap 09/27/2018          Objective     Objective     Vital Signs:   BP (!) 137/101 (BP Location: Left arm, Patient Position: Sitting, Cuff Size: Adult)   Pulse 51   Wt 55.4 kg (122 lb 3.2 oz)   SpO2 95%   BMI 21.65 kg/m²       Physical Exam  HENT:      Head: Normocephalic.   Eyes:      Pupils: Pupils are equal, round, and reactive to light.   Cardiovascular:      Rate  "and Rhythm: Normal rate.   Pulmonary:      Effort: Pulmonary effort is normal.   Abdominal:      General: Bowel sounds are normal.   Musculoskeletal:         General: Normal range of motion.   Neurological:      General: No focal deficit present.      Mental Status: She is alert.   Psychiatric:         Mood and Affect: Mood normal.         Results      Result Review :   The following data was reviewed by: DEANA Boateng on 10/21/2024:    Lab Results - Last 18 Months   Lab Units 08/30/24  1257 07/11/24  0919 03/11/24  1139   WBC 10*3/mm3 10.65 11.25* 8.65   HEMOGLOBIN g/dL 15.7 15.3 15.4   MCV fL 90.4 88.9 87.5   PLATELETS 10*3/mm3 170 224 260         Lab Results - Last 18 Months   Lab Units 09/27/24  1004 08/30/24  1257 07/18/24  1057   BUN mg/dL 5* 9 22   CREATININE mg/dL 0.89 1.18* 1.51*   SODIUM mmol/L 139 141 137   POTASSIUM mmol/L 4.3 3.9 4.4   CHLORIDE mmol/L 102 105 102   CO2 mmol/L 25.6 25.2 25.0   GLUCOSE mg/dL 99 85 89      No results for input(s): \"PROTIME\", \"INR\", \"PTT\" in the last 23654 hours.  Lab Results - Last 18 Months   Lab Units 09/27/24  1004 08/30/24  1257 07/18/24  1057   AST (SGOT) U/L 14 28 41*   ALT (SGPT) U/L 10 19 73*   ALK PHOS U/L 84 106 110   BILIRUBIN mg/dL 0.2 0.3 0.3   TOTAL PROTEIN g/dL 6.4 7.2 6.6   ALBUMIN g/dL 3.4* 4.4 4.1      Lab Results - Last 18 Months   Lab Units 04/28/23  1317   VITAMIN B 12 pg/mL 1,918*   FOLATE ng/mL 10.40     No results for input(s): \"HAV\", \"HEPAIGM\", \"HEPBIGM\", \"HEPBCAB\", \"HBEAG\", \"HEPCAB\" in the last 47028 hours.    CT Abdomen Pelvis With Contrast    Result Date: 7/11/2024  Impression: 1.No acute process identified Electronically Signed: Oliver Caraballo MD  7/11/2024 4:18 PM EDT  Workstation ID: [id number]         Assessment and Plan        Assessment and Plan    Diagnoses and all orders for this visit:    1. Polymyositis (Primary)    2. History of colonic polyps  -     Case Request; Standing  -     Follow Anesthesia Guidelines / Protocol; " Future  -     Case Request  -     Sodium Sulfate-Mag Sulfate-KCl (Sutab) 5029-430-143 MG tablet; Take 12 tablets by mouth Every 12 (Twelve) Hours for 1 day. Take per office instructions  Dispense: 24 tablet; Refill: 0    3. Diverticulosis  -     Case Request; Standing  -     Follow Anesthesia Guidelines / Protocol; Future  -     Case Request  -     Sodium Sulfate-Mag Sulfate-KCl (Sutab) 9454-104-868 MG tablet; Take 12 tablets by mouth Every 12 (Twelve) Hours for 1 day. Take per office instructions  Dispense: 24 tablet; Refill: 0    4. History of diverticulitis  -     Case Request; Standing  -     Follow Anesthesia Guidelines / Protocol; Future  -     Case Request  -     Sodium Sulfate-Mag Sulfate-KCl (Sutab) 4998-725-127 MG tablet; Take 12 tablets by mouth Every 12 (Twelve) Hours for 1 day. Take per office instructions  Dispense: 24 tablet; Refill: 0    5. Dysphagia, unspecified type  -     Case Request; Standing  -     Follow Anesthesia Guidelines / Protocol; Future  -     Case Request  -     Sodium Sulfate-Mag Sulfate-KCl (Sutab) 5337-255-312 MG tablet; Take 12 tablets by mouth Every 12 (Twelve) Hours for 1 day. Take per office instructions  Dispense: 24 tablet; Refill: 0    6. Nausea and vomiting, unspecified vomiting type  -     Case Request; Standing  -     Follow Anesthesia Guidelines / Protocol; Future  -     Case Request  -     Sodium Sulfate-Mag Sulfate-KCl (Sutab) 7647-814-359 MG tablet; Take 12 tablets by mouth Every 12 (Twelve) Hours for 1 day. Take per office instructions  Dispense: 24 tablet; Refill: 0    7. Heartburn  -     Case Request; Standing  -     Follow Anesthesia Guidelines / Protocol; Future  -     Case Request  -     Sodium Sulfate-Mag Sulfate-KCl (Sutab) [id number] MG tablet; Take 12 tablets by mouth Every 12 (Twelve) Hours for 1 day. Take per office instructions  Dispense: 24 tablet; Refill: 0    Other orders  -     Follow Anesthesia Guidelines / Protocol; Standing  -     Verify NPO;  Standing  -     Verify Bowel Prep Was Successful; Standing  -     Give Tap Water Enema If Bowel Prep Insufficient; Standing        ESOPHAGOGASTRODUODENOSCOPY AND COLONOSCOPY:A flexible tube that has a camera and light at the end will pass through the oral cavity into the esophagus (food tube), stomach and upper part of the small bowel and another flexible tube with a camera and light at the end will pass from the rear end to visualize the large bowel with possible removal of protruded tissue to send for testing. (N/A), ESOPHAGOGASTRODUODENOSCOPY AND COLONOSCOPY (N/A)      Follow Up        Follow Up   No follow-ups on file.    Colonoscopy and EGD ordered due to history of colonic polyps, and diverticulitis history, dysphagia, and nausea and vomiting. Due to the patient having a sacral neurostimulator by [employer] therapy she is unable to tolerate bowel preps and could only do the sutab and agrees to follow 3 day liquid diet prior to procedure. I have recommended that the patient undergo further evaluation with an EGD and colonoscopy.  I have discussed this procedure in detail with the patient.  I have discussed the risk, benefits and alternatives.  I have discussed the risk of anesthesia, bleeding and perforation.  Patient understands these risks, benefits and alternatives and wishes to proceed.  I will schedule her at her earliest convenience.  Patient agreeable to this plan and will call with any questions or concerns.    Patient was given instructions and counseling regarding her condition or for health maintenance advice. Please see specific information pulled into the AVS if appropriate.

## 2024-10-21 ENCOUNTER — OFFICE VISIT (OUTPATIENT)
Dept: GASTROENTEROLOGY | Facility: CLINIC | Age: 69
End: 2024-10-21
Payer: MEDICARE

## 2024-10-21 ENCOUNTER — TELEPHONE (OUTPATIENT)
Dept: GASTROENTEROLOGY | Facility: CLINIC | Age: 69
End: 2024-10-21

## 2024-10-21 VITALS
OXYGEN SATURATION: 95 % | HEART RATE: 51 BPM | DIASTOLIC BLOOD PRESSURE: 101 MMHG | WEIGHT: 122.2 LBS | SYSTOLIC BLOOD PRESSURE: 137 MMHG | BODY MASS INDEX: 21.65 KG/M2

## 2024-10-21 DIAGNOSIS — R12 HEARTBURN: ICD-10-CM

## 2024-10-21 DIAGNOSIS — R13.10 DYSPHAGIA, UNSPECIFIED TYPE: ICD-10-CM

## 2024-10-21 DIAGNOSIS — Z87.19 HISTORY OF DIVERTICULITIS: ICD-10-CM

## 2024-10-21 DIAGNOSIS — K57.90 DIVERTICULOSIS: ICD-10-CM

## 2024-10-21 DIAGNOSIS — Z86.0100 HISTORY OF COLONIC POLYPS: ICD-10-CM

## 2024-10-21 DIAGNOSIS — Z85.038 HISTORY OF COLON CANCER: ICD-10-CM

## 2024-10-21 DIAGNOSIS — M33.20 POLYMYOSITIS: Primary | ICD-10-CM

## 2024-10-21 DIAGNOSIS — R11.2 NAUSEA AND VOMITING, UNSPECIFIED VOMITING TYPE: ICD-10-CM

## 2024-10-21 PROCEDURE — 99214 OFFICE O/P EST MOD 30 MIN: CPT

## 2024-10-21 PROCEDURE — 1160F RVW MEDS BY RX/DR IN RCRD: CPT

## 2024-10-21 PROCEDURE — 1159F MED LIST DOCD IN RCRD: CPT

## 2024-10-21 RX ORDER — SOD SULF/POT CHLORIDE/MAG SULF 1.479 G
12 TABLET ORAL EVERY 12 HOURS
Qty: 24 TABLET | Refills: 0 | Status: SHIPPED | OUTPATIENT
Start: 2024-10-21 | End: 2024-10-22

## 2024-10-21 NOTE — TELEPHONE ENCOUNTER
Nora Bone, 1955, has requested to transfer care from DEANA Dennison to DEANA Boateng.    Reason for transfer: Pt request    Please review the patients records for possible transfer of care. The patient is aware that it is at the receiving provider's discretion to approve or deny this transfer request.       Transfer of care Approved per DEANA Boateng

## 2024-10-24 ENCOUNTER — PATIENT ROUNDING (BHMG ONLY) (OUTPATIENT)
Dept: GASTROENTEROLOGY | Facility: CLINIC | Age: 69
End: 2024-10-24
Payer: MEDICARE

## 2024-10-24 NOTE — PROGRESS NOTES
"10/24/2024      Hello, may I speak with Nora Bone     My name is Rickie. I am calling from [facility]. I show that you had a recent visit with Dian DEANA Pickett.    Before we get started may I verify your date of birth? 1955    I am calling to officially welcome you to our practice and ask about your recent visit. Is this a good time to talk? Yes    Tell me about your visit with us. What things went well? \"My visit with Dian went very well. She was delightful, explained everything to me very well. I had a thorough visit.\"     We strive to ensure that we protect your safety and privacy. Is there anything we could have done to improve this during your visit?    No    We're always looking for ways to make our patients' experiences even better. Do you have recommendations on ways we may improve? No    Overall were you satisfied with your first visit to our practice? Yes    I appreciate you taking the time to speak with me today. Is there anything else I can do for you? No    I am glad to hear that you had a very good visit and I appreciate you taking the time to provide feedback on this call. We would greatly appreciate you filling out a survey if you receive one in the mail, email or text. This is a great opportunity to provide any additional feedback that you may think of after this call as well.       Thank you, and have a great day.  "

## 2024-11-04 NOTE — PRE-PROCEDURE INSTRUCTIONS
Reminded of arrival time at 1000, Entrance C of the [facility] hospital. Instructed to bring or have a  over the age of 18 set up to drive you home the day of procedure.    Instructed on clear liquid diet the day before, nothing red or purple. Call with any questions about the prep or if in need of the prep.  Reminded them not to eat or drink anything am of procedure unless its a sip of water with medications. Use/ bring inhalers morning of procedure.  Patient verbalized understanding.

## 2024-11-11 ENCOUNTER — ANESTHESIA EVENT (OUTPATIENT)
Dept: GASTROENTEROLOGY | Facility: HOSPITAL | Age: 69
End: 2024-11-11
Payer: MEDICARE

## 2024-11-11 DIAGNOSIS — E03.9 HYPOTHYROIDISM, UNSPECIFIED TYPE: ICD-10-CM

## 2024-11-11 DIAGNOSIS — M19.90 ARTHRITIS: ICD-10-CM

## 2024-11-11 RX ORDER — DULOXETIN HYDROCHLORIDE 30 MG/1
30 CAPSULE, DELAYED RELEASE ORAL DAILY
Qty: 30 CAPSULE | Refills: 1 | Status: SHIPPED | OUTPATIENT
Start: 2024-11-11

## 2024-11-11 RX ORDER — LEVOTHYROXINE SODIUM 88 UG/1
88 TABLET ORAL DAILY
Qty: 30 TABLET | Refills: 1 | Status: SHIPPED | OUTPATIENT
Start: 2024-11-11

## 2024-11-11 NOTE — ANESTHESIA PREPROCEDURE EVALUATION
Anesthesia Evaluation     Patient summary reviewed and Nursing notes reviewed   NPO Solid Status: > 8 hours  NPO Liquid Status: > 4 hours           Airway   Mallampati: II  TM distance: >3 FB  Neck ROM: full  No difficulty expected  Dental    (+) edentulous    Pulmonary    (+) a smoker (current smokes marijuana) Current, Abstained day of surgery, COPD (nebs prn, inhalers daily) moderate, asthma,rhonchi, decreased breath sounds  Cardiovascular   Exercise tolerance: poor (<4 METS)    ECG reviewed  Rhythm: regular  Rate: normal    (+) hypertension well controlled, PVD, DVT, hyperlipidemia      Neuro/Psych  (+) headaches, numbness, psychiatric history Anxiety  GI/Hepatic/Renal/Endo    (+) GERD well controlled, renal disease- stones, thyroid problem hypothyroidism and hyperthyroidism    Musculoskeletal     (+) back pain  Abdominal    Substance History   (+) drug use (smokes marijuana daily)     OB/GYN          Other   arthritis,   history of cancer (Cervical)    ROS/Med Hx Other: History of polymyositis and is being treated with 40 mg prednisone daily (per anes preop 5/3/24)- last dose was 11/11 per the patient     EKG 5/3/24  HEART RATE= 63  bpm  RR Interval= 960  ms  DE Interval= 128  ms  P Horizontal Axis= -7  deg  P Front Axis= 70  deg  QRSD Interval= 83  ms  QT Interval= 409  ms  QTcB= 417  ms  QRS Axis= 41  deg  T Wave Axis= 35  deg  - BORDERLINE ECG -  Sinus rhythm  Probable left atrial enlargement  No previous ECG available for comparison  Electronically Signed By: Reyes Xie ([facility]) 05-May-2024 18:06:41  Date and Time of Study: 2024-05-03 09:13:59            Phys Exam Other: 94% RA    Duoneb in preop     Poor circulation to extremities               Anesthesia Plan    ASA 3     general   total IV anesthesia  (Total IV Anesthesia    Patient understands anesthesia not responsible for dental damage.  )  intravenous induction     Anesthetic plan, risks, benefits, and alternatives have been provided, discussed  and informed consent has been obtained with: patient and other.  Pre-procedure education provided  Plan discussed with CRNA.      CODE STATUS:

## 2024-11-13 ENCOUNTER — HOSPITAL ENCOUNTER (OUTPATIENT)
Facility: HOSPITAL | Age: 69
Setting detail: HOSPITAL OUTPATIENT SURGERY
Discharge: HOME | End: 2024-11-13
Attending: INTERNAL MEDICINE | Admitting: INTERNAL MEDICINE
Payer: MEDICARE

## 2024-11-13 ENCOUNTER — ANESTHESIA (OUTPATIENT)
Dept: GASTROENTEROLOGY | Facility: HOSPITAL | Age: 69
End: 2024-11-13
Payer: MEDICARE

## 2024-11-13 VITALS
OXYGEN SATURATION: 92 % | RESPIRATION RATE: 22 BRPM | SYSTOLIC BLOOD PRESSURE: 91 MMHG | TEMPERATURE: 96.7 F | HEART RATE: 71 BPM | DIASTOLIC BLOOD PRESSURE: 57 MMHG

## 2024-11-13 DIAGNOSIS — R13.10 DYSPHAGIA, UNSPECIFIED TYPE: ICD-10-CM

## 2024-11-13 DIAGNOSIS — R12 HEARTBURN: ICD-10-CM

## 2024-11-13 DIAGNOSIS — Z86.0100 HISTORY OF COLONIC POLYPS: ICD-10-CM

## 2024-11-13 DIAGNOSIS — R11.2 NAUSEA AND VOMITING, UNSPECIFIED VOMITING TYPE: ICD-10-CM

## 2024-11-13 DIAGNOSIS — Z87.19 HISTORY OF DIVERTICULITIS: ICD-10-CM

## 2024-11-13 PROCEDURE — 25010000002 LIDOCAINE PF 2% 2 % SOLUTION: Performed by: NURSE ANESTHETIST, CERTIFIED REGISTERED

## 2024-11-13 PROCEDURE — 88342 IMHCHEM/IMCYTCHM 1ST ANTB: CPT | Performed by: INTERNAL MEDICINE

## 2024-11-13 PROCEDURE — 45385 COLONOSCOPY W/LESION REMOVAL: CPT | Performed by: INTERNAL MEDICINE

## 2024-11-13 PROCEDURE — 88312 SPECIAL STAINS GROUP 1: CPT | Performed by: INTERNAL MEDICINE

## 2024-11-13 PROCEDURE — 25010000002 PROPOFOL 10 MG/ML EMULSION: Performed by: NURSE ANESTHETIST, CERTIFIED REGISTERED

## 2024-11-13 PROCEDURE — 25810000003 LACTATED RINGERS PER 1000 ML: Performed by: MARRIAGE & FAMILY THERAPIST

## 2024-11-13 PROCEDURE — 88305 TISSUE EXAM BY PATHOLOGIST: CPT | Performed by: INTERNAL MEDICINE

## 2024-11-13 PROCEDURE — 43239 EGD BIOPSY SINGLE/MULTIPLE: CPT | Performed by: INTERNAL MEDICINE

## 2024-11-13 RX ORDER — PROPOFOL 10 MG/ML
VIAL (ML) INTRAVENOUS AS NEEDED
Status: DISCONTINUED | OUTPATIENT
Start: 2024-11-13 | End: 2024-11-13 | Stop reason: SURG

## 2024-11-13 RX ORDER — PANTOPRAZOLE SODIUM 40 MG/1
40 TABLET, DELAYED RELEASE ORAL
Qty: 60 TABLET | Refills: 2 | Status: SHIPPED | OUTPATIENT
Start: 2024-11-13 | End: 2024-11-18

## 2024-11-13 RX ORDER — EPHEDRINE SULFATE 50 MG/ML
INJECTION INTRAVENOUS AS NEEDED
Status: DISCONTINUED | OUTPATIENT
Start: 2024-11-13 | End: 2024-11-13 | Stop reason: SURG

## 2024-11-13 RX ORDER — PHENYLEPHRINE HCL IN 0.9% NACL 1 MG/10 ML
SYRINGE (ML) INTRAVENOUS AS NEEDED
Status: DISCONTINUED | OUTPATIENT
Start: 2024-11-13 | End: 2024-11-13 | Stop reason: SURG

## 2024-11-13 RX ORDER — LIDOCAINE HYDROCHLORIDE 20 MG/ML
INJECTION, SOLUTION EPIDURAL; INFILTRATION; INTRACAUDAL; PERINEURAL AS NEEDED
Status: DISCONTINUED | OUTPATIENT
Start: 2024-11-13 | End: 2024-11-13 | Stop reason: SURG

## 2024-11-13 RX ORDER — SODIUM CHLORIDE, SODIUM LACTATE, POTASSIUM CHLORIDE, CALCIUM CHLORIDE 600; 310; 30; 20 MG/100ML; MG/100ML; MG/100ML; MG/100ML
30 INJECTION, SOLUTION INTRAVENOUS CONTINUOUS
Status: DISCONTINUED | OUTPATIENT
Start: 2024-11-13 | End: 2024-11-13 | Stop reason: HOSPADM

## 2024-11-13 RX ORDER — IPRATROPIUM BROMIDE AND ALBUTEROL SULFATE 2.5; .5 MG/3ML; MG/3ML
3 SOLUTION RESPIRATORY (INHALATION) ONCE
Status: COMPLETED | OUTPATIENT
Start: 2024-11-13 | End: 2024-11-13

## 2024-11-13 RX ADMIN — IPRATROPIUM BROMIDE AND ALBUTEROL SULFATE 3 ML: .5; 3 SOLUTION RESPIRATORY (INHALATION) at 10:41

## 2024-11-13 RX ADMIN — SODIUM CHLORIDE, POTASSIUM CHLORIDE, SODIUM LACTATE AND CALCIUM CHLORIDE: 600; 310; 30; 20 INJECTION, SOLUTION INTRAVENOUS at 10:58

## 2024-11-13 RX ADMIN — EPHEDRINE SULFATE 10 MG: 50 INJECTION INTRAVENOUS at 12:13

## 2024-11-13 RX ADMIN — Medication 100 MCG: at 12:13

## 2024-11-13 RX ADMIN — PROPOFOL 250 MCG/KG/MIN: 10 INJECTION, EMULSION INTRAVENOUS at 11:02

## 2024-11-13 RX ADMIN — Medication 100 MCG: at 12:10

## 2024-11-13 RX ADMIN — LIDOCAINE HYDROCHLORIDE 100 MG: 20 INJECTION, SOLUTION INTRAVENOUS at 11:01

## 2024-11-13 RX ADMIN — EPHEDRINE SULFATE 10 MG: 50 INJECTION INTRAVENOUS at 12:10

## 2024-11-13 RX ADMIN — PROPOFOL 70 MG: 10 INJECTION, EMULSION INTRAVENOUS at 11:01

## 2024-11-13 NOTE — ANESTHESIA POSTPROCEDURE EVALUATION
Patient: Nora Bone    Procedure Summary       Date: 11/13/24 Room / Location: [facility] ENDOSCOPY 5 / [facility] ENDOSCOPY    Anesthesia Start: 1058 Anesthesia Stop: 1218    Procedures:       ESOPHAGOGASTRODUODENOSCOPY WITH BIOPSY      COLONOSCOPY WITH POLYPECTOMY/SNARE AND BIOPSY Diagnosis:       History of colonic polyps      History of diverticulitis      Dysphagia, unspecified type      Nausea and vomiting, unspecified vomiting type      Heartburn      (History of colonic polyps [Z86.0100])      (History of diverticulitis [Z87.19])      (Dysphagia, unspecified type [R13.10])      (Nausea and vomiting, unspecified vomiting type [R11.2])      (Heartburn [R12])    Surgeons: Lucian Escalona MD Provider: Modesta Kong CRNA    Anesthesia Type: general ASA Status: 3            Anesthesia Type: general    Vitals  Vitals Value Taken Time   BP 93/67 11/13/24 1247   Temp 35.9 °C (96.7 °F) 11/13/24 1242   Pulse 71 11/13/24 1247   Resp 22 11/13/24 1242   SpO2 91 % 11/13/24 1247   Vitals shown include unfiled device data.        Post Anesthesia Care and Evaluation    Post-procedure mental status: acceptable.  Pain management: satisfactory to patient    Airway patency: patent  Anesthetic complications: No anesthetic complications    Cardiovascular status: acceptable  Respiratory status: acceptable    Comments: Per chart review

## 2024-11-15 LAB
CYTO UR: NORMAL
LAB AP CASE REPORT: NORMAL
LAB AP CLINICAL INFORMATION: NORMAL
LAB AP SPECIAL STAINS: NORMAL
PATH REPORT.FINAL DX SPEC: NORMAL
PATH REPORT.GROSS SPEC: NORMAL

## 2024-11-18 ENCOUNTER — TELEPHONE (OUTPATIENT)
Age: 69
End: 2024-11-18
Payer: MEDICARE

## 2024-11-18 ENCOUNTER — PREP FOR SURGERY (OUTPATIENT)
Dept: OTHER | Facility: HOSPITAL | Age: 69
End: 2024-11-18
Payer: MEDICARE

## 2024-11-18 ENCOUNTER — TELEPHONE (OUTPATIENT)
Dept: GASTROENTEROLOGY | Facility: CLINIC | Age: 69
End: 2024-11-18
Payer: MEDICARE

## 2024-11-18 DIAGNOSIS — M19.90 ARTHRITIS: ICD-10-CM

## 2024-11-18 DIAGNOSIS — K25.9 GASTRIC ULCER, UNSPECIFIED CHRONICITY, UNSPECIFIED WHETHER GASTRIC ULCER HEMORRHAGE OR PERFORATION PRESENT: Primary | ICD-10-CM

## 2024-11-18 RX ORDER — LIDOCAINE 50 MG/G
PATCH TOPICAL
Qty: 90 PATCH | Refills: 1 | Status: SHIPPED | OUTPATIENT
Start: 2024-11-18

## 2024-11-18 RX ORDER — FAMOTIDINE 40 MG/1
40 TABLET, FILM COATED ORAL 2 TIMES DAILY
Qty: 60 TABLET | Refills: 2 | Status: SHIPPED | OUTPATIENT
Start: 2024-11-18 | End: 2025-02-16

## 2024-11-18 NOTE — TELEPHONE ENCOUNTER
PT called the office and stated she has had a bad reaction to Pantoprazole, that it is making her has constant vomiting. Pt was wondering if there was something else that could be prescribed, please advise.

## 2024-11-18 NOTE — TELEPHONE ENCOUNTER
Contacted pt and let her know to discontinue pantoprazole, and that pepcid was called in to the pharmacy on file. Pt verbalized understanding.

## 2024-11-19 ENCOUNTER — HOSPITAL ENCOUNTER (OUTPATIENT)
Dept: CT IMAGING | Facility: HOSPITAL | Age: 69
Discharge: HOME | End: 2024-11-19
Payer: MEDICARE

## 2024-11-19 ENCOUNTER — HOSPITAL ENCOUNTER (OUTPATIENT)
Dept: MAMMOGRAPHY | Facility: HOSPITAL | Age: 69
Discharge: HOME | End: 2024-11-19
Payer: MEDICARE

## 2024-11-19 ENCOUNTER — HOSPITAL ENCOUNTER (OUTPATIENT)
Dept: BONE DENSITY | Facility: HOSPITAL | Age: 69
Discharge: HOME | End: 2024-11-19
Payer: MEDICARE

## 2024-11-19 DIAGNOSIS — Z78.0 POST-MENOPAUSAL: ICD-10-CM

## 2024-11-19 DIAGNOSIS — Z12.31 ENCOUNTER FOR SCREENING MAMMOGRAM FOR MALIGNANT NEOPLASM OF BREAST: ICD-10-CM

## 2024-11-19 DIAGNOSIS — Z12.2 SCREENING FOR LUNG CANCER: ICD-10-CM

## 2024-11-19 DIAGNOSIS — Z87.891 PERSONAL HISTORY OF NICOTINE DEPENDENCE: ICD-10-CM

## 2024-11-19 PROCEDURE — 71271 CT THORAX LUNG CANCER SCR C-: CPT

## 2024-11-19 PROCEDURE — 77067 SCR MAMMO BI INCL CAD: CPT

## 2024-11-19 PROCEDURE — 77080 DXA BONE DENSITY AXIAL: CPT

## 2024-11-19 PROCEDURE — 77063 BREAST TOMOSYNTHESIS BI: CPT

## 2024-12-09 DIAGNOSIS — J30.89 NON-SEASONAL ALLERGIC RHINITIS, UNSPECIFIED TRIGGER: ICD-10-CM

## 2024-12-09 RX ORDER — LEVOCETIRIZINE DIHYDROCHLORIDE 5 MG/1
5 TABLET, FILM COATED ORAL EVERY EVENING
Qty: 90 TABLET | Refills: 0 | Status: SHIPPED | OUTPATIENT
Start: 2024-12-09

## 2024-12-16 ENCOUNTER — OFFICE VISIT (OUTPATIENT)
Dept: GASTROENTEROLOGY | Facility: CLINIC | Age: 69
End: 2024-12-16
Payer: MEDICARE

## 2024-12-16 VITALS
HEART RATE: 74 BPM | DIASTOLIC BLOOD PRESSURE: 92 MMHG | BODY MASS INDEX: 20.9 KG/M2 | SYSTOLIC BLOOD PRESSURE: 156 MMHG | WEIGHT: 118 LBS | OXYGEN SATURATION: 99 %

## 2024-12-16 DIAGNOSIS — M33.20 POLYMYOSITIS: Primary | ICD-10-CM

## 2024-12-16 DIAGNOSIS — M85.80 OSTEOPENIA, UNSPECIFIED LOCATION: ICD-10-CM

## 2024-12-16 DIAGNOSIS — R12 HEARTBURN: ICD-10-CM

## 2024-12-16 PROCEDURE — 99213 OFFICE O/P EST LOW 20 MIN: CPT

## 2024-12-16 PROCEDURE — 1160F RVW MEDS BY RX/DR IN RCRD: CPT

## 2024-12-16 PROCEDURE — 1159F MED LIST DOCD IN RCRD: CPT

## 2024-12-16 NOTE — PATIENT INSTRUCTIONS
Patient should be off of prednisone at least 1 to 2 weeks before EGD or minimal dose of prednisone ( <10 mg/day for at least 2 weeks) for esophageal dilation and biopsies.

## 2024-12-16 NOTE — PROGRESS NOTES
Chief Complaint     Colonoscopy, EGD, Follow-up, and Hemorrhoids (Pt states when she wipes there is some blood )    History of Present Illness     Nora Bone is a 69 y.o. female who presents to [facility] for follow-up after scopes.    History of Present Illness:  She reports prior history of bowel an bladder and Dr. Dowd placed a sacral neuromodulation called Axonics therapy. Patient explains she is here to schedule her colonoscopy. Initially her PCP scheduled the appointment for elevated LFTs but on the past 2 repeat checks they are within normal limits.   Denies RUQ pain, alcohol use, IV drug use, unprofessional tattoos, history of or exposure to hepatitis, and family history of liver disease. After her son's birth she did receive a blood tranfusion over 42 years ago.      She reports having 1 BM a day, stool described a pellets and hard. Reports seeing blood if she is straining, but explains it seldom occurs. No reports of melena. She reports LLQ pain, that occurs a couple times a week, described as a pinched and sharp pain that last a couple of minutes. Alleviated with movement.      She take 2-4 steroids a day due to Polymyositis, and explains she has nausea and vomiting that she relates to the steroid use. Occasionally will take the Zofran but it doesn't help the nausea. Reports heartburn 1-2 times a month, for which she will start a clear liquid diet until it resolve. Patient has trouble swallowing 203 times a week with liquids and solids. Reports it getting stuck and she will have to regurgitate it to clear. She reports sometimes it is painful to swallowing. Denies unintentional weight loss.     Patient has a positive family history of colon cancer, found in her father  at 54. Patient's last EGD/Colonoscopy 2019 performed by Dr Duff. Finding of polyps and severe diverticulosis of the sigmoid colon. EGD and Colonoscopy ordered due to diverticulitis  history, dysphagia, and nausea and vomiting.    11/15/2024 EGD and Colonoscopy performed by Dr. Escalona. Z-line regular, 36 cm from the incisors.  - Benign-appearing partial non-obstructing esophageal ring.  - Small hiatal hernia.  - Esophageal plaques were found, suspicious for candidiasis. Biopsied.  - [place] colored mucosa at GE junction. Biopsy not obtained  - Non-bleeding gastric ulcers with pigmented material. Biopsied.  - Normal duodenal bulb, first portion of the duodenum, second portion of the duodenum and third portion of the duodenum     Pathology:Gastric antrum and body biopsies with active inflammation and erosions. No H. pylori or intestinal metaplasia. Random esophageal biopsies rule out candidiasis.  Also noted to have reflux esophagitis    Recommendations: Patient should be off of prednisone at least 1 to 2 weeks before EGD or minimal dose of prednisone ( <10 mg/day for at least 2 weeks) for esophageal dilation and biopsies. Continue Pepcid 40 mg orally twice daily for 3 months and then once daily. Repeat EGD in 3 months to evaluate healing of gastric ulcers     Colonoscopy findings included  - Unusually difficult procedure-changed of scope  -One 7 mm polyp in the transverse colon, removed with a cold snare. Resected and retrieved.  - One 5 mm polyp in the proximal descending colon, removed with a cold snare. Resected and retrieved.  -Moderate severity diverticulosis of the sigmoid colon.  Also diverticulosis seen in the descending colon, in the transverse colon and at the hepatic flexure.  -Moderate to severe diverticulosis in the sigmoid colon. There was narrowing of the colon in association with the diverticular opening. Scope changed to adult Endoscope - Non-bleeding non-thrombosed non-prolapsed external and internal hemorrhoids.     Pathology:Transverse colon polyp-hyperplastic and descending colon polyp-tubular adenoma     Recommendations: Repeat surveillance colonoscopy in 5 years. Avoid  constipation or is straining during defecation. Take Metamucil 1 packet twice daily to regulate BM.    12/16/2024 Office visit: Patient is scheduled for a scope on February 18th 2025. She reports since the EGD and Colonoscopy she has not been taking the prednisone. She is currently taking Pepcid as indicated by Dr. Escalona. Heartburn is controlled. Reports occasional nausea but no vomiting or hematemesis. Explains she continues to have trouble swallowing and has adjusted her diet to avoid meats. Reports automatic regurgitation of stuck foods. Denies unintentional weight loss.    Reports 1-2 BMs a day, and stool described as formed and long. Denies straining during defecation. She reports a history of hemorrhoids and will see small amount of blood on the TP when wiping. Denies melena or abdominal pain.       History      Past Medical History:   Diagnosis Date    Anemia     NO CURRENT ISSUES GETS MONTHLY SHOTS    Anxiety     Asthma     INHALER, CONTROLLED    Back pain     OCCASSIONAL    Cervical cancer     COPD (chronic obstructive pulmonary disease)     no O2 use    Deep vein thrombosis     NO CURRENT ISSUES NO THINNERS    Disease of thyroid gland     Diverticulosis     Fecal incontinence     Hearing loss     Hyperlipidemia     Hypertension     NO MEDS    Muscle weakness     detereration    Peripheral vascular disease     WEARS SUPPORT HOSE/R VARICOSE VEINS    Polymyositis     FOLLOWS BROCK, DAILY PREDNISONE     Past Surgical History:   Procedure Laterality Date    CARPAL TUNNEL RELEASE Bilateral     COLONOSCOPY N/A 11/13/2024    Procedure: COLONOSCOPY WITH POLYPECTOMY/SNARE AND BIOPSY;  Surgeon: Lucian Escalona MD;  Location: [facility] ENDOSCOPY;  Service: Gastroenterology;  Laterality: N/A;  COLON POLYPS/HEMORRHOIDS/DIVERTICULOSIS    COLONOSCOPY W/ BIOPSIES      DILATATION AND CURETTAGE      ENDOSCOPY N/A 11/13/2024    Procedure: ESOPHAGOGASTRODUODENOSCOPY WITH BIOPSY;  Surgeon: Lucian Escalona MD;  Location:   "[facility] ENDOSCOPY;  Service: Gastroenterology;  Laterality: N/A;  GASTRIC BODY ULCERS, HIATAL HERNIA, CANDIDIASIS    INTERSTIM PLACEMENT N/A 4/19/2024    Procedure: basic peripheral nerve evaluation;  Surgeon: Roque Dowd MD;  Location: [facility];  Service: General;  Laterality: N/A;    INTERSTIM PLACEMENT N/A 5/3/2024    Procedure: INTERSTIM STAGES 1 AND 2 LEAD AND GENERATOR PLACEMENT, full implant procedure;  Surgeon: Roque Dowd MD;  Location: [facility] OR [facility];  Service: General;  Laterality: N/A;    MUSCLE BIOPSY Right 02/26/2024    Procedure: MUSCLE BIOPSY right thigh;  Surgeon: Roque Dowd MD;  Location: [facility];  Service: General;  Laterality: Right;    VAGINAL HYSTERECTOMY      VEIN SURGERY Right     STRIPPING     Family History   Problem Relation Age of Onset    Ovarian cancer Mother     Heart disease Mother     Thyroid disease Mother     Epilepsy Father     Breast cancer Sister     Breast cancer Paternal Aunt     Malig Hyperthermia Neg Hx     Colon cancer Neg Hx         Current Medications       Current Outpatient Medications:     albuterol sulfate  (90 Base) MCG/ACT inhaler, Inhale 2 puffs Every 6 (Six) Hours As Needed for Wheezing., Disp: 18 g, Rfl: 3    alendronate (FOSAMAX) 35 MG tablet, Take 1 tablet by mouth Every 7 (Seven) Days., Disp: , Rfl:     amitriptyline (ELAVIL) 25 MG tablet, TAKE 1 TABLET BY MOUTH EVERY NIGHT AT BEDTIME, Disp: 90 tablet, Rfl: 1    B-D 3CC LUER-LUCINA SYR 25GX1\" 25G X 1\" 3 ML misc, use once monthly for b12 injection, Disp: 3 each, Rfl: 0    budesonide-formoterol (SYMBICORT) 160-4.5 MCG/ACT inhaler, Inhale 2 puffs 2 (Two) Times a Day., Disp: 10.2 g, Rfl: 2    Cholecalciferol 25 MCG (1000 UT) chewable tablet, Chew 1 tablet Daily., Disp: , Rfl:     cyanocobalamin 1000 MCG/ML injection, Inject 1 mL into the appropriate muscle as directed by prescriber Every 28 (Twenty-Eight) Days., Disp: 3 mL, Rfl: 3    Dietary Management Product " (Vasculera) tablet, Take 1 each by mouth Daily for 360 days., Disp: 30 each, Rfl: 11    DULoxetine (CYMBALTA) 30 MG capsule, TAKE 1 CAPSULE BY MOUTH EVERY DAY, Disp: 30 capsule, Rfl: 1    famotidine (Pepcid) 40 MG tablet, Take 1 tablet by mouth 2 (Two) Times a Day for 90 days., Disp: 60 tablet, Rfl: 2    fluticasone (FLONASE) 50 MCG/ACT nasal spray, USE ONE SPRAY IN EACH NOSTRIL EVERY DAY, Disp: 48 g, Rfl: 5    levocetirizine (XYZAL) 5 MG tablet, TAKE 1 TABLET BY MOUTH EVERY EVENING, Disp: 90 tablet, Rfl: 0    levothyroxine (SYNTHROID, LEVOTHROID) 88 MCG tablet, TAKE 1 TABLET BY MOUTH EVERY DAY, Disp: 30 tablet, Rfl: 1    lidocaine (LIDODERM) 5 %, APPLY 3 PATCHES ON THE SKIN AS DIRECTED EVERY DAY (REMOVE AND DISCARD PATCH WITHIN 12 HOUR OR AS DIRECTED BY MD), Disp: 90 patch, Rfl: 1    ondansetron (ZOFRAN) 4 MG tablet, Take 1 tablet by mouth 4 (Four) Times a Day As Needed for Nausea or Vomiting., Disp: 30 tablet, Rfl: 1    polyethylene glycol (MIRALAX) 17 g packet, Take 17 g by mouth Daily., Disp: 3 packet, Rfl: 0    Calcium Carb-Cholecalciferol (calcium 500 mg vitamin D 5 mcg, 200 UT,) 500-5 MG-MCG tablet per tablet, Take 1 tablet by mouth 2 (Two) Times a Day., Disp: 180 tablet, Rfl: 1    predniSONE (DELTASONE) 10 MG tablet, Take 4 tablets by mouth Daily. (Patient not taking: Reported on 12/16/2024), Disp: , Rfl:      Allergies     Allergies   Allergen Reactions    Contreet Hydrocolloid [Silver] Other (See Comments)     shaking    Hydrocodone-Acetaminophen Shortness Of Breath and Nausea And Vomiting     Also reports shaking       Pollen Extract Shortness Of Breath    Azathioprine Nausea And Vomiting    Molds & Smuts Rash    Percocet [Oxycodone-Acetaminophen] Other (See Comments)     PT REQUEST PERCOCET BE ADDED TO ALLERGY LIST/PT CANNOT SLEEP WHILE TAKING THIS MEDICINE        Social History       Social History     Social History Narrative    Not on file         Objective       /92 (BP Location: Left arm,  "Patient Position: Sitting, Cuff Size: Adult)   Pulse 74   Wt 53.5 kg (118 lb)   SpO2 99%   BMI 20.90 kg/m²       Physical Exam  HENT:      Head: Normocephalic.   Eyes:      Pupils: Pupils are equal, round, and reactive to light.   Cardiovascular:      Rate and Rhythm: Normal rate.   Pulmonary:      Effort: Pulmonary effort is normal.   Abdominal:      General: Bowel sounds are normal.   Musculoskeletal:         General: Normal range of motion.   Neurological:      General: No focal deficit present.      Mental Status: She is alert.   Psychiatric:         Mood and Affect: Mood normal.         Results       Result Review :    The following data was reviewed by: DEANA Boateng on 12/16/2024:    Lab Results - Last 18 Months   Lab Units 08/30/24  1257 07/11/24  0919 03/11/24  1139   WBC 10*3/mm3 10.65 11.25* 8.65   HEMOGLOBIN g/dL 15.7 15.3 15.4   MCV fL 90.4 88.9 87.5   PLATELETS 10*3/mm3 170 224 260         Lab Results - Last 18 Months   Lab Units 09/27/24  1004 08/30/24  1257 07/18/24  1057   BUN mg/dL 5* 9 22   CREATININE mg/dL 0.89 1.18* 1.51*   SODIUM mmol/L 139 141 137   POTASSIUM mmol/L 4.3 3.9 4.4   CHLORIDE mmol/L 102 105 102   CO2 mmol/L 25.6 25.2 25.0   GLUCOSE mg/dL 99 85 89      No results for input(s): \"PROTIME\", \"INR\", \"PTT\" in the last 62231 hours.  Lab Results - Last 18 Months   Lab Units 09/27/24  1004 08/30/24  1257 07/18/24  1057   AST (SGOT) U/L 14 28 41*   ALT (SGPT) U/L 10 19 73*   ALK PHOS U/L 84 106 110   BILIRUBIN mg/dL 0.2 0.3 0.3   TOTAL PROTEIN g/dL 6.4 7.2 6.6   ALBUMIN g/dL 3.4* 4.4 4.1      No results for input(s): \"IRON\", \"TRANSFERRIN\", \"TIBC\", \"FERRITIN\", \"[id number]\", \"FOLATE\" in the last 68717 hours.  No results for input(s): \"HAV\", \"HEPAIGM\", \"HEPBIGM\", \"HEPBCAB\", \"HBEAG\", \"HEPCAB\" in the last 27055 hours.    CT Chest Low Dose Cancer Screening WO    Result Date: 11/21/2024  Impression: 1. No suspicious pulmonary nodules or masses to indicate primary lung carcinoma. " 2. Pulmonary scarring as described above. 3. Mild bronchial wall thickening which can be seen with chronic bronchitis. 4. Additional findings as noted above. Recommendation: Continue annual screening with LDCT Lung Rads Assessment: Lung-RADS L1 - Negative, <1% chance of malignancy. Electronically Signed: Matt Johnson MD  11/21/2024 4:00 PM EST  Workstation ID: [id number]    Mammo Screening Digital Tomosynthesis Bilateral With CAD    Result Date: 11/19/2024  No mammographic evidence of malignancy.  Recommend annual screening mammography.  BI-RADS ASSESSMENT: Category 1: Negative  Note: It has been reported that there is approximately a 15% false negative rate in mammography.  Therefore, management of a palpable abnormality should not be deferred because of a negative mammogram.  Electronically Signed By-Matt Johnson MD On:11/19/2024 11:52 AM      DEXA Bone Density Axial    Result Date: 11/19/2024  Impression: Normal bone mineral density Report dictated by: Katie Torres  I have personally reviewed this case and agree with the findings above: Electronically Signed: Mumtaz Caldwell MD  11/19/2024 11:40 AM EST  Workstation ID: [id number]           Assessment and Plan              Diagnoses and all orders for this visit:    1. Polymyositis (Primary)    2. Heartburn          * Surgery not found *    Follow Up     Follow Up   Return for Follow up after EGD.    Continue Pepcid 40 mg twice a day.  Paitent states she plans to restart Prednisone after the EGD.  Continue with plan for EGD in February per Dr. Escalona's recommendations to evaluate healing of gastric ulcers. Patient should be off of prednisone at least 1 to 2 weeks before EGD or minimal dose of prednisone ( <10 mg/day for at least 2 weeks) for esophageal dilation and biopsies.      Patient was given instructions and counseling regarding her condition or for health maintenance advice. Please see specific information pulled into the AVS if appropriate.

## 2024-12-17 ENCOUNTER — TELEPHONE (OUTPATIENT)
Dept: FAMILY MEDICINE CLINIC | Facility: CLINIC | Age: 69
End: 2024-12-17

## 2024-12-17 NOTE — TELEPHONE ENCOUNTER
Caller: Nora Bone    Relationship to patient: Self    Best call back number: [phone]    Patient is needing: PATIENT CALLED IN AND IS REQUESTING AN ALTERNATIVE MEDICATION BESIDES     Calcium Carb-Cholecalciferol (calcium 500 mg vitamin D 5 mcg, 200 UT,) 500-5 MG-MCG tablet per tablet   THAT IS COVERED BY HER INSURANCE. PATIENT IS REQUESTING A CALL BACK AND SAID IT IS OKAY TO LEAVE MESSAGE ON PHONE.    [address] [phone] [facility] [phone]  [phone]

## 2024-12-23 ENCOUNTER — TELEPHONE (OUTPATIENT)
Dept: FAMILY MEDICINE CLINIC | Facility: CLINIC | Age: 69
End: 2024-12-23

## 2024-12-23 NOTE — TELEPHONE ENCOUNTER
Caller: Nora Bone    Relationship: Self    Best call back number: [phone]     Which medication are you concerned about: PATIENT STATE THAT SHE IS ALLERGIC TO THE YELLOW TABLET THAT DR. FAIR PRESCRIBED. PATIENT WANTING TO HAVE IT DOCUMENTED IN HER CHART. PATIENT STATES THAT SHE IS UNSURE OF THE NAME OF THE MEDICATION BUT THINKS IT'S PANTOPRAZOLE SODIUM.    Who prescribed you this medication: DR. RAFFY AMIN    When did you start taking this medication: AROUND A MONTH AGO    What are your concerns: PATIENT STATES THAT MEDICATION MADE HER VOMIT A LOT. PATIENT IS NOW ON FAMOTIDINE INSTEAD.

## 2024-12-26 RX ORDER — BUDESONIDE AND FORMOTEROL FUMARATE DIHYDRATE 160; 4.5 UG/1; UG/1
2 AEROSOL RESPIRATORY (INHALATION) 2 TIMES DAILY
Qty: 10.2 G | Refills: 0 | Status: SHIPPED | OUTPATIENT
Start: 2024-12-26

## 2024-12-27 ENCOUNTER — TELEPHONE (OUTPATIENT)
Dept: GASTROENTEROLOGY | Facility: CLINIC | Age: 69
End: 2024-12-27
Payer: MEDICARE

## 2024-12-27 NOTE — TELEPHONE ENCOUNTER
Dr. Escalona advised that he will need to be out of the office on 2.18.24.      Patient has an upcoming ENDO procedure. I called patient to reschedule. No answer. Left message for patient to call back.    Letter sent via [employer]    Procedure: EGD

## 2024-12-30 ENCOUNTER — OFFICE VISIT (OUTPATIENT)
Dept: FAMILY MEDICINE CLINIC | Facility: CLINIC | Age: 69
End: 2024-12-30
Payer: MEDICARE

## 2024-12-30 VITALS
BODY MASS INDEX: 20.71 KG/M2 | HEART RATE: 76 BPM | WEIGHT: 116.9 LBS | SYSTOLIC BLOOD PRESSURE: 130 MMHG | TEMPERATURE: 97.4 F | DIASTOLIC BLOOD PRESSURE: 85 MMHG | OXYGEN SATURATION: 90 %

## 2024-12-30 DIAGNOSIS — R21 RASH: Primary | ICD-10-CM

## 2024-12-30 DIAGNOSIS — E03.9 HYPOTHYROIDISM, UNSPECIFIED TYPE: ICD-10-CM

## 2024-12-30 LAB
ALBUMIN SERPL-MCNC: 4.3 G/DL (ref 3.5–5.2)
ALBUMIN/GLOB SERPL: 1.6 G/DL
ALP SERPL-CCNC: 112 U/L (ref 39–117)
ALT SERPL W P-5'-P-CCNC: 16 U/L (ref 1–33)
ANION GAP SERPL CALCULATED.3IONS-SCNC: 10.9 MMOL/L (ref 5–15)
AST SERPL-CCNC: 24 U/L (ref 1–32)
BASOPHILS # BLD AUTO: 0.14 10*3/MM3 (ref 0–0.2)
BASOPHILS NFR BLD AUTO: 1.9 % (ref 0–1.5)
BILIRUB SERPL-MCNC: 0.2 MG/DL (ref 0–1.2)
BUN SERPL-MCNC: 11 MG/DL (ref 8–23)
BUN/CREAT SERPL: 10.4 (ref 7–25)
CALCIUM SPEC-SCNC: 9.7 MG/DL (ref 8.6–10.5)
CHLORIDE SERPL-SCNC: 103 MMOL/L (ref 98–107)
CO2 SERPL-SCNC: 26.1 MMOL/L (ref 22–29)
CREAT SERPL-MCNC: 1.06 MG/DL (ref 0.57–1)
DEPRECATED RDW RBC AUTO: 48.3 FL (ref 37–54)
EGFRCR SERPLBLD CKD-EPI 2021: 57.7 ML/MIN/1.73
EOSINOPHIL # BLD AUTO: 0.27 10*3/MM3 (ref 0–0.4)
EOSINOPHIL NFR BLD AUTO: 3.6 % (ref 0.3–6.2)
ERYTHROCYTE [DISTWIDTH] IN BLOOD BY AUTOMATED COUNT: 15.8 % (ref 12.3–15.4)
GLOBULIN UR ELPH-MCNC: 2.7 GM/DL
GLUCOSE SERPL-MCNC: 108 MG/DL (ref 65–99)
HCT VFR BLD AUTO: 49.4 % (ref 34–46.6)
HGB BLD-MCNC: 16.3 G/DL (ref 12–15.9)
IMM GRANULOCYTES # BLD AUTO: 0.02 10*3/MM3 (ref 0–0.05)
IMM GRANULOCYTES NFR BLD AUTO: 0.3 % (ref 0–0.5)
LYMPHOCYTES # BLD AUTO: 2.33 10*3/MM3 (ref 0.7–3.1)
LYMPHOCYTES NFR BLD AUTO: 31.2 % (ref 19.6–45.3)
MCH RBC QN AUTO: 27.6 PG (ref 26.6–33)
MCHC RBC AUTO-ENTMCNC: 33 G/DL (ref 31.5–35.7)
MCV RBC AUTO: 83.7 FL (ref 79–97)
MONOCYTES # BLD AUTO: 0.61 10*3/MM3 (ref 0.1–0.9)
MONOCYTES NFR BLD AUTO: 8.2 % (ref 5–12)
NEUTROPHILS NFR BLD AUTO: 4.09 10*3/MM3 (ref 1.7–7)
NEUTROPHILS NFR BLD AUTO: 54.8 % (ref 42.7–76)
PLATELET # BLD AUTO: 225 10*3/MM3 (ref 140–450)
PMV BLD AUTO: 12.8 FL (ref 6–12)
POTASSIUM SERPL-SCNC: 4.6 MMOL/L (ref 3.5–5.2)
PROT SERPL-MCNC: 7 G/DL (ref 6–8.5)
RBC # BLD AUTO: 5.9 10*6/MM3 (ref 3.77–5.28)
SODIUM SERPL-SCNC: 140 MMOL/L (ref 136–145)
T4 FREE SERPL-MCNC: 1.39 NG/DL (ref 0.92–1.68)
TSH SERPL DL<=0.05 MIU/L-ACNC: 1.03 UIU/ML (ref 0.27–4.2)
WBC NRBC COR # BLD AUTO: 7.46 10*3/MM3 (ref 3.4–10.8)

## 2024-12-30 PROCEDURE — 36415 COLL VENOUS BLD VENIPUNCTURE: CPT | Performed by: FAMILY MEDICINE

## 2024-12-30 PROCEDURE — 1159F MED LIST DOCD IN RCRD: CPT | Performed by: FAMILY MEDICINE

## 2024-12-30 PROCEDURE — 84443 ASSAY THYROID STIM HORMONE: CPT | Performed by: FAMILY MEDICINE

## 2024-12-30 PROCEDURE — 1125F AMNT PAIN NOTED PAIN PRSNT: CPT | Performed by: FAMILY MEDICINE

## 2024-12-30 PROCEDURE — 85025 COMPLETE CBC W/AUTO DIFF WBC: CPT | Performed by: FAMILY MEDICINE

## 2024-12-30 PROCEDURE — 80053 COMPREHEN METABOLIC PANEL: CPT | Performed by: FAMILY MEDICINE

## 2024-12-30 PROCEDURE — 84439 ASSAY OF FREE THYROXINE: CPT | Performed by: FAMILY MEDICINE

## 2024-12-30 PROCEDURE — 1160F RVW MEDS BY RX/DR IN RCRD: CPT | Performed by: FAMILY MEDICINE

## 2024-12-30 PROCEDURE — 99213 OFFICE O/P EST LOW 20 MIN: CPT | Performed by: FAMILY MEDICINE

## 2024-12-30 PROCEDURE — 86038 ANTINUCLEAR ANTIBODIES: CPT | Performed by: FAMILY MEDICINE

## 2024-12-30 RX ORDER — PREDNISONE 20 MG/1
60 TABLET ORAL DAILY
Qty: 15 TABLET | Refills: 0 | Status: SHIPPED | OUTPATIENT
Start: 2024-12-30 | End: 2025-01-04

## 2024-12-30 RX ORDER — HYDROXYZINE HYDROCHLORIDE 25 MG/1
25 TABLET, FILM COATED ORAL 3 TIMES DAILY PRN
Qty: 21 TABLET | Refills: 0 | Status: SHIPPED | OUTPATIENT
Start: 2024-12-30

## 2024-12-30 NOTE — PROGRESS NOTES
Venipuncture Blood Specimen Collection  Venipuncture performed in [place] by Barb Sims with good hemostasis. Patient tolerated the procedure well without complications.   12/30/24   Nalini Lazcano MA

## 2024-12-30 NOTE — PROGRESS NOTES
Chief Complaint  Rash (X1.5 week)    Subjective          Nora Bone presents to [facility]  Rash  This is a new problem. The current episode started 1 to 4 weeks ago. The problem is unchanged. The rash is diffuse. The rash is characterized by itchiness. She was exposed to nothing. Pertinent negatives include no anorexia, congestion, cough, diarrhea, eye pain, facial edema, fatigue, fever, joint pain, nail changes, rhinorrhea, shortness of breath, sore throat or vomiting. Past treatments include nothing.       BMI is within normal parameters. No other follow-up for BMI required.       Objective   Allergies   Allergen Reactions    Contreet Hydrocolloid [Silver] Other (See Comments)     shaking    Hydrocodone-Acetaminophen Shortness Of Breath and Nausea And Vomiting     Also reports shaking       Pollen Extract Shortness Of Breath    Azathioprine Nausea And Vomiting    Pantoprazole Sodium GI Intolerance    Molds & Smuts Rash    Percocet [Oxycodone-Acetaminophen] Other (See Comments)     PT REQUEST PERCOCET BE ADDED TO ALLERGY LIST/PT CANNOT SLEEP WHILE TAKING THIS MEDICINE      Immunization History   Administered Date(s) Administered    ABRYSVO (RSV, 60+ or pregnant women 32-36 wks) 01/06/2024    COVID-19 (MODERNA) 1st,2nd,3rd Dose Monovalent 08/20/2021, 09/17/2021    Flu Vaccine Quad PF >36MO 02/07/2017, 09/27/2018, 10/15/2019, 01/12/2021    Fluzone (or Fluarix & Flulaval for VFC) >6mos 02/07/2017, 10/28/2021    Fluzone High-Dose 65+YRS 09/27/2024    Fluzone High-Dose 65+yrs 11/04/2022    Hepatitis A 03/13/2018, 09/13/2018    Pneumococcal Conjugate 20-Valent (PCV20) 09/12/2023    Pneumococcal Polysaccharide (PPSV23) 10/28/2021    Tdap 09/27/2018     Past Medical History:   Diagnosis Date    Anemia     NO CURRENT ISSUES GETS MONTHLY SHOTS    Anxiety     Asthma     INHALER, CONTROLLED    Back pain     OCCASSIONAL    Cervical cancer     COPD (chronic obstructive pulmonary disease)      no O2 use    Deep vein thrombosis     NO CURRENT ISSUES NO THINNERS    Disease of thyroid gland     Diverticulosis     Fecal incontinence     Hearing loss     Hyperlipidemia     Hypertension     NO MEDS    Muscle weakness     detereration    Peripheral vascular disease     WEARS SUPPORT HOSE/R VARICOSE VEINS    Polymyositis     FOLLOWS BROCK, DAILY PREDNISONE      Past Surgical History:   Procedure Laterality Date    CARPAL TUNNEL RELEASE Bilateral     COLONOSCOPY N/A 11/13/2024    Procedure: COLONOSCOPY WITH POLYPECTOMY/SNARE AND BIOPSY;  Surgeon: Lucian Escalona MD;  Location: [facility] END[facility]COPY;  Service: Gastroenterology;  Laterality: N/A;  COLON POLYPS/HEMORRHOIDS/DIVERTICULOSIS    COLONOSCOPY W/ BIOPSIES      DILATATION AND CURETTAGE      ENDOSCOPY N/A 11/13/2024    Procedure: ESOPHAGOGASTRODUODENOSCOPY WITH BIOPSY;  Surgeon: Lucian Escalona MD;  Location: [facility] ENDOSCOPY;  Service: Gastroenterology;  Laterality: N/A;  GASTRIC BODY ULCERS, HIATAL HERNIA, CANDIDIASIS    INTERSTIM PLACEMENT N/A 4/19/2024    Procedure: basic peripheral nerve evaluation;  Surgeon: Roque Dowd MD;  [place] [facility] [place] [facility]  Service: General;  Laterality: N/A;    INTERSTIM PLACEMENT N/A 5/3/2024    Procedure: INTERSTIM STAGES 1 AND 2 LEAD AND GENERATOR PLACEMENT, full implant procedure;  Surgeon: Roque Dowd MD;  [place] [facility] [place] [facility]  Service: General;  Laterality: N/A;    MUSCLE BIOPSY Right 02/26/2024    Procedure: MUSCLE BIOPSY right thigh;  Surgeon: Roque Dowd MD;  Location: [facility];  Service: General;  Laterality: Right;    VAGINAL HYSTERECTOMY      VEIN SURGERY Right     STRIPPING      Social History     Socioeconomic History    Marital status:    Tobacco Use    Smoking status: Former     Current packs/day: 0.00     Average packs/day: 1 pack/day for 20.0 years (20.0 ttl pk-yrs)     Types: Cigarettes     Start date: 1/1/1995     Quit date: 1/1/2015     Years since  "quitting: 10.0     Passive exposure: Never    Smokeless tobacco: Never   Vaping Use    Vaping status: Former   Substance and Sexual Activity    Alcohol use: Never    Drug use: Yes     Types: Marijuana     Comment: OCCASSIONAL PRN PAIN/ANXIETY/INST PER ANESTHESIA PROTOCOL    Sexual activity: Defer     Birth control/protection: Surgical        Current Outpatient Medications:     albuterol sulfate  (90 Base) MCG/ACT inhaler, Inhale 2 puffs Every 6 (Six) Hours As Needed for Wheezing., Disp: 18 g, Rfl: 3    alendronate (FOSAMAX) 35 MG tablet, Take 1 tablet by mouth Every 7 (Seven) Days., Disp: , Rfl:     amitriptyline (ELAVIL) 25 MG tablet, TAKE 1 TABLET BY MOUTH EVERY NIGHT AT BEDTIME, Disp: 90 tablet, Rfl: 1    B-D 3CC LUER-LUCINA SYR 25GX1\" 25G X 1\" 3 ML misc, use once monthly for b12 injection, Disp: 3 each, Rfl: 0    budesonide-formoterol (SYMBICORT) 160-4.5 MCG/ACT inhaler, inhale two puffs BY MOUTH TWICE DAILY, Disp: 10.2 g, Rfl: 0    Calcium Carb-Cholecalciferol (calcium 500 mg vitamin D 5 mcg, 200 UT,) 500-5 MG-MCG tablet per tablet, Take 1 tablet by mouth 2 (Two) Times a Day., Disp: 180 tablet, Rfl: 1    Cholecalciferol 25 MCG (1000 UT) chewable tablet, Chew 1 tablet Daily., Disp: , Rfl:     cyanocobalamin 1000 MCG/ML injection, Inject 1 mL into the appropriate muscle as directed by prescriber Every 28 (Twenty-Eight) Days., Disp: 3 mL, Rfl: 3    Dietary Management Product (Vasculera) tablet, Take 1 each by mouth Daily for 360 days., Disp: 30 each, Rfl: 11    DULoxetine (CYMBALTA) 30 MG capsule, TAKE 1 CAPSULE BY MOUTH EVERY DAY, Disp: 30 capsule, Rfl: 1    famotidine (Pepcid) 40 MG tablet, Take 1 tablet by mouth 2 (Two) Times a Day for 90 days., Disp: 60 tablet, Rfl: 2    fluticasone (FLONASE) 50 MCG/ACT nasal spray, USE ONE SPRAY IN EACH NOSTRIL EVERY DAY, Disp: 48 g, Rfl: 5    levocetirizine (XYZAL) 5 MG tablet, TAKE 1 TABLET BY MOUTH EVERY EVENING, Disp: 90 tablet, Rfl: 0    levothyroxine (SYNTHROID, " LEVOTHROID) 88 MCG tablet, TAKE 1 TABLET BY MOUTH EVERY DAY, Disp: 30 tablet, Rfl: 1    lidocaine (LIDODERM) 5 %, APPLY 3 PATCHES ON THE SKIN AS DIRECTED EVERY DAY (REMOVE AND DISCARD PATCH WITHIN 12 HOUR OR AS DIRECTED BY MD), Disp: 90 patch, Rfl: 1    ondansetron (ZOFRAN) 4 MG tablet, Take 1 tablet by mouth 4 (Four) Times a Day As Needed for Nausea or Vomiting., Disp: 30 tablet, Rfl: 1    polyethylene glycol (MIRALAX) 17 g packet, Take 17 g by mouth Daily., Disp: 3 packet, Rfl: 0    predniSONE (DELTASONE) 10 MG tablet, Take 4 tablets by mouth Daily., Disp: , Rfl:     hydrOXYzine (ATARAX) 25 MG tablet, Take 1 tablet by mouth 3 (Three) Times a Day As Needed for Itching., Disp: 21 tablet, Rfl: 0    predniSONE (DELTASONE) 20 MG tablet, Take 3 tablets by mouth Daily for 5 days., Disp: 15 tablet, Rfl: 0   Family History   Problem Relation Age of Onset    Ovarian cancer Mother     Heart disease Mother     Thyroid disease Mother     Epilepsy Father     Breast cancer Sister     Breast cancer Paternal Aunt     Malig Hyperthermia Neg Hx     Colon cancer Neg Hx           Vital Signs:   Vitals:    12/30/24 0858 12/30/24 0911   BP: 130/90 130/85   Pulse: 76    Temp: 97.4 °F (36.3 °C)    SpO2: 90%    Weight: 53 kg (116 lb 14.4 oz)        Review of Systems   Constitutional:  Negative for fatigue and fever.   HENT:  Negative for congestion, rhinorrhea and sore throat.    Eyes:  Negative for pain and visual disturbance.   Respiratory:  Negative for cough, chest tightness, shortness of breath and wheezing.    Cardiovascular:  Negative for chest pain, palpitations and leg swelling.   Gastrointestinal:  Negative for abdominal pain, anorexia, diarrhea, nausea and vomiting.   Musculoskeletal:  Negative for arthralgias and joint pain.   Skin:  Positive for rash. Negative for nail changes.   Neurological:  Negative for dizziness, light-headedness and headaches.      Physical Exam  Vitals reviewed.   Constitutional:       Appearance:  Normal appearance. She is well-developed.   HENT:      Head: Normocephalic and atraumatic.      Right Ear: External ear normal.      Left Ear: External ear normal.      Mouth/Throat:      Mouth: Mucous membranes are moist.      Pharynx: Oropharynx is clear. No oropharyngeal exudate or posterior oropharyngeal erythema.   Eyes:      Conjunctiva/sclera: Conjunctivae normal.      Pupils: Pupils are equal, round, and reactive to light.   Cardiovascular:      Rate and Rhythm: Normal rate and regular rhythm.      Pulses: Normal pulses.      Heart sounds: Normal heart sounds. No murmur heard.     No friction rub. No gallop.   Pulmonary:      Effort: Pulmonary effort is normal.      Breath sounds: Normal breath sounds. No wheezing or rhonchi.   Abdominal:      General: Abdomen is flat. Bowel sounds are normal. There is no distension.      Palpations: Abdomen is soft. There is no mass.      Tenderness: There is no abdominal tenderness. There is no guarding or rebound.      Hernia: No hernia is present.   Musculoskeletal:         General: Normal range of motion.   Skin:     General: Skin is warm and dry.      Capillary Refill: Capillary refill takes less than 2 seconds.      Comments: Papules on arms bilaterally and neck and scalp.   Neurological:      General: No focal deficit present.      Mental Status: She is alert and oriented to person, place, and time.      Cranial Nerves: No cranial nerve deficit.   Psychiatric:         Mood and Affect: Mood and affect normal.         Behavior: Behavior normal.         Thought Content: Thought content normal.         Judgment: Judgment normal.        Result Review :   The following data was reviewed by: Jarrod Simms MD on 12/30/2024:  CMP          7/18/2024    10:57 8/30/2024    12:57 9/27/2024    10:04   CMP   Glucose 89  85  99    BUN 22  9  5    Creatinine 1.51  1.18  0.89    EGFR 37.7  50.7  71.2    Sodium 137  141  139    Potassium 4.4  3.9  4.3    Chloride 102  105  102     Calcium 9.2  9.7  8.8    Total Protein 6.6  7.2  6.4    Albumin 4.1  4.4  3.4    Globulin 2.5  2.8  3.0    Total Bilirubin 0.3  0.3  0.2    Alkaline Phosphatase 110  106  84    AST (SGOT) 41  28  14    ALT (SGPT) 73  19  10    Albumin/Globulin Ratio 1.6  1.6  1.1    BUN/Creatinine Ratio 14.6  7.6  5.6    Anion Gap 10.0  10.8  11.4      CBC          3/11/2024    11:39 7/11/2024    09:19 8/30/2024    12:57   CBC   WBC 8.65  11.25  10.65    RBC 5.19  5.06  5.32    Hemoglobin 15.4  15.3  15.7    Hematocrit 45.4  45.0  48.1    MCV 87.5  88.9  90.4    MCH 29.7  30.2  29.5    MCHC 33.9  34.0  32.6    RDW 13.7  15.1  13.6    Platelets 260  224  170      Lipid Panel          3/11/2024    11:39 9/27/2024    10:04   Lipid Panel   Total Cholesterol 245  138    Triglycerides 174  81    HDL Cholesterol 54  37    VLDL Cholesterol 32  16    LDL Cholesterol  159  85    LDL/HDL Ratio 2.89  2.29      TSH          3/11/2024    11:39 5/20/2024    14:47 6/25/2024    13:34   TSH   TSH 0.250  0.162  1.730                Assessment and Plan    Diagnoses and all orders for this visit:    1. Rash (Primary)  -     predniSONE (DELTASONE) 20 MG tablet; Take 3 tablets by mouth Daily for 5 days.  Dispense: 15 tablet; Refill: 0  -     hydrOXYzine (ATARAX) 25 MG tablet; Take 1 tablet by mouth 3 (Three) Times a Day As Needed for Itching.  Dispense: 21 tablet; Refill: 0  -     CBC Auto Differential  -     Comprehensive Metabolic Panel  -     JAZMÍN With / DsDNA, RNP, Sjogrens A / B, Smith    2. Hypothyroidism, unspecified type  -     TSH+Free T4            Follow Up   Return if symptoms worsen or fail to improve.  Patient was given instructions and counseling regarding her condition or for health maintenance advice. Please see specific information pulled into the AVS if appropriate.

## 2024-12-31 DIAGNOSIS — N28.9 RENAL INSUFFICIENCY: Primary | ICD-10-CM

## 2024-12-31 LAB — ANA SER QL: NEGATIVE

## 2025-01-20 DIAGNOSIS — R21 RASH: ICD-10-CM

## 2025-01-20 DIAGNOSIS — M19.90 ARTHRITIS: ICD-10-CM

## 2025-01-20 RX ORDER — DULOXETIN HYDROCHLORIDE 30 MG/1
30 CAPSULE, DELAYED RELEASE ORAL DAILY
Qty: 30 CAPSULE | Refills: 1 | Status: SHIPPED | OUTPATIENT
Start: 2025-01-20

## 2025-01-20 RX ORDER — PREDNISONE 20 MG/1
60 TABLET ORAL DAILY
Qty: 15 TABLET | Refills: 0 | OUTPATIENT
Start: 2025-01-20 | End: 2025-01-25

## 2025-01-22 DIAGNOSIS — J45.40 MODERATE PERSISTENT ASTHMA WITHOUT COMPLICATION: ICD-10-CM

## 2025-01-22 RX ORDER — ALBUTEROL SULFATE 90 UG/1
2 INHALANT RESPIRATORY (INHALATION) EVERY 6 HOURS PRN
Qty: 18 G | Refills: 0 | Status: SHIPPED | OUTPATIENT
Start: 2025-01-22

## 2025-01-27 ENCOUNTER — CLINICAL SUPPORT (OUTPATIENT)
Dept: FAMILY MEDICINE CLINIC | Facility: CLINIC | Age: 70
End: 2025-01-27
Payer: MEDICARE

## 2025-01-27 DIAGNOSIS — R89.9 ABNORMAL LABORATORY TEST: Primary | ICD-10-CM

## 2025-01-27 LAB
ANION GAP SERPL CALCULATED.3IONS-SCNC: 10.4 MMOL/L (ref 5–15)
BUN SERPL-MCNC: 12 MG/DL (ref 8–23)
BUN/CREAT SERPL: 10.1 (ref 7–25)
CALCIUM SPEC-SCNC: 9.4 MG/DL (ref 8.6–10.5)
CHLORIDE SERPL-SCNC: 102 MMOL/L (ref 98–107)
CO2 SERPL-SCNC: 25.6 MMOL/L (ref 22–29)
CREAT SERPL-MCNC: 1.19 MG/DL (ref 0.57–1)
EGFRCR SERPLBLD CKD-EPI 2021: 50.2 ML/MIN/1.73
GLUCOSE SERPL-MCNC: 98 MG/DL (ref 65–99)
POTASSIUM SERPL-SCNC: 4.6 MMOL/L (ref 3.5–5.2)
SODIUM SERPL-SCNC: 138 MMOL/L (ref 136–145)

## 2025-01-27 PROCEDURE — 80048 BASIC METABOLIC PNL TOTAL CA: CPT | Performed by: FAMILY MEDICINE

## 2025-01-27 PROCEDURE — 36415 COLL VENOUS BLD VENIPUNCTURE: CPT | Performed by: FAMILY MEDICINE

## 2025-01-27 NOTE — PROGRESS NOTES
..  Venipuncture Blood Specimen Collection  Venipuncture performed in LT arm by Fior Goldberg with good hemostasis. Patient tolerated the procedure well without complications.   01/27/25   Barb Sims MA

## 2025-02-12 ENCOUNTER — TELEPHONE (OUTPATIENT)
Dept: GASTROENTEROLOGY | Facility: CLINIC | Age: 70
End: 2025-02-12
Payer: MEDICARE

## 2025-02-12 NOTE — TELEPHONE ENCOUNTER
Pharmacy:  [facility], [place]  Rx Refill Request:  pantoprazole 40 mg BID  Last ordered:  11.13.24 - 60 tabs with 2 refills  Last Office Visit:  12.16.24 with DEANA Boateng  Next Office Visit:  03.03.25      Pharmacy:  [facility], [place]  Rx Refill Request:  famotidine 40 mg BID  Last ordered:  11.18.24 - 60 tabs with 2 refills  Last Office Visit:  12.16.24  Next Office Visit:  03.03.25

## 2025-02-13 DIAGNOSIS — R12 HEARTBURN: Primary | ICD-10-CM

## 2025-02-13 RX ORDER — FAMOTIDINE 40 MG/1
40 TABLET, FILM COATED ORAL DAILY
Qty: 90 TABLET | Refills: 1 | Status: SHIPPED | OUTPATIENT
Start: 2025-02-13

## 2025-02-17 ENCOUNTER — ANESTHESIA EVENT (OUTPATIENT)
Dept: GASTROENTEROLOGY | Facility: HOSPITAL | Age: 70
End: 2025-02-17
Payer: MEDICARE

## 2025-02-17 ENCOUNTER — TELEPHONE (OUTPATIENT)
Dept: GASTROENTEROLOGY | Facility: CLINIC | Age: 70
End: 2025-02-17
Payer: MEDICARE

## 2025-02-17 NOTE — ANESTHESIA PREPROCEDURE EVALUATION
Anesthesia Evaluation     Patient summary reviewed and Nursing notes reviewed   NPO Solid Status: > 8 hours  NPO Liquid Status: > 8 hours           Airway   Mallampati: I  TM distance: >3 FB  Neck ROM: full  No difficulty expected  Dental    (+) edentulous    Pulmonary    (+) a smoker (smokes marijuana daily) Former, COPD moderate, asthma (moderate - inhaler daily),rhonchi, decreased breath sounds  Cardiovascular - normal exam  Exercise tolerance: poor (<4 METS)    ECG reviewed  Rate: normal    (+) hypertension well controlled, PVD, DVT resolved, hyperlipidemia    ROS comment: 5/3/24 EKG  HEART RATE= 63  bpm  RR Interval= 960  ms  OR Interval= 128  ms  P Horizontal Axis= -7  deg  P Front Axis= 70  deg  QRSD Interval= 83  ms  QT Interval= 409  ms  QTcB= 417  ms  QRS Axis= 41  deg  T Wave Axis= 35  deg  - BORDERLINE ECG -  Sinus rhythm  Probable left atrial enlargement  No previous ECG available for comparison    Neuro/Psych  (+) headaches, numbness, psychiatric history Anxiety  GI/Hepatic/Renal/Endo    (+) GERD well controlled, PUD, renal disease- stones, thyroid problem hypothyroidism    Musculoskeletal     (+) back pain  Abdominal    Substance History   (+) drug use (THC marijuana dialy)     OB/GYN          Other   arthritis, blood dyscrasia anemia,   history of cancer (cervical) remission    ROS/Med Hx Other: Hx gastric ulcer    EKG 05/03/24: HR 63,   Sinus rhythm  Probable left atrial enlargement  No previous ECG available for comparison    Ct chest 11/19/24:   IMPRESSION:  Impression:  1. No suspicious pulmonary nodules or masses to indicate primary lung carcinoma.  2. Pulmonary scarring as described above.  3. Mild bronchial wall thickening which can be seen with chronic bronchitis.  4. Additional findings as noted above.   Recommendation:  Continue annual screening with LDCT   Lung Rads Assessment:  Lung-RADS L1 - Negative, <1% chance of malignancy.           Phys Exam Other: Duoneb in preop                Anesthesia Plan    ASA 3     general   total IV anesthesia  (Total IV Anesthesia    Patient understands anesthesia not responsible for dental damage.      Discussed risks with pt including aspiration, allergic reactions, apnea, advanced airway placement. Pt verbalized understanding. All questions answered.     )  intravenous induction     Anesthetic plan, risks, benefits, and alternatives have been provided, discussed and informed consent has been obtained with: patient.  Pre-procedure education provided  Plan discussed with CRNA.      CODE STATUS:

## 2025-02-17 NOTE — PRE-PROCEDURE INSTRUCTIONS
"PAT call attempted. No answer.Detailed message with date and arrival time of  1000 given. Come to entrance \"C\", must have adult  for transportation home;may have two visitors;however, children under 12 must remain in waiting area.Instructed on diet/clear liquids/NPO bowel prep,if needed may take normal meds two hours prior to arrival time except for blood thinners, antidiabetics,diurectics,and weight loss meds.Instructed to return call to confirm receipt of instructions and for any questions.  "

## 2025-02-17 NOTE — TELEPHONE ENCOUNTER
Nora RODRIGUEZ Lizandro  1955     Patient requested to reschedule their EGD. I have offered to reschedule this patient and patient has agreed. Patient has been rescheduled to 3.27.25    Reason for rescheduling: Possibly weather    Original date of case request: 11.18.24    This procedure was ordered by DEANA Boateng for an important reason. I have thoroughly discussed with the patient that there are risks associated with not proceeding with the procedure at this time such as a delay in diagnosis, risk of incurable disease, or cancer. Patient verbalized understanding the risks discussed.    Patient plans to call us back to reschedule: Yes pt r/s while on the phone    Updated clearance needed?: No    Is there a follow up appointment that needs to be rescheduled after the procedure date? Yes - Patient's office visit has been rescheduled to 4.14.25.    If yes, clearance request has been submitted to: No    Is the patient currently on any injectable medications for weight loss or diabetes? No    If yes, are they aware that they will need to discontinue this 7 days prior to their procedure? No    Has endo scheduling been notified? Yes    If yes, who did you speak to? shi    Has the case request been updated? Yes

## 2025-02-17 NOTE — TELEPHONE ENCOUNTER
Hub staff attempted to follow warm transfer process and was unsuccessful     Caller: Nora Bone    Relationship to patient: Self    Best call back number: [phone]    Patient is needing: PATIENT HAS A PROCEDURE SCHEDULED ON 02/19/2025 AND WOULD LIKE TO TALK ABOUT RESCHEDULING DUE TO POSSIBLE WEATHER.  PLEASE REACH OUT TO ASSIST.  THANK YOU

## 2025-02-19 ENCOUNTER — ANESTHESIA (OUTPATIENT)
Dept: GASTROENTEROLOGY | Facility: HOSPITAL | Age: 70
End: 2025-02-19
Payer: MEDICARE

## 2025-02-20 DIAGNOSIS — I87.2 VENOUS (PERIPHERAL) INSUFFICIENCY: ICD-10-CM

## 2025-02-20 DIAGNOSIS — I83.893 VARICOSE VEINS OF BILATERAL LOWER EXTREMITIES WITH OTHER COMPLICATIONS: ICD-10-CM

## 2025-02-20 RX ORDER — DIOSMIN COMPLEX NO.1 630 MG
1 TABLET ORAL DAILY
Qty: 30 EACH | Refills: 11 | Status: SHIPPED | OUTPATIENT
Start: 2025-02-20 | End: 2026-02-15

## 2025-02-25 ENCOUNTER — TELEPHONE (OUTPATIENT)
Dept: GASTROENTEROLOGY | Facility: CLINIC | Age: 70
End: 2025-02-25
Payer: MEDICARE

## 2025-02-25 NOTE — TELEPHONE ENCOUNTER
Dr. Escalona advised that he will need to be out of the office on 3.27.2025.     Patient was on the schedule for an ENDO procedure this date. I spoke with patient and have rescheduled this appointment.       Procedure: EGD     New Appointment Date: 3.20.2025 @ 1130     UPDATED CLEARANCE NEEDED:  SENT:

## 2025-03-07 DIAGNOSIS — J30.89 NON-SEASONAL ALLERGIC RHINITIS, UNSPECIFIED TRIGGER: ICD-10-CM

## 2025-03-07 DIAGNOSIS — M85.80 OSTEOPENIA, UNSPECIFIED LOCATION: ICD-10-CM

## 2025-03-07 DIAGNOSIS — M19.90 ARTHRITIS: ICD-10-CM

## 2025-03-08 RX ORDER — LEVOCETIRIZINE DIHYDROCHLORIDE 5 MG/1
5 TABLET, FILM COATED ORAL EVERY EVENING
Qty: 30 TABLET | Refills: 0 | Status: SHIPPED | OUTPATIENT
Start: 2025-03-08

## 2025-03-08 RX ORDER — DULOXETIN HYDROCHLORIDE 30 MG/1
30 CAPSULE, DELAYED RELEASE ORAL DAILY
Qty: 30 CAPSULE | Refills: 0 | Status: SHIPPED | OUTPATIENT
Start: 2025-03-08

## 2025-03-13 NOTE — PRE-PROCEDURE INSTRUCTIONS
Reminded of arrival time at  1200     , Entrance C of the [facility] hospital. Instructed to bring or have a  over the age of 18 set up to drive you home the day of procedure.      Reminded them not to eat or drink anything am of procedure unless its a sip of water with medications. Use/ bring inhalers am of procedure. Patient verbalized understanding.

## 2025-03-20 ENCOUNTER — TELEPHONE (OUTPATIENT)
Dept: GASTROENTEROLOGY | Facility: CLINIC | Age: 70
End: 2025-03-20
Payer: MEDICARE

## 2025-03-20 ENCOUNTER — HOSPITAL ENCOUNTER (OUTPATIENT)
Facility: HOSPITAL | Age: 70
Setting detail: HOSPITAL OUTPATIENT SURGERY
Discharge: HOME | End: 2025-03-20
Attending: INTERNAL MEDICINE | Admitting: INTERNAL MEDICINE
Payer: MEDICARE

## 2025-03-20 VITALS
DIASTOLIC BLOOD PRESSURE: 83 MMHG | WEIGHT: 117.95 LBS | RESPIRATION RATE: 17 BRPM | SYSTOLIC BLOOD PRESSURE: 138 MMHG | OXYGEN SATURATION: 98 % | BODY MASS INDEX: 20.89 KG/M2 | HEART RATE: 75 BPM | TEMPERATURE: 97.8 F

## 2025-03-20 DIAGNOSIS — R12 HEARTBURN: ICD-10-CM

## 2025-03-20 DIAGNOSIS — K25.9 GASTRIC ULCER, UNSPECIFIED CHRONICITY, UNSPECIFIED WHETHER GASTRIC ULCER HEMORRHAGE OR PERFORATION PRESENT: Primary | ICD-10-CM

## 2025-03-20 DIAGNOSIS — K25.9 GASTRIC ULCER, UNSPECIFIED CHRONICITY, UNSPECIFIED WHETHER GASTRIC ULCER HEMORRHAGE OR PERFORATION PRESENT: ICD-10-CM

## 2025-03-20 PROCEDURE — 43239 EGD BIOPSY SINGLE/MULTIPLE: CPT | Performed by: INTERNAL MEDICINE

## 2025-03-20 PROCEDURE — 94799 UNLISTED PULMONARY SVC/PX: CPT

## 2025-03-20 PROCEDURE — 25810000003 LACTATED RINGERS PER 1000 ML

## 2025-03-20 PROCEDURE — 94640 AIRWAY INHALATION TREATMENT: CPT

## 2025-03-20 PROCEDURE — 43249 ESOPH EGD DILATION <30 MM: CPT | Performed by: INTERNAL MEDICINE

## 2025-03-20 PROCEDURE — 25010000002 LIDOCAINE PF 2% 2 % SOLUTION

## 2025-03-20 PROCEDURE — C1726 CATH, BAL DIL, NON-VASCULAR: HCPCS | Performed by: INTERNAL MEDICINE

## 2025-03-20 PROCEDURE — 25010000002 PROPOFOL 10 MG/ML EMULSION

## 2025-03-20 DEVICE — DEV CLIP ENDO RESOLUTION360 CONTRL ROT 235CM: Type: IMPLANTABLE DEVICE | Site: ESOPHAGUS | Status: FUNCTIONAL

## 2025-03-20 RX ORDER — PHENYLEPHRINE HCL IN 0.9% NACL 1 MG/10 ML
SYRINGE (ML) INTRAVENOUS AS NEEDED
Status: DISCONTINUED | OUTPATIENT
Start: 2025-03-20 | End: 2025-03-20 | Stop reason: SURG

## 2025-03-20 RX ORDER — SODIUM CHLORIDE, SODIUM LACTATE, POTASSIUM CHLORIDE, CALCIUM CHLORIDE 600; 310; 30; 20 MG/100ML; MG/100ML; MG/100ML; MG/100ML
30 INJECTION, SOLUTION INTRAVENOUS CONTINUOUS
Status: DISCONTINUED | OUTPATIENT
Start: 2025-03-20 | End: 2025-03-20 | Stop reason: HOSPADM

## 2025-03-20 RX ORDER — IPRATROPIUM BROMIDE AND ALBUTEROL SULFATE 2.5; .5 MG/3ML; MG/3ML
3 SOLUTION RESPIRATORY (INHALATION) ONCE
Status: COMPLETED | OUTPATIENT
Start: 2025-03-20 | End: 2025-03-20

## 2025-03-20 RX ORDER — PANTOPRAZOLE SODIUM 40 MG/1
40 TABLET, DELAYED RELEASE ORAL DAILY
Qty: 90 TABLET | Refills: 1 | Status: SHIPPED | OUTPATIENT
Start: 2025-03-20 | End: 2025-03-21 | Stop reason: SINTOL

## 2025-03-20 RX ORDER — LIDOCAINE HYDROCHLORIDE 20 MG/ML
INJECTION, SOLUTION EPIDURAL; INFILTRATION; INTRACAUDAL; PERINEURAL AS NEEDED
Status: DISCONTINUED | OUTPATIENT
Start: 2025-03-20 | End: 2025-03-20 | Stop reason: SURG

## 2025-03-20 RX ORDER — SUCRALFATE 1 G/1
1 TABLET ORAL 4 TIMES DAILY
Qty: 120 TABLET | Refills: 0 | Status: SHIPPED | OUTPATIENT
Start: 2025-03-20 | End: 2025-04-19

## 2025-03-20 RX ORDER — PROPOFOL 10 MG/ML
VIAL (ML) INTRAVENOUS AS NEEDED
Status: DISCONTINUED | OUTPATIENT
Start: 2025-03-20 | End: 2025-03-20 | Stop reason: SURG

## 2025-03-20 RX ADMIN — Medication 100 MCG: at 12:27

## 2025-03-20 RX ADMIN — SODIUM CHLORIDE, SODIUM LACTATE, POTASSIUM CHLORIDE, CALCIUM CHLORIDE 30 ML/HR: 20; 30; 600; 310 INJECTION, SOLUTION INTRAVENOUS at 11:24

## 2025-03-20 RX ADMIN — LIDOCAINE HYDROCHLORIDE 40 MG: 20 INJECTION, SOLUTION INTRAVENOUS at 11:55

## 2025-03-20 RX ADMIN — Medication 100 MCG: at 12:16

## 2025-03-20 RX ADMIN — PROPOFOL 150 MCG/KG/MIN: 10 INJECTION, EMULSION INTRAVENOUS at 11:56

## 2025-03-20 RX ADMIN — Medication 100 MCG: at 12:18

## 2025-03-20 RX ADMIN — PROPOFOL 70 MG: 10 INJECTION, EMULSION INTRAVENOUS at 11:55

## 2025-03-20 RX ADMIN — Medication 200 MCG: at 12:24

## 2025-03-20 RX ADMIN — IPRATROPIUM BROMIDE AND ALBUTEROL SULFATE 3 ML: .5; 3 SOLUTION RESPIRATORY (INHALATION) at 11:24

## 2025-03-20 NOTE — ANESTHESIA POSTPROCEDURE EVALUATION
Patient: Nora Bone    Procedure Summary       Date: 03/20/25 Room / Location: [facility] END[facility]COPY 5 / [facility]    Anesthesia Start: 1152 Anesthesia Stop: 1231    Procedure: ESOPHAGOGASTRODUODENOSCOPY with balloon dilatation to 18mm, biopsies, clip application x1 Diagnosis:       Gastric ulcer, unspecified chronicity, unspecified whether gastric ulcer hemorrhage or perforation present      (Gastric ulcer, unspecified chronicity, unspecified whether gastric ulcer hemorrhage or perforation present [K25.9])    Surgeons: Lucian Escalona MD Provider: Leticia Shirley CRNA    Anesthesia Type: general ASA Status: 3            Anesthesia Type: general    Vitals  Vitals Value Taken Time   /83 03/20/25 12:52   Temp 36.6 °C (97.8 °F) 03/20/25 12:26   Pulse 69 03/20/25 12:57   Resp 17 03/20/25 12:51   SpO2 100 % 03/20/25 12:57   Vitals shown include unfiled device data.        Post Anesthesia Care and Evaluation    Post-procedure mental status: acceptable.  Pain management: satisfactory to patient    Airway patency: patent  Anesthetic complications: No anesthetic complications    Cardiovascular status: acceptable  Respiratory status: acceptable    Comments: Per chart review

## 2025-03-20 NOTE — TELEPHONE ENCOUNTER
Hub staff attempted to follow warm transfer process and was unsuccessful     Caller: Nora Bone    Relationship to patient: Self    Best call back number: [phone]    Patient is needing: PATIENT CALLED IN AND STATED THAT SHE WAS PRESCRIBED PANTOPRAZOLE MEDICATION AND SHE IS ALLERGIC TO IT; IT CAUSES HER TO HAVE CONVULSIONS. PATIENT WOULD LIKE A CALL BACK TO GET THE MATTER CORRECTED. PATIENT STATED THAT THIS MATTER HAD BEEN BROUGHT TO DR. AMIN'S ATTENTION PREVIOUSLY AND HE PRESCRIBED HER FAMOTIDINE 40MG. PLEASE CALL BACK AS SOON AS POSSIBLE, PATIENT STATES THAT SHE WAS SUPPOSED TO GET STARTED ON THE MEDICATION IMMEDIATELY.         Was the patient seen in the last year in this department? Yes    Does patient have an active prescription for medications requested? No     Received Request Via: Pharmacy

## 2025-03-20 NOTE — TELEPHONE ENCOUNTER
Returned pt call - no answer -lmovm Per verbal order, Dr Escalona would like to know if patient has tried Dexilant

## 2025-03-20 NOTE — H&P
Pre Procedure History & Physical    Chief Complaint: Follow-up EGD to evaluate healing of gastric ulcer    HPI: Patient is a 70 years old female with history of dysphagia, regurgitation and nausea and vomiting.  She underwent EGD on 11/13/2024    -Z-line regular, 36 cm from the incisors.   - Benign-appearing partial non-obstructing esophageal ring.   - Small hiatal hernia.   - Esophageal plaques were found, suspicious for candidiasis. Biopsied.   - [place] colored mucosa at GE junction. Biopsy not obtained   - 5 Non-bleeding gastric ulcers of about 5 mm in size with pigmented material. Biopsied.   - Normal duodenal bulb, first portion of the duodenum, second portion of the duodenum and third portion of the duodenum    Pathology:    No H. pylori infection.    Gastric antrum, body and fundus mucosa with erosions and reactive inflammation  Esophageal biopsy with reflux esophagitis    Patient is here for follow-up EGD to evaluate healing of gastric ulcers and take the biopsy from the salmon-colored mucosa.  She does not take any blood thinner or anticoagulant medication.  She is off of oral prednisone    Past Medical History:   Past Medical History:   Diagnosis Date    Anemia     NO CURRENT ISSUES GETS MONTHLY SHOTS    Anxiety     Asthma     INHALER, CONTROLLED    Back pain     OCCASSIONAL    Cervical cancer     COPD (chronic obstructive pulmonary disease)     no O2 use    Deep vein thrombosis     NO CURRENT ISSUES NO THINNERS    Disease of thyroid gland     Diverticulosis     Fecal incontinence     Hearing loss     Hyperlipidemia     Hypertension     NO MEDS    Muscle weakness     detereration    Peripheral vascular disease     WEARS SUPPORT HOSE/R VARICOSE VEINS    Polymyositis     FOLLOWS BROCK, DAILY PREDNISONE       Past Surgical History:  Past Surgical History:   Procedure Laterality Date    CARPAL TUNNEL RELEASE Bilateral     COLONOSCOPY N/A 11/13/2024    Procedure: COLONOSCOPY WITH POLYPECTOMY/SNARE AND  BIOPSY;  Surgeon: Lucian Escalona MD;  Location: [facility] ENDOSCOPY;  Service: Gastroenterology;  Laterality: N/A;  COLON POLYPS/HEMORRHOIDS/DIVERTICULOSIS    COLONOSCOPY W/ BIOPSIES      DILATATION AND CURETTAGE      ENDOSCOPY N/A 11/13/2024    Procedure: ESOPHAGOGASTRODUODENOSCOPY WITH BIOPSY;  Surgeon: Lucian Escalona MD;  Location: [facility] ENDOSCOPY;  Service: Gastroenterology;  Laterality: N/A;  GASTRIC BODY ULCERS, HIATAL HERNIA, CANDIDIASIS    INTERSTIM PLACEMENT N/A 4/19/2024    Procedure: basic peripheral nerve evaluation;  Surgeon: Roque Dowd MD;  Location: [facility] OR [facility];  Service: General;  Laterality: N/A;    INTERSTIM PLACEMENT N/A 5/3/2024    Procedure: INTERSTIM STAGES 1 AND 2 LEAD AND GENERATOR PLACEMENT, full implant procedure;  Surgeon: Roque Dowd MD;  Location: [facility] OR [facility];  Service: General;  Laterality: N/A;    MUSCLE BIOPSY Right 02/26/2024    Procedure: MUSCLE BIOPSY right thigh;  Surgeon: Roque Dowd MD;  Location: [facility] OR [facility];  Service: General;  Laterality: Right;    VAGINAL HYSTERECTOMY      VEIN SURGERY Right     STRIPPING       Family History:  Family History   Problem Relation Age of Onset    Ovarian cancer Mother     Heart disease Mother     Thyroid disease Mother     Epilepsy Father     Breast cancer Sister     Breast cancer Paternal Aunt     Malig Hyperthermia Neg Hx     Colon cancer Neg Hx        Social History:   reports that she quit smoking about 10 years ago. Her smoking use included cigarettes. She started smoking about 30 years ago. She has a 20 pack-year smoking history. She has never been exposed to tobacco smoke. She has never used smokeless tobacco. She reports current drug use. Drug: Marijuana. She reports that she does not drink alcohol.    Medications:   Medications Prior to Admission   Medication Sig Dispense Refill Last Dose/Taking    albuterol sulfate  (90 Base) MCG/ACT inhaler Inhale 2 puffs Every 6 (Six) Hours As  "Needed for Wheezing. 18 g 0 3/19/2025    alendronate (FOSAMAX) 35 MG tablet Take 1 tablet by mouth Every 7 (Seven) Days.   3/19/2025    amitriptyline (ELAVIL) 25 MG tablet TAKE 1 TABLET BY MOUTH EVERY NIGHT AT BEDTIME 36 tablet 0 3/19/2025    budesonide-formoterol (SYMBICORT) 160-4.5 MCG/ACT inhaler inhale two puffs BY MOUTH TWICE DAILY 10.2 g 0 3/20/2025    Calcium Carb-Cholecalciferol (calcium 500 mg vitamin D 5 mcg, 200 UT,) 500-5 MG-MCG tablet per tablet TAKE 1 TABLET BY MOUTH TWICE DAILY 600 tablet 0 3/19/2025    Cholecalciferol 25 MCG (1000 UT) chewable tablet Chew 1 tablet Daily.   3/19/2025    cyanocobalamin 1000 MCG/ML injection Inject 1 mL into the appropriate muscle as directed by prescriber Every 28 (Twenty-Eight) Days. 3 mL 3 3/19/2025    Dietary Management Product (Vasculera) tablet Take 1 each by mouth Daily for 360 days. 30 each 11 3/19/2025    DULoxetine (CYMBALTA) 30 MG capsule TAKE 1 CAPSULE BY MOUTH EVERY DAY 30 capsule 0 3/19/2025    famotidine (Pepcid) 40 MG tablet Take 1 tablet by mouth Daily. 90 tablet 1 3/19/2025    fluticasone (FLONASE) 50 MCG/ACT nasal spray USE ONE SPRAY IN EACH NOSTRIL EVERY DAY 48 g 5 3/19/2025    hydrOXYzine (ATARAX) 25 MG tablet Take 1 tablet by mouth 3 (Three) Times a Day As Needed for Itching. 21 tablet 0 3/19/2025    levocetirizine (XYZAL) 5 MG tablet TAKE 1 TABLET BY MOUTH EVERY EVENING 30 tablet 0 3/19/2025    levothyroxine (SYNTHROID, LEVOTHROID) 88 MCG tablet TAKE 1 TABLET BY MOUTH EVERY DAY 30 tablet 1 3/20/2025    lidocaine (LIDODERM) 5 % APPLY 3 PATCHES ON THE SKIN AS DIRECTED EVERY DAY (REMOVE AND DISCARD PATCH WITHIN 12 HOUR OR AS DIRECTED BY MD) 90 patch 1 3/19/2025    ondansetron (ZOFRAN) 4 MG tablet Take 1 tablet by mouth 4 (Four) Times a Day As Needed for Nausea or Vomiting. 30 tablet 1 3/19/2025    polyethylene glycol (MIRALAX) 17 g packet Take 17 g by mouth Daily. 3 packet 0 3/19/2025    B-D 3CC LUER-LUCINA SYR 25GX1\" 25G X 1\" 3 ML misc use once monthly " for b12 injection 3 each 0     predniSONE (DELTASONE) 10 MG tablet Take 4 tablets by mouth Daily.   Unknown       Allergies:  Contreet hydrocolloid [silver], Hydrocodone-acetaminophen, Pollen extract, Azathioprine, Pantoprazole sodium, Molds & smuts, and Percocet [oxycodone-acetaminophen]      Objective     Blood pressure 160/95, pulse 68, temperature 97.1 °F (36.2 °C), temperature source Temporal, resp. rate 16, weight 53.5 kg (117 lb 15.1 oz), SpO2 97%, not currently breastfeeding.    Physical Exam   Constitutional: Pt is oriented to person, place, and time and well-developed, well-nourished, and in no distress.   Mouth/Throat: Oropharynx is clear and moist.   Neck: Normal range of motion.   Cardiovascular: Normal rate, regular rhythm and normal heart sounds.    Pulmonary/Chest: Effort normal and breath sounds normal.   Abdominal: Soft. Nontender  Skin: Skin is warm and dry.   Psychiatric: Mood, memory, affect and judgment normal.     Assessment & Plan     Diagnosis:    Gastric erosions and nonbleeding gastric ulcers  Reflux esophagitis    Anticipated Surgical Procedure:    EGD    The risks, benefits, and alternatives of this procedure have been discussed with the patient or the responsible party- the patient understands and agrees to proceed.        Electronically signed by Lucian Escalona MD, 03/20/25, 11:29 AM EDT.

## 2025-03-21 LAB
CYTO UR: NORMAL
LAB AP CASE REPORT: NORMAL
LAB AP CLINICAL INFORMATION: NORMAL
PATH REPORT.FINAL DX SPEC: NORMAL
PATH REPORT.GROSS SPEC: NORMAL

## 2025-03-21 RX ORDER — DEXLANSOPRAZOLE 60 MG/1
60 CAPSULE, DELAYED RELEASE ORAL DAILY
Qty: 30 CAPSULE | Refills: 5 | Status: SHIPPED | OUTPATIENT
Start: 2025-03-21 | End: 2025-09-17

## 2025-03-21 NOTE — TELEPHONE ENCOUNTER
Called pt to ask about Dexilant, Pt has not tried this medication and would like to test out as the Famotidine is not working. I advised pt I would speak w/ Dr Escalona and have him prescribe

## 2025-04-03 ENCOUNTER — RESULTS FOLLOW-UP (OUTPATIENT)
Dept: GASTROENTEROLOGY | Facility: HOSPITAL | Age: 70
End: 2025-04-03
Payer: MEDICARE

## 2025-04-06 ENCOUNTER — PREP FOR SURGERY (OUTPATIENT)
Dept: OTHER | Facility: HOSPITAL | Age: 70
End: 2025-04-06
Payer: MEDICARE

## 2025-04-06 DIAGNOSIS — K22.70 BARRETT'S ESOPHAGUS DETERMINED BY BIOPSY: Primary | ICD-10-CM

## 2025-04-08 RX ORDER — METHOTREXATE 2.5 MG/1
7.5 TABLET ORAL WEEKLY
COMMUNITY

## 2025-04-08 NOTE — TELEPHONE ENCOUNTER
Spoke to patient and informed of DEANA Boateng result note and recommendations. Verified patient understanding.    Educated on intestinal metaplasia:  metaplasia is the transition of cells into a different type of cell, in this case we see intestinal cells in the esophagus/stomach.  This is a normal cell; however, it is not in its normal place.      In most cases, it is thought to be caused by long-term/chronic exposure to acid reflux. In many cases we control this by medications and/or lifestyle changes:  avoid trigger foods, do not lay down after eating for at least 3 hours, elevate HOB by 6-8 inches, lose extra abdominal weight.    Intestinal metaplasia can be associated with an increased risk of cancer.  If the cell becomes abnormal, it is a very slow process of cellular change.  Repeat EGD will be recommended to evaluate.  General recommendations to decrease risk of cancer would be smoking cessation, abstain from alcohol, and avoid NSAIDs.    Patient is staking dexlansoprazole (Dexilant) 60 mg daily.  Educated on why it was prescribed and how best to take this medication.  Patient verbalized understanding.     Confirmed patient is taking sucralfate as prescribed.    Patient is agreeable to repeat scope (does not want to be scoped on a Monday) -- May or may not need pulmonary clearance.  Patient has first appointment with Dr. Abel on 04.21.25.     Confirmed follow up appointment with DEANA Boateng on 04.14.25 at 1515.    [employer] education sent.    Advised patient to reach out to our office with any GI needs.

## 2025-04-14 ENCOUNTER — TELEMEDICINE (OUTPATIENT)
Dept: GASTROENTEROLOGY | Facility: CLINIC | Age: 70
End: 2025-04-14
Payer: MEDICARE

## 2025-04-14 ENCOUNTER — TELEPHONE (OUTPATIENT)
Dept: GASTROENTEROLOGY | Facility: CLINIC | Age: 70
End: 2025-04-14

## 2025-04-14 ENCOUNTER — PREP FOR SURGERY (OUTPATIENT)
Dept: OTHER | Facility: HOSPITAL | Age: 70
End: 2025-04-14
Payer: MEDICARE

## 2025-04-14 VITALS — WEIGHT: 118 LBS | RESPIRATION RATE: 18 BRPM | HEIGHT: 63 IN | BODY MASS INDEX: 20.91 KG/M2

## 2025-04-14 DIAGNOSIS — K22.70 BARRETT'S ESOPHAGUS DETERMINED BY BIOPSY: Primary | ICD-10-CM

## 2025-04-14 DIAGNOSIS — K25.9 GASTRIC ULCER, UNSPECIFIED CHRONICITY, UNSPECIFIED WHETHER GASTRIC ULCER HEMORRHAGE OR PERFORATION PRESENT: ICD-10-CM

## 2025-04-14 DIAGNOSIS — R12 HEARTBURN: ICD-10-CM

## 2025-04-14 PROCEDURE — 99213 OFFICE O/P EST LOW 20 MIN: CPT

## 2025-04-14 PROCEDURE — 1160F RVW MEDS BY RX/DR IN RCRD: CPT

## 2025-04-14 PROCEDURE — 1159F MED LIST DOCD IN RCRD: CPT

## 2025-04-14 RX ORDER — SUCRALFATE 1 G/1
1 TABLET ORAL 4 TIMES DAILY
Qty: 120 TABLET | Refills: 5 | Status: SHIPPED | OUTPATIENT
Start: 2025-04-14

## 2025-04-14 RX ORDER — FAMOTIDINE 40 MG/1
1 TABLET, FILM COATED ORAL 2 TIMES DAILY
COMMUNITY
End: 2025-04-14

## 2025-04-14 RX ORDER — CLOBETASOL PROPIONATE 0.05 G/100ML
SHAMPOO TOPICAL
COMMUNITY
Start: 2025-03-12

## 2025-04-14 RX ORDER — CLOBETASOL PROPIONATE 0.5 MG/G
1 CREAM TOPICAL
COMMUNITY
Start: 2025-03-12

## 2025-04-14 RX ORDER — DEXLANSOPRAZOLE 60 MG/1
60 CAPSULE, DELAYED RELEASE ORAL DAILY
Qty: 30 CAPSULE | Refills: 5 | Status: SHIPPED | OUTPATIENT
Start: 2025-04-14 | End: 2025-10-11

## 2025-04-14 RX ORDER — FOLIC ACID 1 MG/1
1000 TABLET ORAL DAILY
COMMUNITY
Start: 2025-03-28

## 2025-04-14 NOTE — PROGRESS NOTES
Chief Complaint     Follow-up (Pt needs repeat scope scheduled -- does patient need pulmonary clearance (see result note follow up).//EGD f/u)    History of Present Illness     Nora Bone is a 70 y.o. female who presents to [facility] for follow-up after EGD.       4/14/2025 Telehealth visit: Patient presents today for telehealth service. This service was conducted via [employer] video. Patient is states she is not feeling well after an infusion. This provider is in [place], [place] at the [facility]. Patient is being seen remotely via telehealth at their home address in [address] [place] [address], and stated they are in a secure environment for this session. The patient's condition being diagnosed is appropriate for telemedicine. Patient consented. You have chosen to receive care through a telephone visit. Do you consent to use a telephone visit for your medical care today? Yes.    Patient had a recent EGD performed by Dr. Escalona on March 20, 2025 that indicated salmon-colored mucosa that was suspicious for short segment Morton's esophagus.  A medium size hiatal hernia was also noted.  Per Dr. Escalona's recommendations he recommended a repeat EGD in 6 months for confirmation of Morton's esophagus.  Patient reports continued use of the medication Dexilant 60 mg capsule daily and the Carafate 1 gm up to four times daily prior to meals. She explains her heartburn symptoms are well controlled.  Patient denies any nausea, vomiting, unintentional weight loss, or trouble swallowing.  No reports of lower GI complaints-no constipation, diarrhea, bloody or black stools.  No reports of abdominal pain.    3/20/2025 EGD performed by Dr. Escalona - [place] colored mucosa suspicious for Morton's esophagus and suspicious for short segment Morton's esophagus. Medium sized hiatal hernia. Repeat scope in 6 months. Esophagus biopsy findings of squamocolumnar  junction mucosa with intestinal metaplasia, negative results for dysplasia.        History      Past Medical History:   Diagnosis Date    Anemia     NO CURRENT ISSUES GETS MONTHLY SHOTS    Anxiety     Asthma     INHALER, CONTROLLED    Back pain     OCCASSIONAL    Cervical cancer     COPD (chronic obstructive pulmonary disease)     no O2 use    Deep vein thrombosis     NO CURRENT ISSUES NO THINNERS    Disease of thyroid gland     Diverticulosis     Fecal incontinence     Hearing loss     Hyperlipidemia     Hypertension     NO MEDS    Muscle weakness     detereration    Peripheral vascular disease     WEARS SUPPORT HOSE/R VARICOSE VEINS    Polymyositis     FOLLOWS BROCK, DAILY PREDNISONE     Past Surgical History:   Procedure Laterality Date    CARPAL TUNNEL RELEASE Bilateral     COLONOSCOPY N/A 11/13/2024    Procedure: COLONOSCOPY WITH POLYPECTOMY/SNARE AND BIOPSY;  Surgeon: Lucian Escalona MD;  Location: [facility] ENDOSCOPY;  Service: Gastroenterology;  Laterality: N/A;  COLON POLYPS/HEMORRHOIDS/DIVERTICULOSIS    COLONOSCOPY W/ BIOPSIES      DILATATION AND CURETTAGE      ENDOSCOPY N/A 11/13/2024    Procedure: ESOPHAGOGASTRODUODENOSCOPY WITH BIOPSY;  Surgeon: Lucian Escalona MD;  Location: [facility] ENDOSCOPY;  Service: Gastroenterology;  Laterality: N/A;  GASTRIC BODY ULCERS, HIATAL HERNIA, CANDIDIASIS    ENDOSCOPY N/A 3/20/2025    Procedure: ESOPHAGOGASTRODUODENOSCOPY with balloon dilatation to 18mm, biopsies, clip application x1;  Surgeon: Lucian Escalona MD;  Location: [facility] ENDOSCOPY;  Service: Gastroenterology;  Laterality: N/A;  esophageal stricture    INTERSTIM PLACEMENT N/A 4/19/2024    Procedure: basic peripheral nerve evaluation;  Surgeon: Roque Dowd MD;  [place] [facility] [place] [facility];  Service: General;  Laterality: N/A;    INTERSTIM PLACEMENT N/A 5/3/2024    Procedure: INTERSTIM STAGES 1 AND 2 LEAD AND GENERATOR PLACEMENT, full implant procedure;  Surgeon: Roque Dowd MD;   "Location: [facility] [place] [facility];  Service: General;  Laterality: N/A;    MUSCLE BIOPSY Right 02/26/2024    Procedure: MUSCLE BIOPSY right thigh;  Surgeon: Roque Dowd MD;  Location: [facility] [place] [facility];  Service: General;  Laterality: Right;    VAGINAL HYSTERECTOMY      VEIN SURGERY Right     STRIPPING     Family History   Problem Relation Age of Onset    Ovarian cancer Mother     Heart disease Mother     Thyroid disease Mother     Epilepsy Father     Breast cancer Sister     Breast cancer Paternal Aunt     Malig Hyperthermia Neg Hx     Colon cancer Neg Hx         Current Medications       Current Outpatient Medications:     albuterol sulfate  (90 Base) MCG/ACT inhaler, Inhale 2 puffs Every 6 (Six) Hours As Needed for Wheezing., Disp: 18 g, Rfl: 0    alendronate (FOSAMAX) 35 MG tablet, Take 1 tablet by mouth Every 7 (Seven) Days., Disp: , Rfl:     amitriptyline (ELAVIL) 25 MG tablet, TAKE 1 TABLET BY MOUTH EVERY NIGHT AT BEDTIME, Disp: 36 tablet, Rfl: 0    B-D 3CC LUER-LUCINA SYR 25GX1\" 25G X 1\" 3 ML misc, use once monthly for b12 injection, Disp: 3 each, Rfl: 0    budesonide-formoterol (SYMBICORT) 160-4.5 MCG/ACT inhaler, inhale two puffs BY MOUTH TWICE DAILY, Disp: 10.2 g, Rfl: 0    Calcium Carb-Cholecalciferol (calcium 500 mg vitamin D 5 mcg, 200 UT,) 500-5 MG-MCG tablet per tablet, TAKE 1 TABLET BY MOUTH TWICE DAILY, Disp: 600 tablet, Rfl: 0    Cholecalciferol 25 MCG (1000 UT) chewable tablet, Chew 1 tablet Daily., Disp: , Rfl:     clobetasol propionate (CLOBEX) 0.05 % shampoo, Apply  topically to the appropriate area as directed., Disp: , Rfl:     clobetasol propionate (TEMOVATE) 0.05 % cream, Apply 1 Application topically to the appropriate area as directed., Disp: , Rfl:     cyanocobalamin 1000 MCG/ML injection, Inject 1 mL into the appropriate muscle as directed by prescriber Every 28 (Twenty-Eight) Days., Disp: 3 mL, Rfl: 3    dexlansoprazole (Dexilant) 60 MG capsule, Take 1 capsule by mouth Daily for 180 " days., Disp: 30 capsule, Rfl: 5    Dietary Management Product (Vasculera) tablet, Take 1 each by mouth Daily for 360 days., Disp: 30 each, Rfl: 11    DULoxetine (CYMBALTA) 30 MG capsule, TAKE 1 CAPSULE BY MOUTH EVERY DAY, Disp: 30 capsule, Rfl: 0    fluticasone (FLONASE) 50 MCG/ACT nasal spray, USE ONE SPRAY IN EACH NOSTRIL EVERY DAY, Disp: 48 g, Rfl: 5    folic acid (FOLVITE) 1 MG tablet, Take 1 tablet by mouth Daily., Disp: , Rfl:     hydrOXYzine (ATARAX) 25 MG tablet, Take 1 tablet by mouth 3 (Three) Times a Day As Needed for Itching., Disp: 21 tablet, Rfl: 0    levocetirizine (XYZAL) 5 MG tablet, TAKE 1 TABLET BY MOUTH EVERY EVENING, Disp: 30 tablet, Rfl: 0    levothyroxine (SYNTHROID, LEVOTHROID) 88 MCG tablet, TAKE 1 TABLET BY MOUTH EVERY DAY, Disp: 30 tablet, Rfl: 1    lidocaine (LIDODERM) 5 %, APPLY 3 PATCHES ON THE SKIN AS DIRECTED EVERY DAY (REMOVE AND DISCARD PATCH WITHIN 12 HOUR OR AS DIRECTED BY MD), Disp: 90 patch, Rfl: 1    methotrexate 2.5 MG tablet, Take 3 tablets by mouth 1 (One) Time Per Week. Every Sunday, Disp: , Rfl:     ondansetron (ZOFRAN) 4 MG tablet, Take 1 tablet by mouth 4 (Four) Times a Day As Needed for Nausea or Vomiting., Disp: 30 tablet, Rfl: 1    polyethylene glycol (MIRALAX) 17 g packet, Take 17 g by mouth Daily., Disp: 3 packet, Rfl: 0    sucralfate (Carafate) 1 g tablet, Take 1 tablet by mouth 4 (Four) Times a Day. Sullary form, Disp: 120 tablet, Rfl: 5     Allergies     Allergies   Allergen Reactions    Contreet Hydrocolloid [Silver] Other (See Comments)     shaking    Hydrocodone-Acetaminophen Shortness Of Breath and Nausea And Vomiting     Also reports shaking       Pollen Extract Shortness Of Breath    Azathioprine Nausea And Vomiting    Pantoprazole Sodium GI Intolerance    Molds & Smuts Rash    Percocet [Oxycodone-Acetaminophen] Other (See Comments)     PT REQUEST PERCOCET BE ADDED TO ALLERGY LIST/PT CANNOT SLEEP WHILE TAKING THIS MEDICINE        Social History       Social  "History     Social History Narrative    Not on file         Objective       Resp 18   Ht 160 cm (63\")   Wt 53.5 kg (118 lb)   BMI 20.90 kg/m²       Physical Exam  Neurological:      General: No focal deficit present.      Mental Status: She is alert.   Psychiatric:         Mood and Affect: Mood normal.         Results       Result Review :    The following data was reviewed by: DEANA Boateng on 04/14/2025:    Lab Results - Last 18 Months   Lab Units 12/30/24  0925 08/30/24  1257 07/11/24  0919   WBC 10*3/mm3 7.46 10.65 11.25*   HEMOGLOBIN g/dL 16.3* 15.7 15.3   MCV fL 83.7 90.4 88.9   PLATELETS 10*3/mm3 225 170 224         Lab Results - Last 18 Months   Lab Units 01/27/25  1126 12/30/24  0925 09/27/24  1004   BUN mg/dL 12 11 5*   CREATININE mg/dL 1.19* 1.06* 0.89   SODIUM mmol/L 138 140 139   POTASSIUM mmol/L 4.6 4.6 4.3   CHLORIDE mmol/L 102 103 102   CO2 mmol/L 25.6 26.1 25.6   GLUCOSE mg/dL 98 108* 99      No results for input(s): \"PROTIME\", \"INR\", \"PTT\" in the last 92132 hours.  Lab Results - Last 18 Months   Lab Units 12/30/24  0925 09/27/24  1004 08/30/24  1257   AST (SGOT) U/L 24 14 28   ALT (SGPT) U/L 16 10 19   ALK PHOS U/L 112 84 106   BILIRUBIN mg/dL 0.2 0.2 0.3   TOTAL PROTEIN g/dL 7.0 6.4 7.2   ALBUMIN g/dL 4.3 3.4* 4.4      No results for input(s): \"IRON\", \"TRANSFERRIN\", \"TIBC\", \"FERRITIN\", \"HYBZKSCL07\", \"FOLATE\" in the last 08655 hours.  No results for input(s): \"HAV\", \"HEPAIGM\", \"HEPBIGM\", \"HEPBCAB\", \"HBEAG\", \"HEPCAB\" in the last 38756 hours.    No Images in the past 120 days found..         Assessment and Plan              Diagnoses and all orders for this visit:    1. Morton's esophagus determined by biopsy (Primary)    2. Gastric ulcer, unspecified chronicity, unspecified whether gastric ulcer hemorrhage or perforation present  -     dexlansoprazole (Dexilant) 60 MG capsule; Take 1 capsule by mouth Daily for 180 days.  Dispense: 30 capsule; Refill: 5  -     sucralfate " (Carafate) 1 g tablet; Take 1 tablet by mouth 4 (Four) Times a Day. Sullary form  Dispense: 120 tablet; Refill: 5    3. Heartburn  -     dexlansoprazole (Dexilant) 60 MG capsule; Take 1 capsule by mouth Daily for 180 days.  Dispense: 30 capsule; Refill: 5          * Surgery not found *    Follow Up     Follow Up   No follow-ups on file.    I have recommended that the patient undergo further evaluation with an EGD for confirmation of Morton's esophagus.  I have discussed this procedure in detail with the patient.  I have discussed the risks, benefits, and alternatives.  I have discussed the risk of anesthesia, bleeding and perforation.  Patient understands these risks, benefits, and alternatives and wishes to proceed.  I will schedule her at her earliest convenience.    Patient was given instructions and counseling regarding her condition or for health maintenance advice. Please see specific information pulled into the AVS if appropriate.

## 2025-04-14 NOTE — TELEPHONE ENCOUNTER
Hub staff attempted to follow warm transfer process and was unsuccessful     Caller: Nora Bone    Relationship to patient: Self    Best call back number: [phone] (home)       Patient is needing: TO CHANGE VISIT TO [employer], PLEASE CALL PT TO ADVISE.

## 2025-04-14 NOTE — TELEPHONE ENCOUNTER
Returned pt call, put pt back on the schedule today with SHW at 315 for a video visit. Pt is agreeable to time of appt.

## 2025-04-14 NOTE — TELEPHONE ENCOUNTER
Attempted to contact pt to schedule repeat EGD in Sept per SHW. Left pt detailed vm (okay per vr) requesting a return call.

## 2025-05-07 DIAGNOSIS — M19.90 ARTHRITIS: ICD-10-CM

## 2025-06-03 ENCOUNTER — TELEPHONE (OUTPATIENT)
Dept: GASTROENTEROLOGY | Facility: CLINIC | Age: 70
End: 2025-06-03
Payer: MEDICARE

## 2025-06-03 RX ORDER — BUDESONIDE AND FORMOTEROL FUMARATE DIHYDRATE 160; 4.5 UG/1; UG/1
2 AEROSOL RESPIRATORY (INHALATION) 2 TIMES DAILY
Qty: 10.2 G | Refills: 3 | Status: SHIPPED | OUTPATIENT
Start: 2025-06-03

## 2025-07-29 DIAGNOSIS — E53.8 VITAMIN B12 DEFICIENCY: ICD-10-CM

## 2025-07-31 RX ORDER — CYANOCOBALAMIN 1000 UG/ML
1000 INJECTION, SOLUTION INTRAMUSCULAR; SUBCUTANEOUS
Qty: 3 ML | Refills: 3 | OUTPATIENT
Start: 2025-07-31

## (undated) DEVICE — SUT VIC PLS CTD BR 3/0 SH 27IN VIL

## (undated) DEVICE — ELECTRD BLD EDGE/STD 1P 2.4X6.35MM STRL

## (undated) DEVICE — Device

## (undated) DEVICE — ESOPHAGEAL BALLOON DILATATION CATHETER: Brand: CRE FIXED WIRE

## (undated) DEVICE — SYR LUERLOK 30CC

## (undated) DEVICE — SOL IRR H2O BTL 1000ML STRL

## (undated) DEVICE — SUT MNCRYL 4/0 PS2 18 IN

## (undated) DEVICE — BLD SCLPL RIBBACK C/SS SZ15

## (undated) DEVICE — MEDICINE CUP, GRADUATED, STER: Brand: MEDLINE

## (undated) DEVICE — SINGLE-USE BIOPSY FORCEPS: Brand: RADIAL JAW 4

## (undated) DEVICE — SNAR E/S POLYP SNAREMASTER OVL/10MM 2.8X2300MM YEL

## (undated) DEVICE — LINER SURG CANSTR SXN S/RIGD 1500CC

## (undated) DEVICE — PAD GRND REM POLYHESIVE A/ DISP

## (undated) DEVICE — THE SINGLE USE ETRAP – POLYP TRAP IS USED FOR SUCTION RETRIEVAL OF ENDOSCOPICALLY REMOVED POLYPS.: Brand: ETRAP

## (undated) DEVICE — MINOR-LF: Brand: MEDLINE INDUSTRIES, INC.

## (undated) DEVICE — GLV SURG BIOGEL LTX PF 7 1/2

## (undated) DEVICE — INTENDED FOR TISSUE SEPARATION, AND OTHER PROCEDURES THAT REQUIRE A SHARP SURGICAL BLADE TO PUNCTURE OR CUT.: Brand: BARD-PARKER ® CARBON RIB-BACK BLADES

## (undated) DEVICE — SLV SCD KN/LEN ADJ EXPRSS BLENDED MD 1P/U

## (undated) DEVICE — STERILE POLYISOPRENE POWDER-FREE SURGICAL GLOVES: Brand: PROTEXIS

## (undated) DEVICE — TBG PENCL TELESCP MEGADYNE SMOKE EVAC 10FT

## (undated) DEVICE — 3M™ IOBAN™ 2 ANTIMICROBIAL INCISE DRAPE 6650EZ: Brand: IOBAN™ 2

## (undated) DEVICE — GLV SURG PROTEXIS SYNTH PI LF PF 7.5

## (undated) DEVICE — DEFENDO AIR WATER SUCTION AND BIOPSY VALVE KIT FOR  OLYMPUS: Brand: DEFENDO AIR/WATER/SUCTION AND BIOPSY VALVE

## (undated) DEVICE — KT STIM ACCSR SCS

## (undated) DEVICE — REMOTE CONTROL: Brand: AXONICS

## (undated) DEVICE — COVER,MAYO STAND,STERILE: Brand: MEDLINE

## (undated) DEVICE — APPL CHLORAPREP HI/LITE 26ML ORNG

## (undated) DEVICE — STERILE POLYISOPRENE POWDER-FREE SURGICAL GLOVES WITH EMOLLIENT COATING: Brand: PROTEXIS

## (undated) DEVICE — CONN JET HYDRA H20 AUXILIARY DISP

## (undated) DEVICE — SOLIDIFIER LIQLOC PLS 1500CC BT

## (undated) DEVICE — COVER,C-ARM,41X74: Brand: MEDLINE

## (undated) DEVICE — ELECTRD BLD EDGE COAT 3IN

## (undated) DEVICE — LEAD IMPLANT KIT: Brand: AXONICS

## (undated) DEVICE — GLV SURG SYNTH PROTEXIS PI LF PF 7

## (undated) DEVICE — GLV SURG PROTEXIS PI BLU NEUTHERA PF 7.5

## (undated) DEVICE — ADHS SKIN PREMIERPRO EXOFIN TOPICAL HI/VISC .5ML

## (undated) DEVICE — SOL IRRG H2O PL/BG 1000ML STRL

## (undated) DEVICE — DISPOSABLE DISTAL ATTACHMENT: Brand: DISPOSABLE DISTAL ATTACHMENT

## (undated) DEVICE — DRAPE,LAPAROTOMY,PCH,STERILE: Brand: MEDLINE

## (undated) DEVICE — THE STERILE LIGHT HANDLE COVER IS USED WITH STERIS SURGICAL LIGHTING AND VISUALIZATION SYSTEMS.

## (undated) DEVICE — PENCL E/S SMOKEEVAC W/TELESCP CANN

## (undated) DEVICE — SUT VIC PLS SH/V20 BR 2/0 27IN VCP317H

## (undated) DEVICE — SUT VIC 3/0 SH 27IN J416H

## (undated) DEVICE — PK MAJ CUST HAR

## (undated) DEVICE — DEV INFL CRE STERIFLATE 60CC DISP

## (undated) DEVICE — ADHS SKIN SURG TISS VISC PREMIERPRO EXOFIN HI/VISC FAST/DRY

## (undated) DEVICE — CHARGING SYSTEM: Brand: AXONICS

## (undated) DEVICE — FIAPC® PROBE W/ FILTER 2200 A OD 2.3MM/6.9FR; L 2.2M/7.2FT: Brand: ERBE

## (undated) DEVICE — BLCK/BITE BLOX WO/DENTL/RIM W/STRAP 54F

## (undated) DEVICE — TRIAL STIMULATOR: Brand: AXONICS

## (undated) DEVICE — Device: Brand: DEFENDO AIR/WATER/SUCTION AND BIOPSY VALVE